# Patient Record
Sex: FEMALE | Race: WHITE | NOT HISPANIC OR LATINO | Employment: OTHER | URBAN - METROPOLITAN AREA
[De-identification: names, ages, dates, MRNs, and addresses within clinical notes are randomized per-mention and may not be internally consistent; named-entity substitution may affect disease eponyms.]

---

## 2017-11-10 ENCOUNTER — TRANSCRIBE ORDERS (OUTPATIENT)
Dept: ADMINISTRATIVE | Facility: HOSPITAL | Age: 72
End: 2017-11-10

## 2017-11-10 DIAGNOSIS — Z12.31 VISIT FOR SCREENING MAMMOGRAM: Primary | ICD-10-CM

## 2017-12-26 ENCOUNTER — HOSPITAL ENCOUNTER (OUTPATIENT)
Dept: MAMMOGRAPHY | Facility: HOSPITAL | Age: 72
Discharge: HOME | End: 2017-12-26
Admitting: FAMILY MEDICINE

## 2017-12-26 DIAGNOSIS — Z12.31 VISIT FOR SCREENING MAMMOGRAM: ICD-10-CM

## 2017-12-26 PROCEDURE — G0202 SCR MAMMO BI INCL CAD: HCPCS

## 2017-12-26 PROCEDURE — 77063 BREAST TOMOSYNTHESIS BI: CPT | Performed by: RADIOLOGY

## 2017-12-26 PROCEDURE — G0202 SCR MAMMO BI INCL CAD: HCPCS | Performed by: RADIOLOGY

## 2017-12-26 PROCEDURE — 77063 BREAST TOMOSYNTHESIS BI: CPT

## 2018-02-02 ENCOUNTER — TRANSCRIBE ORDERS (OUTPATIENT)
Dept: ADMINISTRATIVE | Facility: HOSPITAL | Age: 73
End: 2018-02-02

## 2018-02-02 DIAGNOSIS — Z78.0 POSTMENOPAUSAL: Primary | ICD-10-CM

## 2018-02-13 ENCOUNTER — HOSPITAL ENCOUNTER (OUTPATIENT)
Dept: BONE DENSITY | Facility: HOSPITAL | Age: 73
Discharge: HOME | End: 2018-02-13
Admitting: FAMILY MEDICINE

## 2018-02-13 DIAGNOSIS — Z78.0 POSTMENOPAUSAL: ICD-10-CM

## 2018-02-13 PROCEDURE — 77080 DXA BONE DENSITY AXIAL: CPT

## 2018-06-11 ENCOUNTER — OFFICE VISIT (OUTPATIENT)
Dept: ORTHOPEDIC SURGERY | Facility: CLINIC | Age: 73
End: 2018-06-11

## 2018-06-11 VITALS — WEIGHT: 162.04 LBS | BODY MASS INDEX: 30.59 KG/M2 | OXYGEN SATURATION: 98 % | HEART RATE: 82 BPM | HEIGHT: 61 IN

## 2018-06-11 DIAGNOSIS — M17.11 PRIMARY OSTEOARTHRITIS OF RIGHT KNEE: Primary | ICD-10-CM

## 2018-06-11 DIAGNOSIS — M17.12 PRIMARY OSTEOARTHRITIS OF LEFT KNEE: ICD-10-CM

## 2018-06-11 PROCEDURE — 20610 DRAIN/INJ JOINT/BURSA W/O US: CPT | Performed by: ORTHOPAEDIC SURGERY

## 2018-06-11 PROCEDURE — 99203 OFFICE O/P NEW LOW 30 MIN: CPT | Performed by: ORTHOPAEDIC SURGERY

## 2018-06-11 RX ORDER — TRIAMCINOLONE ACETONIDE 40 MG/ML
40 INJECTION, SUSPENSION INTRA-ARTICULAR; INTRAMUSCULAR
Status: COMPLETED | OUTPATIENT
Start: 2018-06-11 | End: 2018-06-11

## 2018-06-11 RX ORDER — QUINAPRIL 40 MG/1
40 TABLET ORAL DAILY
COMMUNITY
Start: 2018-05-01

## 2018-06-11 RX ORDER — OMEPRAZOLE 20 MG/1
20 CAPSULE, DELAYED RELEASE ORAL DAILY PRN
COMMUNITY
Start: 2018-06-08

## 2018-06-11 RX ORDER — ROPIVACAINE HYDROCHLORIDE 5 MG/ML
4 INJECTION, SOLUTION EPIDURAL; INFILTRATION; PERINEURAL
Status: COMPLETED | OUTPATIENT
Start: 2018-06-11 | End: 2018-06-11

## 2018-06-11 RX ORDER — HYDROCHLOROTHIAZIDE 25 MG/1
25 TABLET ORAL DAILY
COMMUNITY
Start: 2018-06-08

## 2018-06-11 RX ORDER — ASPIRIN 81 MG/1
81 TABLET, CHEWABLE ORAL DAILY
Status: ON HOLD | COMMUNITY
End: 2019-09-11 | Stop reason: SDUPTHER

## 2018-06-11 RX ADMIN — ROPIVACAINE HYDROCHLORIDE 4 ML: 5 INJECTION, SOLUTION EPIDURAL; INFILTRATION; PERINEURAL at 10:03

## 2018-06-11 RX ADMIN — TRIAMCINOLONE ACETONIDE 40 MG: 40 INJECTION, SUSPENSION INTRA-ARTICULAR; INTRAMUSCULAR at 10:03

## 2018-06-11 RX ADMIN — ROPIVACAINE HYDROCHLORIDE 4 ML: 5 INJECTION, SOLUTION EPIDURAL; INFILTRATION; PERINEURAL at 10:04

## 2018-06-11 RX ADMIN — TRIAMCINOLONE ACETONIDE 40 MG: 40 INJECTION, SUSPENSION INTRA-ARTICULAR; INTRAMUSCULAR at 10:04

## 2018-06-11 NOTE — PROGRESS NOTES
[facility] [place] [facility] Note    Subjective     Chief Complaint   Patient presents with   • Left Knee - Pain   • Right Knee - Pain        HPI    Lilia Murcia is a 72 y.o. female. She presents today for evaluation of bilateral knee pain, left greater than right.  The pain is been present for 2-3 years, following no injury.  The pain is worsening over time, moderate to severe, aching and stabbing in quality, associated with swelling, and worsens with walking, standing, sitting and climbing stairs.  She has tried Pennsaid cream, as well as a steroid injection a few years ago.      There is no problem list on file for this patient.    History reviewed. No pertinent past medical history.   Past Surgical History:   Procedure Laterality Date   • BREAST BIOPSY     • CHOLECYSTECTOMY     • HYSTERECTOMY     • OOPHORECTOMY        Family History   Problem Relation Age of Onset   • Breast cancer Neg Hx    • Ovarian cancer Neg Hx      Social History     Social History   • Marital status:      Spouse name: N/A   • Number of children: N/A   • Years of education: N/A     Occupational History   • Not on file.     Social History Main Topics   • Smoking status: Never Smoker   • Smokeless tobacco: Never Used   • Alcohol use No   • Drug use: No   • Sexual activity: Defer     Other Topics Concern   • Not on file     Social History Narrative   • No narrative on file      No current outpatient prescriptions on file prior to visit.     No current facility-administered medications on file prior to visit.       Allergies   Allergen Reactions   • Sulfa Antibiotics Unknown (See Comments)     May or may not be a reaction. Has been years since a reaction.         Review of Systems   Constitutional: Negative for activity change, appetite change, chills, diaphoresis, fatigue, fever and unexpected weight change.   HENT: Negative for congestion, dental problem, drooling, ear discharge, ear pain, facial swelling, hearing loss,  "mouth sores, nosebleeds, postnasal drip, rhinorrhea, sinus pressure, sneezing, sore throat, tinnitus, trouble swallowing and voice change.    Eyes: Negative for photophobia, pain, discharge, redness, itching and visual disturbance.   Respiratory: Negative for apnea, cough, choking, chest tightness, shortness of breath, wheezing and stridor.    Cardiovascular: Negative for chest pain, palpitations and leg swelling.   Gastrointestinal: Negative for abdominal distention, abdominal pain, anal bleeding, blood in stool, constipation, diarrhea, nausea, rectal pain and vomiting.   Endocrine: Negative for cold intolerance, heat intolerance, polydipsia, polyphagia and polyuria.   Genitourinary: Negative for decreased urine volume, difficulty urinating, dysuria, enuresis, flank pain, frequency, genital sores, hematuria and urgency.   Musculoskeletal: Positive for arthralgias. Negative for back pain, gait problem, joint swelling, myalgias, neck pain and neck stiffness.   Skin: Negative for color change, pallor, rash and wound.   Allergic/Immunologic: Negative for environmental allergies, food allergies and immunocompromised state.   Neurological: Negative for dizziness, tremors, seizures, syncope, facial asymmetry, speech difficulty, weakness, light-headedness, numbness and headaches.   Hematological: Negative for adenopathy. Does not bruise/bleed easily.   Psychiatric/Behavioral: Negative for agitation, behavioral problems, confusion, decreased concentration, dysphoric mood, hallucinations, self-injury, sleep disturbance and suicidal ideas. The patient is not nervous/anxious and is not hyperactive.         Objective      Physical Exam  Pulse 82   Ht 155 cm (61.02\")   Wt 73.5 kg (162 lb 0.6 oz)   SpO2 98%   BMI 30.59 kg/m²     Body mass index is 30.59 kg/m².    General:   Mental Status:  Alert   Appearance: Cooperative, in no acute distress   Build and Nutrition: Well-nourished and well developed female   Orientation: Alert " and oriented to person, place and time   Posture: Normal   Gait: Normal    Integument:   Right knee: no skin lesions, no rash, no ecchymosis   Left knee: no skin lesions, no rash, no ecchymosis    Neurologic:   Sensation:    Right foot: intact to light touch on the dorsal and plantar aspect    Left foot: intact to light touch on the dorsal and plantar aspect   Motor:  Right lower extremity: 5/5 quadriceps, hamstrings, ankle dorsiflexors, and ankle plantar flexors  Left lower extremity: 5/5 quadriceps, hamstrings, ankle dorsiflexors, and ankle plantar flexors  Vascular:   Right lower extremity: 2+ dorsalis pedis pulse, prompt capillary refill   Left lower extremity: 2+ dorsalis pedis pulse, prompt capillary refill    Lower Extremities:   Right Knee:    Tenderness:  Medial and lateral joint line tenderness    Effusion:  None    Swelling:  None    Crepitus:  Positive    Atrophy:  None    Range of motion:  Extension: 0°       Flexion: 120°  Instability:  No varus laxity, no valgus laxity, negative anterior drawer  Deformities:  Varus   Left Knee:    Tenderness:  Medial and lateral joint line tenderness    Effusion:  None    Swelling:  None    Crepitus: Positive    Atrophy:  None    Range of motion:  Extension: 0°       Flexion: 120°  Instability:  No varus laxity, no valgus laxity, negative anterior drawer  Deformities:  Varus      Imaging/Studies  Imaging Results (last 24 hours)     Procedure Component Value Units Date/Time    XR Knee 4+ View Bilateral [[id number]] Resulted:  06/11/18 0944     Updated:  06/11/18 0945    Narrative:       Right Knee Radiographs  Indication: right knee pain  Views: Standing AP's and skiers of both knees, with lateral and sunrise   views of the right knee    Comparison: no prior studies available    Findings:   Advanced arthritic changes are seen, with tricompartmental osteophytes,   varus alignment, and bone-on-bone contact in the medial compartment.    Left Knee Radiographs  Indication:  left knee pain  Views: Standing AP's and skiers of both knees, with lateral and sunrise   views of the left knee    Comparison: no prior studies available    Findings:   Advanced arthritic changes are seen, with tricompartmental osteophytes,   varus alignment, and bone-on-bone contact in the medial compartment.            Assessment and Plan     Lilia was seen today for pain and pain.    Diagnoses and all orders for this visit:    Primary osteoarthritis of right knee  -     XR Knee 4+ View Bilateral  -     Large Joint Arthrocentesis    Primary osteoarthritis of left knee  -     XR Knee 4+ View Bilateral  -     Large Joint Arthrocentesis        I reviewed my findings with patient today.  She has bilateral knee arthritis, and ultimately may be a candidate for knee replacement surgery.  It's been few years since her last steroid injection, which did help.  We will try steroid injections today, and I will see her back in 2 months.  I will see her back sooner for any problems.    Of note, she had about 50% relief just a few minutes following the injections.    Return in about 2 months (around 8/11/2018).      Medical Decision Making  Management Options : prescription/IM medicine  Data/Risk: radiology tests and independent visualization of imaging, lab tests, or EMG/NCV      Jeffrey Sutton MD  06/11/18  10:24 AM

## 2018-06-11 NOTE — PROGRESS NOTES
Procedure   Large Joint Arthrocentesis  Date/Time: 6/11/2018 10:03 AM  Consent given by: patient  Site marked: site marked  Timeout: Immediately prior to procedure a time out was called to verify the correct patient, procedure, equipment, support staff and site/side marked as required   Supporting Documentation  Indications: pain   Procedure Details  Location: knee - R knee  Preparation: Patient was prepped and draped in the usual sterile fashion  Needle size: 22 G  Approach: anterolateral  Medications administered: 40 mg triamcinolone acetonide 40 MG/ML; 4 mL ropivacaine 0.5 %  Patient tolerance: patient tolerated the procedure well with no immediate complications    Large Joint Arthrocentesis  Date/Time: 6/11/2018 10:04 AM  Consent given by: patient  Site marked: site marked  Timeout: Immediately prior to procedure a time out was called to verify the correct patient, procedure, equipment, support staff and site/side marked as required   Supporting Documentation  Indications: pain   Procedure Details  Location: knee - L knee  Preparation: Patient was prepped and draped in the usual sterile fashion  Needle size: 22 G  Approach: anterolateral  Medications administered: 40 mg triamcinolone acetonide 40 MG/ML; 4 mL ropivacaine 0.5 %  Patient tolerance: patient tolerated the procedure well with no immediate complications

## 2018-08-08 ENCOUNTER — OFFICE VISIT (OUTPATIENT)
Dept: ORTHOPEDIC SURGERY | Facility: CLINIC | Age: 73
End: 2018-08-08

## 2018-08-08 VITALS — BODY MASS INDEX: 29.97 KG/M2 | OXYGEN SATURATION: 98 % | WEIGHT: 158.73 LBS | HEART RATE: 102 BPM | HEIGHT: 61 IN

## 2018-08-08 DIAGNOSIS — M17.12 PRIMARY OSTEOARTHRITIS OF LEFT KNEE: ICD-10-CM

## 2018-08-08 DIAGNOSIS — M17.11 PRIMARY OSTEOARTHRITIS OF RIGHT KNEE: Primary | ICD-10-CM

## 2018-08-08 PROCEDURE — 20610 DRAIN/INJ JOINT/BURSA W/O US: CPT | Performed by: ORTHOPAEDIC SURGERY

## 2018-08-08 RX ORDER — TRIAMCINOLONE ACETONIDE 40 MG/ML
40 INJECTION, SUSPENSION INTRA-ARTICULAR; INTRAMUSCULAR
Status: COMPLETED | OUTPATIENT
Start: 2018-08-08 | End: 2018-08-08

## 2018-08-08 RX ORDER — ROPIVACAINE HYDROCHLORIDE 5 MG/ML
4 INJECTION, SOLUTION EPIDURAL; INFILTRATION; PERINEURAL
Status: COMPLETED | OUTPATIENT
Start: 2018-08-08 | End: 2018-08-08

## 2018-08-08 RX ADMIN — ROPIVACAINE HYDROCHLORIDE 4 ML: 5 INJECTION, SOLUTION EPIDURAL; INFILTRATION; PERINEURAL at 10:06

## 2018-08-08 RX ADMIN — TRIAMCINOLONE ACETONIDE 40 MG: 40 INJECTION, SUSPENSION INTRA-ARTICULAR; INTRAMUSCULAR at 10:11

## 2018-08-08 RX ADMIN — ROPIVACAINE HYDROCHLORIDE 4 ML: 5 INJECTION, SOLUTION EPIDURAL; INFILTRATION; PERINEURAL at 10:11

## 2018-08-08 RX ADMIN — TRIAMCINOLONE ACETONIDE 40 MG: 40 INJECTION, SUSPENSION INTRA-ARTICULAR; INTRAMUSCULAR at 10:06

## 2018-08-08 NOTE — PROGRESS NOTES
[facility] [place] [facility] Note    Subjective     Chief Complaint   Patient presents with   • Left Knee - Follow-up     Primary Osteoarthritis of Both Knees; Bilateral Knee Cortisone Injections given 6/11/18; 2 month f/u   • Right Knee - Follow-up     Primary Osteoarthritis of Both Knees; Bilateral Knee Cortisone Injections given 6/11/18; 2 month f/u        BHAVESH    Lilia Murcia is a 73 y.o. female.  She follows up today for both her knees.  She did have about a month and a half of relief with the injections, but the pain is coming back.  It's not as bad as it used to be, but is moderate in severity today.  She does have aching pain, and worsens with standing, sitting and climbing stairs.  She would like to have injections today.      There is no problem list on file for this patient.    History reviewed. No pertinent past medical history.   Past Surgical History:   Procedure Laterality Date   • BREAST BIOPSY     • CHOLECYSTECTOMY     • HYSTERECTOMY     • OOPHORECTOMY        Family History   Problem Relation Age of Onset   • Breast cancer Neg Hx    • Ovarian cancer Neg Hx      Social History     Social History   • Marital status:      Spouse name: N/A   • Number of children: N/A   • Years of education: N/A     Occupational History   • Not on file.     Social History Main Topics   • Smoking status: Never Smoker   • Smokeless tobacco: Never Used   • Alcohol use No   • Drug use: No   • Sexual activity: Defer     Other Topics Concern   • Not on file     Social History Narrative   • No narrative on file      Current Outpatient Prescriptions on File Prior to Visit   Medication Sig Dispense Refill   • aspirin 81 MG chewable tablet Chew 81 mg Daily.     • hydrochlorothiazide (HYDRODIURIL) 25 MG tablet      • omeprazole (priLOSEC) 20 MG capsule      • quinapril (ACCUPRIL) 40 MG tablet        No current facility-administered medications on file prior to visit.       Allergies   Allergen Reactions   • Sulfa  Antibiotics Unknown (See Comments)     May or may not be a reaction. Has been years since a reaction.         Review of Systems   Constitutional: Negative for activity change, appetite change, chills, diaphoresis, fatigue, fever and unexpected weight change.   HENT: Negative for congestion, dental problem, drooling, ear discharge, ear pain, facial swelling, hearing loss, mouth sores, nosebleeds, postnasal drip, rhinorrhea, sinus pressure, sneezing, sore throat, tinnitus, trouble swallowing and voice change.    Eyes: Negative for photophobia, pain, discharge, redness, itching and visual disturbance.   Respiratory: Positive for wheezing. Negative for apnea, cough, choking, chest tightness, shortness of breath and stridor.    Cardiovascular: Negative for chest pain, palpitations and leg swelling.   Gastrointestinal: Negative for abdominal distention, abdominal pain, anal bleeding, blood in stool, constipation, diarrhea, nausea, rectal pain and vomiting.   Endocrine: Negative for cold intolerance, heat intolerance, polydipsia, polyphagia and polyuria.   Genitourinary: Negative for decreased urine volume, difficulty urinating, dysuria, enuresis, flank pain, frequency, genital sores, hematuria and urgency.   Musculoskeletal: Positive for joint swelling. Negative for arthralgias, back pain, gait problem, myalgias, neck pain and neck stiffness.        Joint Pain    Skin: Negative for color change, pallor, rash and wound.   Allergic/Immunologic: Negative for environmental allergies, food allergies and immunocompromised state.   Neurological: Negative for dizziness, tremors, seizures, syncope, facial asymmetry, speech difficulty, weakness, light-headedness, numbness and headaches.   Hematological: Negative for adenopathy. Does not bruise/bleed easily.   Psychiatric/Behavioral: Negative for agitation, behavioral problems, confusion, decreased concentration, dysphoric mood, hallucinations, self-injury, sleep disturbance and  "suicidal ideas. The patient is not nervous/anxious and is not hyperactive.         Objective      Physical Exam  Pulse 102   Ht 155 cm (61.02\")   Wt 72 kg (158 lb 11.7 oz)   SpO2 98%   BMI 29.97 kg/m²     Body mass index is 29.97 kg/m².    General:   Mental Status:  Alert   Appearance: Cooperative, in no acute distress   Build and Nutrition: Well-nourished and well developed female   Orientation: Alert and oriented to person, place and time   Posture: Normal   Gait: Normal    Integument:   Right knee: no skin lesions, no rash, no ecchymosis   Left knee: no skin lesions, no rash, no ecchymosis    Lower Extremities:   Right Knee:    Tenderness:  None    Effusion:  None    Swelling:  None    Crepitus:  Positive    Atrophy:  None    Range of motion:  Extension: 0°       Flexion: 120°  Instability:  No varus laxity, no valgus laxity, negative anterior drawer  Deformities:  None   Left Knee:    Tenderness:  None    Effusion:  None    Swelling:  None    Crepitus: Positive    Atrophy:  None    Range of motion:  Extension: 0°       Flexion: 120°  Instability:  No varus laxity, no valgus laxity, negative anterior drawer  Deformities:  None          Assessment and Plan     Lilia was seen today for follow-up and follow-up.    Diagnoses and all orders for this visit:    Primary osteoarthritis of right knee  -     Large Joint Arthrocentesis    Primary osteoarthritis of left knee  -     Large Joint Arthrocentesis        I reviewed my findings with patient today.  She does have advanced arthritis in both knees, and ultimately may be a candidate for knee replacement surgery.  Symptoms are moderate today.  She would like to have additional injections today.  She knows the limitations of the injections.  I will see her back in 4 months, but sooner for any problems.    Of note, she had 75% relief just a few minutes following the injections.    Return in about 4 months (around 12/8/2018).      Medical Decision Making  Management " Options : prescription/IM medicine      Jeffrey Sutton MD  08/08/18  10:34 AM

## 2018-08-08 NOTE — PROGRESS NOTES
Procedure   Large Joint Arthrocentesis  Date/Time: 8/8/2018 10:06 AM  Consent given by: patient  Site marked: site marked  Timeout: Immediately prior to procedure a time out was called to verify the correct patient, procedure, equipment, support staff and site/side marked as required   Supporting Documentation  Indications: pain   Procedure Details  Location: knee - R knee  Preparation: Patient was prepped and draped in the usual sterile fashion  Needle size: 22 G  Approach: anterolateral  Medications administered: 40 mg triamcinolone acetonide 40 MG/ML; 4 mL ropivacaine 0.5 %  Patient tolerance: patient tolerated the procedure well with no immediate complications    Large Joint Arthrocentesis  Date/Time: 8/8/2018 10:11 AM  Consent given by: patient  Site marked: site marked  Timeout: Immediately prior to procedure a time out was called to verify the correct patient, procedure, equipment, support staff and site/side marked as required   Supporting Documentation  Indications: pain   Procedure Details  Location: knee - L knee  Preparation: Patient was prepped and draped in the usual sterile fashion  Needle size: 22 G  Approach: anterolateral  Medications administered: 40 mg triamcinolone acetonide 40 MG/ML; 4 mL ropivacaine 0.5 %  Patient tolerance: patient tolerated the procedure well with no immediate complications

## 2018-12-10 ENCOUNTER — TRANSCRIBE ORDERS (OUTPATIENT)
Dept: ADMINISTRATIVE | Facility: HOSPITAL | Age: 73
End: 2018-12-10

## 2018-12-10 ENCOUNTER — OFFICE VISIT (OUTPATIENT)
Dept: ORTHOPEDIC SURGERY | Facility: CLINIC | Age: 73
End: 2018-12-10

## 2018-12-10 VITALS — OXYGEN SATURATION: 99 % | BODY MASS INDEX: 31.09 KG/M2 | HEIGHT: 61 IN | WEIGHT: 164.68 LBS | HEART RATE: 99 BPM

## 2018-12-10 DIAGNOSIS — Z12.31 VISIT FOR SCREENING MAMMOGRAM: Primary | ICD-10-CM

## 2018-12-10 DIAGNOSIS — M17.11 PRIMARY OSTEOARTHRITIS OF RIGHT KNEE: Primary | ICD-10-CM

## 2018-12-10 DIAGNOSIS — M17.12 PRIMARY OSTEOARTHRITIS OF LEFT KNEE: ICD-10-CM

## 2018-12-10 PROCEDURE — 99212 OFFICE O/P EST SF 10 MIN: CPT | Performed by: ORTHOPAEDIC SURGERY

## 2018-12-10 NOTE — PROGRESS NOTES
[facility] [place] [facility] Note    Subjective     Chief Complaint   Patient presents with   • Left Knee - Follow-up     Primary Osteoarthritis of Both Knees; Bilateral Knee Cortisone Injections given 08/08/18; 4 month f/u   • Right Knee - Follow-up     Primary Osteoarthritis of Both Knees; Bilateral Knee Cortisone Injections given 08/08/18; 4 month f/u        BHAVESH    Lilia Murcia is a 73 y.o. female.  She follows up today for both her knees.  Both knees continue to bother her, with 4 out of 10 pain.  She's had pain for several years now.  The pain is associated with grinding and swelling.  It worsens with walking, standing, climbing stairs, as well as with work and leisure activities.  She would like to forego an injection today, and wait until February.      There is no problem list on file for this patient.    History reviewed. No pertinent past medical history.   Past Surgical History:   Procedure Laterality Date   • BREAST BIOPSY     • CHOLECYSTECTOMY     • HYSTERECTOMY     • OOPHORECTOMY        Family History   Problem Relation Age of Onset   • Breast cancer Neg Hx    • Ovarian cancer Neg Hx      Social History     Socioeconomic History   • Marital status:      Spouse name: Not on file   • Number of children: Not on file   • Years of education: Not on file   • Highest education level: Not on file   Social Needs   • Financial resource strain: Not on file   • Food insecurity - worry: Not on file   • Food insecurity - inability: Not on file   • Transportation needs - medical: Not on file   • Transportation needs - non-medical: Not on file   Occupational History   • Not on file   Tobacco Use   • Smoking status: Never Smoker   • Smokeless tobacco: Never Used   Substance and Sexual Activity   • Alcohol use: No   • Drug use: No   • Sexual activity: Defer   Other Topics Concern   • Not on file   Social History Narrative   • Not on file      Current Outpatient Medications on File Prior to Visit  "  Medication Sig Dispense Refill   • aspirin 81 MG chewable tablet Chew 81 mg Daily.     • hydrochlorothiazide (HYDRODIURIL) 25 MG tablet      • omeprazole (priLOSEC) 20 MG capsule      • quinapril (ACCUPRIL) 40 MG tablet        No current facility-administered medications on file prior to visit.       Allergies   Allergen Reactions   • Sulfa Antibiotics Unknown (See Comments)     May or may not be a reaction. Has been years since a reaction.         Review of Systems   Constitutional: Positive for activity change.   HENT: Negative.    Eyes: Negative.    Respiratory: Negative.    Cardiovascular: Negative.    Gastrointestinal: Negative.    Endocrine: Negative.    Genitourinary: Negative.    Musculoskeletal: Positive for arthralgias (bilateral knee) and joint swelling.   Skin: Negative.    Allergic/Immunologic: Negative.    Neurological: Negative.    Hematological: Negative.    Psychiatric/Behavioral: Negative.         Objective      Physical Exam  Pulse 99   Ht 155 cm (61.02\")   Wt 74.7 kg (164 lb 10.9 oz)   SpO2 99%   BMI 31.09 kg/m²     Body mass index is 31.09 kg/m².    General:   Mental Status:  Alert   Appearance: Cooperative, in no acute distress   Build and Nutrition: Well-nourished and well developed female   Orientation: Alert and oriented to person, place and time   Posture: Normal   Gait: Normal    Integument:   Right knee: no skin lesions, no rash, no ecchymosis   Left knee: no skin lesions, no rash, no ecchymosis    Lower Extremities:   Right Knee:    Tenderness:  Lateral joint line tenderness    Effusion:  None    Swelling:  None    Crepitus:  Positive    Atrophy:  None    Range of motion:  Extension: 0°       Flexion: 120°  Instability:  No varus laxity, no valgus laxity, negative anterior drawer  Deformities:  Varus   Left Knee:    Tenderness:  Medial and lateral joint line tenderness    Effusion:  None    Swelling:  None    Crepitus: Positive    Atrophy:  None    Range of motion: "  Extension: 0°       Flexion: 120°  Instability:  No varus laxity, no valgus laxity, negative anterior drawer  Deformities:  Varus        Assessment and Plan     Lilia was seen today for follow-up and follow-up.    Diagnoses and all orders for this visit:    Primary osteoarthritis of right knee    Primary osteoarthritis of left knee        I reviewed my findings with patient today.  Both knees continue to bother her, but she wishes to wait for injections until February.  We'll see her back then, and consider injections at that time.  Long-term, she is a candidate for knee replacement surgery.  She is not yet interested.    Return in about 2 months (around 2/10/2019).          Jeffrey Sutton MD  12/10/18  10:19 AM

## 2019-01-29 ENCOUNTER — HOSPITAL ENCOUNTER (OUTPATIENT)
Dept: MAMMOGRAPHY | Facility: HOSPITAL | Age: 74
Discharge: HOME | End: 2019-01-29
Admitting: FAMILY MEDICINE

## 2019-01-29 DIAGNOSIS — Z12.31 VISIT FOR SCREENING MAMMOGRAM: ICD-10-CM

## 2019-01-29 PROCEDURE — 77067 SCR MAMMO BI INCL CAD: CPT

## 2019-01-29 PROCEDURE — 77063 BREAST TOMOSYNTHESIS BI: CPT

## 2019-01-29 PROCEDURE — 77063 BREAST TOMOSYNTHESIS BI: CPT | Performed by: RADIOLOGY

## 2019-01-29 PROCEDURE — 77067 SCR MAMMO BI INCL CAD: CPT | Performed by: RADIOLOGY

## 2019-02-06 ENCOUNTER — OFFICE VISIT (OUTPATIENT)
Dept: ORTHOPEDIC SURGERY | Facility: CLINIC | Age: 74
End: 2019-02-06

## 2019-02-06 VITALS — OXYGEN SATURATION: 97 % | HEART RATE: 105 BPM | HEIGHT: 61 IN | WEIGHT: 164.24 LBS | BODY MASS INDEX: 31.01 KG/M2

## 2019-02-06 DIAGNOSIS — M17.11 PRIMARY OSTEOARTHRITIS OF RIGHT KNEE: Primary | ICD-10-CM

## 2019-02-06 DIAGNOSIS — M17.12 PRIMARY OSTEOARTHRITIS OF LEFT KNEE: ICD-10-CM

## 2019-02-06 PROCEDURE — 20610 DRAIN/INJ JOINT/BURSA W/O US: CPT | Performed by: ORTHOPAEDIC SURGERY

## 2019-02-06 RX ORDER — TRIAMCINOLONE ACETONIDE 40 MG/ML
40 INJECTION, SUSPENSION INTRA-ARTICULAR; INTRAMUSCULAR
Status: COMPLETED | OUTPATIENT
Start: 2019-02-06 | End: 2019-02-06

## 2019-02-06 RX ORDER — ROPIVACAINE HYDROCHLORIDE 5 MG/ML
4 INJECTION, SOLUTION EPIDURAL; INFILTRATION; PERINEURAL
Status: COMPLETED | OUTPATIENT
Start: 2019-02-06 | End: 2019-02-06

## 2019-02-06 RX ORDER — ERYTHROMYCIN 5 MG/G
OINTMENT OPHTHALMIC
COMMUNITY
Start: 2018-11-29 | End: 2019-08-27

## 2019-02-06 RX ADMIN — ROPIVACAINE HYDROCHLORIDE 4 ML: 5 INJECTION, SOLUTION EPIDURAL; INFILTRATION; PERINEURAL at 10:16

## 2019-02-06 RX ADMIN — TRIAMCINOLONE ACETONIDE 40 MG: 40 INJECTION, SUSPENSION INTRA-ARTICULAR; INTRAMUSCULAR at 10:17

## 2019-02-06 RX ADMIN — TRIAMCINOLONE ACETONIDE 40 MG: 40 INJECTION, SUSPENSION INTRA-ARTICULAR; INTRAMUSCULAR at 10:16

## 2019-02-06 RX ADMIN — ROPIVACAINE HYDROCHLORIDE 4 ML: 5 INJECTION, SOLUTION EPIDURAL; INFILTRATION; PERINEURAL at 10:17

## 2019-02-06 NOTE — PROGRESS NOTES
[facility] Note    Subjective     Chief Complaint   Patient presents with   • Follow-up     2 months - primary osteoarthritis of both knees         HPI    Lilia Murcia is a 73 y.o. female.  She follows up today for both her knees.  She continues to have pain in both knees, 5 out of 10, which is been present for several years.  Previous injections have provided good brief relief.  She is not interested in surgical intervention this time.  She would like injections today.      There is no problem list on file for this patient.    History reviewed. No pertinent past medical history.   Past Surgical History:   Procedure Laterality Date   • BREAST EXCISIONAL BIOPSY Right 11/12/2013   • CHOLECYSTECTOMY     • HYSTERECTOMY     • OOPHORECTOMY        Family History   Problem Relation Age of Onset   • Breast cancer Neg Hx    • Ovarian cancer Neg Hx      Social History     Socioeconomic History   • Marital status:      Spouse name: Not on file   • Number of children: Not on file   • Years of education: Not on file   • Highest education level: Not on file   Social Needs   • Financial resource strain: Not on file   • Food insecurity - worry: Not on file   • Food insecurity - inability: Not on file   • Transportation needs - medical: Not on file   • Transportation needs - non-medical: Not on file   Occupational History   • Not on file   Tobacco Use   • Smoking status: Never Smoker   • Smokeless tobacco: Never Used   Substance and Sexual Activity   • Alcohol use: No   • Drug use: No   • Sexual activity: Defer   Other Topics Concern   • Not on file   Social History Narrative   • Not on file      Current Outpatient Medications on File Prior to Visit   Medication Sig Dispense Refill   • aspirin 81 MG chewable tablet Chew 81 mg Daily.     • erythromycin (ROMYCIN) 5 MG/GM ophthalmic ointment      • hydrochlorothiazide (HYDRODIURIL) 25 MG tablet      • omeprazole (priLOSEC) 20 MG capsule      •  quinapril (ACCUPRIL) 40 MG tablet        No current facility-administered medications on file prior to visit.       Allergies   Allergen Reactions   • Sulfa Antibiotics Unknown (See Comments)     May or may not be a reaction. Has been years since a reaction.         Review of Systems   Constitutional: Negative for activity change, appetite change, chills, diaphoresis, fatigue, fever and unexpected weight change.   HENT: Negative for congestion, dental problem, drooling, ear discharge, ear pain, facial swelling, hearing loss, mouth sores, nosebleeds, postnasal drip, rhinorrhea, sinus pressure, sneezing, sore throat, tinnitus, trouble swallowing and voice change.    Eyes: Negative for photophobia, pain, discharge, redness, itching and visual disturbance.   Respiratory: Negative for apnea, cough, choking, chest tightness, shortness of breath, wheezing and stridor.    Cardiovascular: Negative for chest pain, palpitations and leg swelling.   Gastrointestinal: Negative for abdominal distention, abdominal pain, anal bleeding, blood in stool, constipation, diarrhea, nausea, rectal pain and vomiting.   Endocrine: Negative for cold intolerance, heat intolerance, polydipsia, polyphagia and polyuria.   Genitourinary: Negative for decreased urine volume, difficulty urinating, dysuria, enuresis, flank pain, frequency, genital sores, hematuria and urgency.   Musculoskeletal: Negative for arthralgias, back pain, gait problem, joint swelling, myalgias, neck pain and neck stiffness.        Primary osteoarthritis of both knees      Skin: Negative for color change, pallor, rash and wound.   Allergic/Immunologic: Negative for environmental allergies, food allergies and immunocompromised state.   Neurological: Negative for dizziness, tremors, seizures, syncope, facial asymmetry, speech difficulty, weakness, light-headedness, numbness and headaches.   Hematological: Negative for adenopathy. Does not bruise/bleed easily.  "  Psychiatric/Behavioral: Negative for agitation, behavioral problems, confusion, decreased concentration, dysphoric mood, hallucinations, self-injury, sleep disturbance and suicidal ideas. The patient is not nervous/anxious and is not hyperactive.         Objective      Physical Exam  Pulse 105   Ht 155 cm (61.02\")   Wt 74.5 kg (164 lb 3.9 oz)   SpO2 97%   BMI 31.01 kg/m²     Body mass index is 31.01 kg/m².    General:   Mental Status:  Alert   Appearance: Cooperative, in no acute distress   Build and Nutrition: Well-nourished and well developed female   Orientation: Alert and oriented to person, place and time   Posture: Normal   Gait: Normal    Integument:   Right knee: no skin lesions, no rash, no ecchymosis   Left knee: no skin lesions, no rash, no ecchymosis    Lower Extremities:   Right Knee:    Tenderness:  Lateral joint line tenderness    Effusion:  None    Swelling:  None    Crepitus:  Positive    Atrophy:  None    Range of motion:  Extension: 0°       Flexion: 120°  Instability:  No varus laxity, no valgus laxity, negative anterior drawer  Deformities:  Varus   Left Knee:    Tenderness:  Medial and lateral joint line tenderness    Effusion:  None    Swelling:  None    Crepitus: Positive    Atrophy:  None    Range of motion:  Extension: 0°       Flexion: 120°  Instability:  No varus laxity, no valgus laxity, negative anterior drawer  Deformities:  Varus          Assessment and Plan     Lilia was seen today for follow-up.    Diagnoses and all orders for this visit:    Primary osteoarthritis of right knee  -     Large Joint Arthrocentesis: R knee    Primary osteoarthritis of left knee  -     Large Joint Arthrocentesis: L knee        I reviewed my findings with patient today.  She has advanced bilateral knee arthritis, and would like injections today.  She is not yet interested in surgical intervention.  I will see her back in 4 months, but sooner for any problems.    Of note, she had 30% improvement " just a few minutes from the injections.    Return in about 4 months (around 6/6/2019).      Medical Decision Making  Management Options : prescription/IM medicine      Jeffrey Sutton MD  02/06/19  12:32 PM

## 2019-02-06 NOTE — PROGRESS NOTES
Procedure   Large Joint Arthrocentesis: R knee  Date/Time: 2/6/2019 10:16 AM  Consent given by: patient  Site marked: site marked  Timeout: Immediately prior to procedure a time out was called to verify the correct patient, procedure, equipment, support staff and site/side marked as required   Supporting Documentation  Indications: pain   Procedure Details  Location: knee - R knee  Preparation: Patient was prepped and draped in the usual sterile fashion  Needle size: 22 G  Approach: anterolateral  Medications administered: 4 mL ropivacaine 0.5 %; 40 mg triamcinolone acetonide 40 MG/ML  Patient tolerance: patient tolerated the procedure well with no immediate complications    Large Joint Arthrocentesis: L knee  Date/Time: 2/6/2019 10:17 AM  Consent given by: patient  Site marked: site marked  Timeout: Immediately prior to procedure a time out was called to verify the correct patient, procedure, equipment, support staff and site/side marked as required   Supporting Documentation  Indications: pain   Procedure Details  Location: knee - L knee  Preparation: Patient was prepped and draped in the usual sterile fashion  Needle size: 22 G  Approach: anterolateral  Medications administered: 4 mL ropivacaine 0.5 %; 40 mg triamcinolone acetonide 40 MG/ML  Patient tolerance: patient tolerated the procedure well with no immediate complications

## 2019-06-05 ENCOUNTER — OFFICE VISIT (OUTPATIENT)
Dept: ORTHOPEDIC SURGERY | Facility: CLINIC | Age: 74
End: 2019-06-05

## 2019-06-05 VITALS — HEART RATE: 91 BPM | HEIGHT: 61 IN | OXYGEN SATURATION: 98 % | WEIGHT: 160.94 LBS | BODY MASS INDEX: 30.39 KG/M2

## 2019-06-05 DIAGNOSIS — M17.11 PRIMARY OSTEOARTHRITIS OF RIGHT KNEE: ICD-10-CM

## 2019-06-05 DIAGNOSIS — M17.12 PRIMARY OSTEOARTHRITIS OF LEFT KNEE: Primary | ICD-10-CM

## 2019-06-05 PROCEDURE — 20610 DRAIN/INJ JOINT/BURSA W/O US: CPT | Performed by: ORTHOPAEDIC SURGERY

## 2019-06-05 PROCEDURE — 99213 OFFICE O/P EST LOW 20 MIN: CPT | Performed by: ORTHOPAEDIC SURGERY

## 2019-06-05 RX ORDER — TRIAMCINOLONE ACETONIDE 40 MG/ML
40 INJECTION, SUSPENSION INTRA-ARTICULAR; INTRAMUSCULAR
Status: COMPLETED | OUTPATIENT
Start: 2019-06-05 | End: 2019-06-05

## 2019-06-05 RX ORDER — ROPIVACAINE HYDROCHLORIDE 5 MG/ML
4 INJECTION, SOLUTION EPIDURAL; INFILTRATION; PERINEURAL
Status: COMPLETED | OUTPATIENT
Start: 2019-06-05 | End: 2019-06-05

## 2019-06-05 RX ORDER — ACETAMINOPHEN 325 MG/1
1000 TABLET ORAL ONCE
Status: CANCELLED | OUTPATIENT
Start: 2019-06-05 | End: 2019-06-05

## 2019-06-05 RX ORDER — MELOXICAM 7.5 MG/1
15 TABLET ORAL ONCE
Status: CANCELLED | OUTPATIENT
Start: 2019-06-05 | End: 2019-06-05

## 2019-06-05 RX ORDER — PREGABALIN 150 MG/1
150 CAPSULE ORAL ONCE
Status: CANCELLED | OUTPATIENT
Start: 2019-06-05 | End: 2019-06-05

## 2019-06-05 RX ADMIN — ROPIVACAINE HYDROCHLORIDE 4 ML: 5 INJECTION, SOLUTION EPIDURAL; INFILTRATION; PERINEURAL at 10:31

## 2019-06-05 RX ADMIN — ROPIVACAINE HYDROCHLORIDE 4 ML: 5 INJECTION, SOLUTION EPIDURAL; INFILTRATION; PERINEURAL at 10:29

## 2019-06-05 RX ADMIN — TRIAMCINOLONE ACETONIDE 40 MG: 40 INJECTION, SUSPENSION INTRA-ARTICULAR; INTRAMUSCULAR at 10:31

## 2019-06-05 RX ADMIN — TRIAMCINOLONE ACETONIDE 40 MG: 40 INJECTION, SUSPENSION INTRA-ARTICULAR; INTRAMUSCULAR at 10:29

## 2019-06-05 NOTE — PROGRESS NOTES
[facility] Note    Subjective     Chief Complaint   Patient presents with   • Follow-up     4 month follow up - primary osteoarthritis of both knees         HPI    Lilia Murcia is a 73 y.o. female.  Follows up today for both her knees.  She continues to have pain in both knees, left greater than right.  She would like to consider left knee replacement surgery at this time.  She would like to wait at least 3 months, and therefore would like injections in the meantime.  However, the pain in the right knee is 5 out of 10, and the pain in the left knee is 10 out of 10.  The pain is aching, associate with swelling, grinding and stiffness.  Pain is worse with walking, standing and climbing stairs.  She does have pain with work and leisure activities, as well as some night pain.  No improvement with anti-inflammatories long-term, but brief relief with injections in the past.  No history of clots or clotting disorders.      There is no problem list on file for this patient.    History reviewed. No pertinent past medical history.   Past Surgical History:   Procedure Laterality Date   • BREAST EXCISIONAL BIOPSY Right 11/12/2013   • CHOLECYSTECTOMY     • HYSTERECTOMY     • OOPHORECTOMY        Family History   Problem Relation Age of Onset   • Breast cancer Neg Hx    • Ovarian cancer Neg Hx      Social History     Socioeconomic History   • Marital status:      Spouse name: Not on file   • Number of children: Not on file   • Years of education: Not on file   • Highest education level: Not on file   Tobacco Use   • Smoking status: Never Smoker   • Smokeless tobacco: Never Used   Substance and Sexual Activity   • Alcohol use: No   • Drug use: No   • Sexual activity: Defer      Current Outpatient Medications on File Prior to Visit   Medication Sig Dispense Refill   • aspirin 81 MG chewable tablet Chew 81 mg Daily.     • erythromycin (ROMYCIN) 5 MG/GM ophthalmic ointment      • hydrochlorothiazide  (HYDRODIURIL) 25 MG tablet      • omeprazole (priLOSEC) 20 MG capsule      • quinapril (ACCUPRIL) 40 MG tablet        No current facility-administered medications on file prior to visit.       Allergies   Allergen Reactions   • Sulfa Antibiotics Unknown (See Comments)     May or may not be a reaction. Has been years since a reaction.         Review of Systems   Constitutional: Negative for activity change, appetite change, chills, diaphoresis, fatigue, fever and unexpected weight change.   HENT: Negative for congestion, dental problem, drooling, ear discharge, ear pain, facial swelling, hearing loss, mouth sores, nosebleeds, postnasal drip, rhinorrhea, sinus pressure, sneezing, sore throat, tinnitus, trouble swallowing and voice change.    Eyes: Negative for photophobia, pain, discharge, redness, itching and visual disturbance.   Respiratory: Negative for apnea, cough, choking, chest tightness, shortness of breath, wheezing and stridor.    Cardiovascular: Negative for chest pain, palpitations and leg swelling.   Gastrointestinal: Negative for abdominal distention, abdominal pain, anal bleeding, blood in stool, constipation, diarrhea, nausea, rectal pain and vomiting.   Endocrine: Negative for cold intolerance, heat intolerance, polydipsia, polyphagia and polyuria.   Genitourinary: Negative for decreased urine volume, difficulty urinating, dysuria, enuresis, flank pain, frequency, genital sores, hematuria and urgency.   Musculoskeletal: Positive for joint swelling. Negative for arthralgias, back pain, gait problem, myalgias, neck pain and neck stiffness.   Skin: Negative for color change, pallor, rash and wound.   Allergic/Immunologic: Negative for environmental allergies, food allergies and immunocompromised state.   Neurological: Negative for dizziness, tremors, seizures, syncope, facial asymmetry, speech difficulty, weakness, light-headedness, numbness and headaches.   Hematological: Negative for adenopathy. Does  "not bruise/bleed easily.   Psychiatric/Behavioral: Negative for agitation, behavioral problems, confusion, decreased concentration, dysphoric mood, hallucinations, self-injury, sleep disturbance and suicidal ideas. The patient is not nervous/anxious and is not hyperactive.         Objective      Physical Exam  Pulse 91   Ht 155 cm (61.02\")   Wt 73 kg (160 lb 15 oz)   SpO2 98%   Breastfeeding? No   BMI 30.38 kg/m²     Body mass index is 30.38 kg/m².    General:   Mental Status:  Alert   Appearance: Cooperative, in no acute distress   Build and Nutrition: Well-nourished well-developed female   Orientation: Alert and oriented to person, place and time   Posture: Normal   Gait: Normal    Integument:   Right knee: no skin lesions, no rash, no ecchymosis   Left knee: no skin lesions, no rash, no ecchymosis    Lower Extremities:   Right Knee:    Tenderness:  Medial and lateral joint line tenderness    Effusion:  None    Swelling:  None    Crepitus:  Positive    Atrophy:  None    Range of motion:  Extension: 0°       Flexion: 120°  Instability:  No varus laxity, no valgus laxity, negative anterior drawer  Deformities:  Varus   Left Knee:    Tenderness:  Medial and lateral joint line tenderness    Effusion:  None    Swelling:  None    Crepitus: Positive    Atrophy:  None    Range of motion:  Extension: 5°       Flexion: 120°  Instability:  No varus laxity, no valgus laxity, negative anterior drawer  Deformities:  Varus      Imaging/Studies  Imaging Results (last 24 hours)     Procedure Component Value Units Date/Time    XR Knee 4+ View Left [[id number]] Resulted:  06/05/19 1059     Updated:  06/05/19 1100    Narrative:       Left Knee Radiographs  Indication: left knee pain  Views: Standing AP's and skiers of both knees, with lateral and sunrise   views of the left knee    Comparison: 3/11/2018    Findings:   Worsening arthritis, with bone-on-bone contact of the medial compartment,   tricompartment osteophytes, varus " alignment, advanced patellofemoral   degeneration, with no unusual bony features.  No acute bony abnormalities.    Impression: Advanced left knee osteoarthritis.            Assessment and Plan     Lilia was seen today for follow-up.    Diagnoses and all orders for this visit:    Primary osteoarthritis of left knee  -     Case Request; Standing  -     Instructions on coughing, deep breathing, and incentive spirometry.; Future  -     CBC and Differential; Future  -     Basic metabolic panel; Future  -     Protime-INR; Future  -     APTT; Future  -     Sedimentation rate; Future  -     C-reactive protein; Future  -     Urinalysis With Culture If Indicated - Urine, Clean Catch; Future  -     Tranexamic Acid 1,000 mg in sodium chloride 0.9 % 100 mL  -     Tranexamic Acid 1,000 mg in sodium chloride 0.9 % 100 mL  -     ceFAZolin (ANCEF) 2 g in sodium chloride 0.9 % 100 mL IVPB  -     acetaminophen (TYLENOL) tablet 975 mg  -     meloxicam (MOBIC) tablet 15 mg  -     pregabalin (LYRICA) capsule 150 mg  -     mupirocin (BACTROBAN) 2 % nasal ointment 1 application  -     Case Request  -     XR Knee 4+ View Left  -     Large Joint Arthrocentesis: L knee    Primary osteoarthritis of right knee  -     Large Joint Arthrocentesis: R knee    Other orders  -     Outpatient In A Bed; Standing  -     Follow Anesthesia Guidelines / Standing Orders; Future  -     Obtain informed consent  -     Provide instructions to patient regarding NPO status  -     Clorhexidine skin prep  -     Care Order / Instruction for all Female Patients  -     Follow Anesthesia Guidelines / Standing Orders; Standing  -     Nerve Block; Standing  -     Verify NPO Status; Standing  -     SCD (sequential compression device)- to be placed on patient in Pre-op; Standing  -     Clip operative site; Standing  -     Obtain informed consent (if not collected inpatient or PAT); Standing  -     Notify Physician - Standard; Standing  -     NPO After Midnight  -      mupirocin (BACTROBAN) 2 % ointment; Apply into the nostril(s) as directed by provider 2 (Two) Times a Day.  -     Chlorhexidine Gluconate 4 % solution; Apply topically to the appropriate area as directed Daily.        1. Primary osteoarthritis of left knee    2. Primary osteoarthritis of right knee        I reviewed my findings with patient today.  Both her knees continue to bother her, and she would like to have injections today.  She is interested in left total knee replacement surgery after 3 months.  We will schedule at a mutually convenient time.  She has exhausted conservative treatment options.  Please see my counseling note for details regarding the left total knee replacement.    Of note, she had 40% improvement just a few minutes following the injections.    Surgical Counseling     I have informed the patient of the diagnosis and the prognosis.  Exhaustive conservative treatment modalities have not resulted in long term pain relief.  The symptoms have progressed to the point of daily pain and inability to perform activities of daily living without significant pain. The patient has reached the point of desiring to proceed with total knee arthroplasty after discussing the risks, benefits and alternatives to the procedure.  The surgical procedure itself was discussed in detail. Risks of the procedure were discussed, which included but are not limited to, bleeding, infection, damage to blood vessels and nerves, incomplete pain relief, loosening of the prosthesis, deep infection, need for further surgery, loss of limb, deep venous thrombosis, pulmonary embolus, death, heart attack, stroke, kidney failure, liver failure, and anesthetic complications.  In addition, the potential for deep infection developing in the future was discussed, which could require further surgery.  The knee would have to be re-opened, debrided, and potentially remove the prosthesis, which may or may not be replaced in the future.  Also,  the possibility for loosening of the prosthesis has been mentioned.  If the prosthesis loosened, a revision arthroplasty could be performed, with results that are not as predictable compared to the original procedure.  The typical rehabilitative course has also been discussed, and full recovery may take up to a year to see the maximum benefit.  The importance of patient cooperation in the rehabilitative efforts has also been discussed.  No guarantees whatsoever were given.  The patient understands the potential risks versus the benefits and desires to proceed with total knee arthroplasty at a mutually convenient time.    Return for For surgery as planned.      Medical Decision Making  Management Options : prescription/IM medicine and major surgery with risk factors  Data/Risk: radiology tests and independent visualization of imaging, lab tests, or EMG/NCV      Jeffrey Sutton MD  06/05/19  11:01 AM

## 2019-06-05 NOTE — PROGRESS NOTES
Procedure   Large Joint Arthrocentesis: R knee  Date/Time: 6/5/2019 10:29 AM  Consent given by: patient  Site marked: site marked  Timeout: Immediately prior to procedure a time out was called to verify the correct patient, procedure, equipment, support staff and site/side marked as required   Supporting Documentation  Indications: pain   Procedure Details  Location: knee - R knee  Preparation: Patient was prepped and draped in the usual sterile fashion  Needle size: 22 G  Approach: anterolateral  Medications administered: 40 mg triamcinolone acetonide 40 MG/ML; 4 mL ropivacaine 0.5 %  Patient tolerance: patient tolerated the procedure well with no immediate complications    Large Joint Arthrocentesis: L knee  Date/Time: 6/5/2019 10:31 AM  Consent given by: patient  Site marked: site marked  Timeout: Immediately prior to procedure a time out was called to verify the correct patient, procedure, equipment, support staff and site/side marked as required   Supporting Documentation  Indications: pain   Procedure Details  Location: knee - L knee  Preparation: Patient was prepped and draped in the usual sterile fashion  Needle size: 22 G  Approach: anterolateral  Medications administered: 40 mg triamcinolone acetonide 40 MG/ML; 4 mL ropivacaine 0.5 %  Patient tolerance: patient tolerated the procedure well with no immediate complications

## 2019-06-06 PROBLEM — M17.12 PRIMARY OSTEOARTHRITIS OF LEFT KNEE: Status: ACTIVE | Noted: 2019-06-06

## 2019-08-27 ENCOUNTER — TELEPHONE (OUTPATIENT)
Dept: ORTHOPEDIC SURGERY | Facility: CLINIC | Age: 74
End: 2019-08-27

## 2019-08-27 ENCOUNTER — APPOINTMENT (OUTPATIENT)
Dept: PREADMISSION TESTING | Facility: HOSPITAL | Age: 74
End: 2019-08-27

## 2019-08-27 VITALS — BODY MASS INDEX: 31.26 KG/M2 | WEIGHT: 165.57 LBS | HEIGHT: 61 IN

## 2019-08-27 DIAGNOSIS — M17.12 PRIMARY OSTEOARTHRITIS OF LEFT KNEE: ICD-10-CM

## 2019-08-27 LAB
ANION GAP SERPL CALCULATED.3IONS-SCNC: 10 MMOL/L (ref 5–15)
APTT PPP: 26.7 SECONDS (ref 24–37)
BACTERIA UR QL AUTO: NORMAL /HPF
BASOPHILS # BLD AUTO: 0.05 10*3/MM3 (ref 0–0.2)
BASOPHILS NFR BLD AUTO: 0.8 % (ref 0–1.5)
BILIRUB UR QL STRIP: NEGATIVE
BUN BLD-MCNC: 17 MG/DL (ref 8–23)
BUN/CREAT SERPL: 27 (ref 7–25)
CALCIUM SPEC-SCNC: 9.4 MG/DL (ref 8.6–10.5)
CHLORIDE SERPL-SCNC: 103 MMOL/L (ref 98–107)
CLARITY UR: CLEAR
CO2 SERPL-SCNC: 30 MMOL/L (ref 22–29)
COLOR UR: YELLOW
CREAT BLD-MCNC: 0.63 MG/DL (ref 0.57–1)
CRP SERPL-MCNC: 0.32 MG/DL (ref 0–0.5)
DEPRECATED RDW RBC AUTO: 50.4 FL (ref 37–54)
EOSINOPHIL # BLD AUTO: 0.26 10*3/MM3 (ref 0–0.4)
EOSINOPHIL NFR BLD AUTO: 4.1 % (ref 0.3–6.2)
ERYTHROCYTE [DISTWIDTH] IN BLOOD BY AUTOMATED COUNT: 15 % (ref 12.3–15.4)
ERYTHROCYTE [SEDIMENTATION RATE] IN BLOOD: 33 MM/HR (ref 0–30)
GFR SERPL CREATININE-BSD FRML MDRD: 93 ML/MIN/1.73
GLUCOSE BLD-MCNC: 103 MG/DL (ref 65–99)
GLUCOSE UR STRIP-MCNC: NEGATIVE MG/DL
HBA1C MFR BLD: 5.9 % (ref 4.8–5.6)
HCT VFR BLD AUTO: 38.2 % (ref 34–46.6)
HGB BLD-MCNC: 11.7 G/DL (ref 12–15.9)
HGB UR QL STRIP.AUTO: NEGATIVE
HYALINE CASTS UR QL AUTO: NORMAL /LPF
IMM GRANULOCYTES # BLD AUTO: 0.03 10*3/MM3 (ref 0–0.05)
IMM GRANULOCYTES NFR BLD AUTO: 0.5 % (ref 0–0.5)
INR PPP: 1.03 (ref 0.85–1.16)
KETONES UR QL STRIP: NEGATIVE
LEUKOCYTE ESTERASE UR QL STRIP.AUTO: ABNORMAL
LYMPHOCYTES # BLD AUTO: 1.81 10*3/MM3 (ref 0.7–3.1)
LYMPHOCYTES NFR BLD AUTO: 28.6 % (ref 19.6–45.3)
MCH RBC QN AUTO: 28.1 PG (ref 26.6–33)
MCHC RBC AUTO-ENTMCNC: 30.6 G/DL (ref 31.5–35.7)
MCV RBC AUTO: 91.6 FL (ref 79–97)
MONOCYTES # BLD AUTO: 0.61 10*3/MM3 (ref 0.1–0.9)
MONOCYTES NFR BLD AUTO: 9.7 % (ref 5–12)
NEUTROPHILS # BLD AUTO: 3.56 10*3/MM3 (ref 1.7–7)
NEUTROPHILS NFR BLD AUTO: 56.3 % (ref 42.7–76)
NITRITE UR QL STRIP: NEGATIVE
NRBC BLD AUTO-RTO: 0 /100 WBC (ref 0–0.2)
PH UR STRIP.AUTO: 8 [PH] (ref 5–8)
PLATELET # BLD AUTO: 256 10*3/MM3 (ref 140–450)
PMV BLD AUTO: 9.9 FL (ref 6–12)
POTASSIUM BLD-SCNC: 4.3 MMOL/L (ref 3.5–5.2)
PROT UR QL STRIP: NEGATIVE
PROTHROMBIN TIME: 13 SECONDS (ref 11.2–14.3)
RBC # BLD AUTO: 4.17 10*6/MM3 (ref 3.77–5.28)
RBC # UR: NORMAL /HPF
REF LAB TEST METHOD: NORMAL
SODIUM BLD-SCNC: 143 MMOL/L (ref 136–145)
SP GR UR STRIP: 1.02 (ref 1–1.03)
SQUAMOUS #/AREA URNS HPF: NORMAL /HPF
UROBILINOGEN UR QL STRIP: ABNORMAL
WBC NRBC COR # BLD: 6.32 10*3/MM3 (ref 3.4–10.8)
WBC UR QL AUTO: NORMAL /HPF

## 2019-08-27 PROCEDURE — 87086 URINE CULTURE/COLONY COUNT: CPT | Performed by: ORTHOPAEDIC SURGERY

## 2019-08-27 PROCEDURE — 85610 PROTHROMBIN TIME: CPT | Performed by: ORTHOPAEDIC SURGERY

## 2019-08-27 PROCEDURE — 80048 BASIC METABOLIC PNL TOTAL CA: CPT | Performed by: ORTHOPAEDIC SURGERY

## 2019-08-27 PROCEDURE — 85025 COMPLETE CBC W/AUTO DIFF WBC: CPT | Performed by: ORTHOPAEDIC SURGERY

## 2019-08-27 PROCEDURE — 81001 URINALYSIS AUTO W/SCOPE: CPT | Performed by: ORTHOPAEDIC SURGERY

## 2019-08-27 PROCEDURE — 36415 COLL VENOUS BLD VENIPUNCTURE: CPT

## 2019-08-27 PROCEDURE — 85652 RBC SED RATE AUTOMATED: CPT | Performed by: ORTHOPAEDIC SURGERY

## 2019-08-27 PROCEDURE — 86140 C-REACTIVE PROTEIN: CPT | Performed by: ORTHOPAEDIC SURGERY

## 2019-08-27 PROCEDURE — 85730 THROMBOPLASTIN TIME PARTIAL: CPT | Performed by: ORTHOPAEDIC SURGERY

## 2019-08-27 PROCEDURE — 83036 HEMOGLOBIN GLYCOSYLATED A1C: CPT | Performed by: ANESTHESIOLOGY

## 2019-08-27 ASSESSMENT — KOOS JR
KOOS JR SCORE: 42.281
KOOS JR SCORE: 18

## 2019-08-27 NOTE — PAT
Patient attended joint replacement class today during PAT visit.      Patient instructed to drink 20 ounces (or until full) of Gatorade and it needs to be completed 1 hour before given arrival time for procedure (NO RED Gatorade)    Patient verbalized understanding.      Per Anesthesia Request, patient instructed not to take their ACE/ARB medications on the AM of surgery.      Verified with patient that they received a prescription for Bactroban and Chlorhexidine shower from the office.  Reinforced with them to fill the prescription if they haven't already.  Verbal and written instructions given regarding proper use of the Bactroban and Chlorhexidine to patient and/or famlily during PAT visit. Patient/family also instructed to complete checklist and return it to Pre-op on the day of surgery.  Patient and/or family verbalized understanding.        Per mainor at office urine culture added to urine since only trace leukos and pt didn't want to be cathed. Order added to epic and results to be called to mainor when culture result if needed.

## 2019-08-27 NOTE — TELEPHONE ENCOUNTER
----- Message from Jeffrey Sutton MD sent at 8/27/2019  4:10 PM EDT -----  Should be ok    ----- Message -----  From: Padma Tafoya  Sent: 8/27/2019   4:05 PM  To: Jeffrey Sutton MD    SED RATE 33

## 2019-08-28 LAB — BACTERIA SPEC AEROBE CULT: NO GROWTH

## 2019-09-04 NOTE — PROGRESS NOTES
Pre Op Ortho Assessment     [place]     Patient Name: Lilia Murcia  MRN: [id number]  Today's Date: 9/4/2019        PRE-OPERATIVE ORTHOPEDIC ASSESSMENT     Row Name 09/04/19 1155       Question    What is your age group?  1    Gender?  1    How far on average can you walk? (a block is 200 meters)  2    Which gait aid do you use? (more often than not)  2    Do you use community supports: (home help, meals on wheels, district nursing)  1    Will you live with someone who can care for you after your operation?  0    Your Score (out of 12)  7       Discharge Disposition/Planning:    Discussed the predicted discharge disposition needed based on RAPT Assessment with the patient  No    Patient Selected Discharge Disposition:  Home Health    Outpatient Rehabilitation Facility of Choice:  -- n/a    Home Health Services Preferred:  -- most likely [facility]    Post-operative Caregiver Name and Phone Number  Child       Subacute Inpatient Rehabilitation: Complete this section only if planning inpatient services at a sub-acute facility.     Subacute Facility Preferred  -- [facility] if needed,but she will likely be overnight outpatient,she plans to go home    Requires pre-certification for inpatient rehabilitation services?  No       Home Equipment    Does patient have a walker for home use?  No needs a rolling walker ordered    Does patient have a 3 in 1 commode for home use?  No    Does patient have a shower chair for home use?  Yes    Does patient have an elevated commode seat for home use?  Yes installed high toilets    Does patient have a cane for home use?  Yes    Is there any other medical equipment in the home?  No       Pre-Operative Class Attendance    Attended or scheduled to attend the pre-operative class within 1 year of total joint replacement?  Yes attended on 8/27       Patient Education    Expected time of discharge discussed  Yes    Encouraged to attend pre-operative class  Yes    Education regarding  predicted discharge disposition based on RAPT score  No    Patient receptive and voiced understanding of information given  Yes      I spoke with Ms Murcia by phone to discuss d/c plan for upcoming surgery. Her plan is to go home, she prefers home. She lives alone, has a son next door ( he has a hx of CVA,but can assist some),also has another son who will visit frequently and a friend. She will need a rolling walker. We discussed Medicare guidelines that classify most TKRs as overnight outpatient, she verbalized understanding. She had considered going to [facility],but advised that she would require an inpt 3 day stay. We briefly discussed Respite program at [facility],but no details given,since she thinks she can manage at home. Discussed Home Health options,she has no preference and is agreeable to [facility]. No other questions verbalized.      Sonja C Kellerman, RN

## 2019-09-10 ENCOUNTER — HOSPITAL ENCOUNTER (OUTPATIENT)
Facility: HOSPITAL | Age: 74
Discharge: HOME | End: 2019-09-11
Attending: ORTHOPAEDIC SURGERY | Admitting: ORTHOPAEDIC SURGERY

## 2019-09-10 ENCOUNTER — APPOINTMENT (OUTPATIENT)
Dept: GENERAL RADIOLOGY | Facility: HOSPITAL | Age: 74
End: 2019-09-10

## 2019-09-10 ENCOUNTER — ANESTHESIA (OUTPATIENT)
Dept: PERIOP | Facility: HOSPITAL | Age: 74
End: 2019-09-10

## 2019-09-10 ENCOUNTER — ANESTHESIA EVENT (OUTPATIENT)
Dept: PERIOP | Facility: HOSPITAL | Age: 74
End: 2019-09-10

## 2019-09-10 DIAGNOSIS — Z78.9 IMPAIRED MOBILITY AND ADLS: ICD-10-CM

## 2019-09-10 DIAGNOSIS — Z96.652 STATUS POST TOTAL LEFT KNEE REPLACEMENT: ICD-10-CM

## 2019-09-10 DIAGNOSIS — M17.12 PRIMARY OSTEOARTHRITIS OF LEFT KNEE: ICD-10-CM

## 2019-09-10 DIAGNOSIS — Z74.09 IMPAIRED FUNCTIONAL MOBILITY, BALANCE, GAIT, AND ENDURANCE: Primary | ICD-10-CM

## 2019-09-10 DIAGNOSIS — Z74.09 IMPAIRED MOBILITY AND ADLS: ICD-10-CM

## 2019-09-10 PROBLEM — I10 HTN (HYPERTENSION): Status: ACTIVE | Noted: 2019-09-10

## 2019-09-10 PROBLEM — R73.03 PREDIABETES: Status: ACTIVE | Noted: 2019-09-10

## 2019-09-10 PROBLEM — M17.11 PRIMARY OSTEOARTHRITIS OF RIGHT KNEE: Status: ACTIVE | Noted: 2019-09-10

## 2019-09-10 LAB
GLUCOSE BLDC GLUCOMTR-MCNC: 137 MG/DL (ref 70–130)
GLUCOSE BLDC GLUCOMTR-MCNC: 237 MG/DL (ref 70–130)
POTASSIUM BLD-SCNC: 4.2 MMOL/L (ref 3.5–5.2)

## 2019-09-10 PROCEDURE — 63710000001 MELOXICAM 15 MG TABLET: Performed by: ORTHOPAEDIC SURGERY

## 2019-09-10 PROCEDURE — 84132 ASSAY OF SERUM POTASSIUM: CPT | Performed by: ANESTHESIOLOGY

## 2019-09-10 PROCEDURE — 63710000001 HYDROCODONE-ACETAMINOPHEN 5-325 MG TABLET: Performed by: ORTHOPAEDIC SURGERY

## 2019-09-10 PROCEDURE — 25010000002 ROPIVACAINE PER 1 MG: Performed by: NURSE ANESTHETIST, CERTIFIED REGISTERED

## 2019-09-10 PROCEDURE — A9270 NON-COVERED ITEM OR SERVICE: HCPCS | Performed by: ANESTHESIOLOGY

## 2019-09-10 PROCEDURE — 63710000001 PREGABALIN 150 MG CAPSULE: Performed by: ORTHOPAEDIC SURGERY

## 2019-09-10 PROCEDURE — A9270 NON-COVERED ITEM OR SERVICE: HCPCS | Performed by: ORTHOPAEDIC SURGERY

## 2019-09-10 PROCEDURE — 63710000001 INSULIN LISPRO (HUMAN) PER 5 UNITS: Performed by: NURSE PRACTITIONER

## 2019-09-10 PROCEDURE — 25010000002 DEXAMETHASONE PER 1 MG: Performed by: NURSE ANESTHETIST, CERTIFIED REGISTERED

## 2019-09-10 PROCEDURE — 97116 GAIT TRAINING THERAPY: CPT

## 2019-09-10 PROCEDURE — 25010000002 ROPIVACAINE PER 1 MG: Performed by: ORTHOPAEDIC SURGERY

## 2019-09-10 PROCEDURE — 63710000001 FAMOTIDINE 20 MG TABLET: Performed by: ANESTHESIOLOGY

## 2019-09-10 PROCEDURE — 73560 X-RAY EXAM OF KNEE 1 OR 2: CPT

## 2019-09-10 PROCEDURE — 27447 TOTAL KNEE ARTHROPLASTY: CPT | Performed by: ORTHOPAEDIC SURGERY

## 2019-09-10 PROCEDURE — A9270 NON-COVERED ITEM OR SERVICE: HCPCS | Performed by: NURSE PRACTITIONER

## 2019-09-10 PROCEDURE — 25010000003 CEFAZOLIN IN DEXTROSE 2-4 GM/100ML-% SOLUTION: Performed by: ORTHOPAEDIC SURGERY

## 2019-09-10 PROCEDURE — C1776 JOINT DEVICE (IMPLANTABLE): HCPCS | Performed by: ORTHOPAEDIC SURGERY

## 2019-09-10 PROCEDURE — 63710000001 ACETAMINOPHEN 500 MG TABLET: Performed by: ORTHOPAEDIC SURGERY

## 2019-09-10 PROCEDURE — C1713 ANCHOR/SCREW BN/BN,TIS/BN: HCPCS | Performed by: ORTHOPAEDIC SURGERY

## 2019-09-10 PROCEDURE — 25010000002 FENTANYL CITRATE (PF) 100 MCG/2ML SOLUTION: Performed by: NURSE ANESTHETIST, CERTIFIED REGISTERED

## 2019-09-10 PROCEDURE — 63710000001 LISINOPRIL 40 MG TABLET: Performed by: NURSE PRACTITIONER

## 2019-09-10 PROCEDURE — 97161 PT EVAL LOW COMPLEX 20 MIN: CPT

## 2019-09-10 PROCEDURE — 63710000001 MUPIROCIN 2 % OINTMENT: Performed by: ORTHOPAEDIC SURGERY

## 2019-09-10 PROCEDURE — 25010000002 ONDANSETRON PER 1 MG: Performed by: NURSE ANESTHETIST, CERTIFIED REGISTERED

## 2019-09-10 PROCEDURE — 25010000002 PROPOFOL 10 MG/ML EMULSION: Performed by: NURSE ANESTHETIST, CERTIFIED REGISTERED

## 2019-09-10 PROCEDURE — 82962 GLUCOSE BLOOD TEST: CPT

## 2019-09-10 DEVICE — CMT BONE SIMPLEX/P FULL DOSE 10/PK: Type: IMPLANTABLE DEVICE | Status: FUNCTIONAL

## 2019-09-10 DEVICE — GENESIS II NON-POROUS TIBIAL                                    BASEPLATE SIZE 2 LEFT
Type: IMPLANTABLE DEVICE | Site: KNEE | Status: FUNCTIONAL
Brand: GENESIS II

## 2019-09-10 DEVICE — IMPLANTABLE DEVICE: Type: IMPLANTABLE DEVICE | Site: KNEE | Status: FUNCTIONAL

## 2019-09-10 DEVICE — LEGION PS HIGH FLEX XLPE SZ 1-2 10MM
Type: IMPLANTABLE DEVICE | Site: PATELLA | Status: FUNCTIONAL
Brand: LEGION

## 2019-09-10 DEVICE — LEGION NARROW POSTERIOR STABILIZED                                    OXINIUM SIZE 4N LEFT
Type: IMPLANTABLE DEVICE | Site: KNEE | Status: FUNCTIONAL
Brand: LEGION

## 2019-09-10 DEVICE — GEN II 7.5MM RESUR PAT 32MM
Type: IMPLANTABLE DEVICE | Site: PATELLA | Status: FUNCTIONAL
Brand: GENESIS II

## 2019-09-10 RX ORDER — SODIUM CHLORIDE 0.9 % (FLUSH) 0.9 %
3-10 SYRINGE (ML) INJECTION AS NEEDED
Status: DISCONTINUED | OUTPATIENT
Start: 2019-09-10 | End: 2019-09-10 | Stop reason: HOSPADM

## 2019-09-10 RX ORDER — FAMOTIDINE 20 MG/1
20 TABLET, FILM COATED ORAL
Status: COMPLETED | OUTPATIENT
Start: 2019-09-10 | End: 2019-09-10

## 2019-09-10 RX ORDER — MEPERIDINE HYDROCHLORIDE 25 MG/ML
12.5 INJECTION INTRAMUSCULAR; INTRAVENOUS; SUBCUTANEOUS
Status: DISCONTINUED | OUTPATIENT
Start: 2019-09-10 | End: 2019-09-10 | Stop reason: HOSPADM

## 2019-09-10 RX ORDER — NICOTINE POLACRILEX 4 MG
15 LOZENGE BUCCAL
Status: DISCONTINUED | OUTPATIENT
Start: 2019-09-10 | End: 2019-09-11 | Stop reason: HOSPADM

## 2019-09-10 RX ORDER — CEFAZOLIN SODIUM 2 G/100ML
2 INJECTION, SOLUTION INTRAVENOUS EVERY 8 HOURS
Status: COMPLETED | OUTPATIENT
Start: 2019-09-10 | End: 2019-09-11

## 2019-09-10 RX ORDER — MAGNESIUM HYDROXIDE 1200 MG/15ML
LIQUID ORAL AS NEEDED
Status: DISCONTINUED | OUTPATIENT
Start: 2019-09-10 | End: 2019-09-10 | Stop reason: HOSPADM

## 2019-09-10 RX ORDER — ACETAMINOPHEN 325 MG/1
650 TABLET ORAL EVERY 4 HOURS PRN
Status: DISCONTINUED | OUTPATIENT
Start: 2019-09-10 | End: 2019-09-11 | Stop reason: HOSPADM

## 2019-09-10 RX ORDER — NALOXONE HCL 0.4 MG/ML
0.1 VIAL (ML) INJECTION
Status: DISCONTINUED | OUTPATIENT
Start: 2019-09-10 | End: 2019-09-11 | Stop reason: HOSPADM

## 2019-09-10 RX ORDER — HYDROMORPHONE HYDROCHLORIDE 1 MG/ML
0.5 INJECTION, SOLUTION INTRAMUSCULAR; INTRAVENOUS; SUBCUTANEOUS
Status: DISCONTINUED | OUTPATIENT
Start: 2019-09-10 | End: 2019-09-10 | Stop reason: HOSPADM

## 2019-09-10 RX ORDER — LIDOCAINE HYDROCHLORIDE 10 MG/ML
INJECTION, SOLUTION EPIDURAL; INFILTRATION; INTRACAUDAL; PERINEURAL AS NEEDED
Status: DISCONTINUED | OUTPATIENT
Start: 2019-09-10 | End: 2019-09-10 | Stop reason: SURG

## 2019-09-10 RX ORDER — BISACODYL 10 MG
10 SUPPOSITORY, RECTAL RECTAL DAILY PRN
Status: DISCONTINUED | OUTPATIENT
Start: 2019-09-10 | End: 2019-09-11 | Stop reason: HOSPADM

## 2019-09-10 RX ORDER — BISACODYL 5 MG/1
10 TABLET, DELAYED RELEASE ORAL DAILY PRN
Status: DISCONTINUED | OUTPATIENT
Start: 2019-09-10 | End: 2019-09-11 | Stop reason: HOSPADM

## 2019-09-10 RX ORDER — SODIUM CHLORIDE 0.9 % (FLUSH) 0.9 %
3 SYRINGE (ML) INJECTION EVERY 12 HOURS SCHEDULED
Status: DISCONTINUED | OUTPATIENT
Start: 2019-09-10 | End: 2019-09-11 | Stop reason: HOSPADM

## 2019-09-10 RX ORDER — SODIUM CHLORIDE, SODIUM LACTATE, POTASSIUM CHLORIDE, CALCIUM CHLORIDE 600; 310; 30; 20 MG/100ML; MG/100ML; MG/100ML; MG/100ML
100 INJECTION, SOLUTION INTRAVENOUS CONTINUOUS
Status: DISCONTINUED | OUTPATIENT
Start: 2019-09-10 | End: 2019-09-11 | Stop reason: HOSPADM

## 2019-09-10 RX ORDER — MELOXICAM 7.5 MG/1
15 TABLET ORAL DAILY
Status: DISCONTINUED | OUTPATIENT
Start: 2019-09-11 | End: 2019-09-11 | Stop reason: HOSPADM

## 2019-09-10 RX ORDER — ACETAMINOPHEN 500 MG
1000 TABLET ORAL ONCE
Status: COMPLETED | OUTPATIENT
Start: 2019-09-10 | End: 2019-09-10

## 2019-09-10 RX ORDER — DOCUSATE SODIUM 100 MG/1
100 CAPSULE, LIQUID FILLED ORAL 2 TIMES DAILY PRN
Status: DISCONTINUED | OUTPATIENT
Start: 2019-09-10 | End: 2019-09-11 | Stop reason: HOSPADM

## 2019-09-10 RX ORDER — ONDANSETRON 4 MG/1
4 TABLET, FILM COATED ORAL EVERY 6 HOURS PRN
Status: DISCONTINUED | OUTPATIENT
Start: 2019-09-10 | End: 2019-09-11 | Stop reason: HOSPADM

## 2019-09-10 RX ORDER — ONDANSETRON 2 MG/ML
4 INJECTION INTRAMUSCULAR; INTRAVENOUS EVERY 6 HOURS PRN
Status: DISCONTINUED | OUTPATIENT
Start: 2019-09-10 | End: 2019-09-11 | Stop reason: HOSPADM

## 2019-09-10 RX ORDER — DEXTROSE MONOHYDRATE 25 G/50ML
25 INJECTION, SOLUTION INTRAVENOUS
Status: DISCONTINUED | OUTPATIENT
Start: 2019-09-10 | End: 2019-09-11 | Stop reason: HOSPADM

## 2019-09-10 RX ORDER — BUPIVACAINE HYDROCHLORIDE 5 MG/ML
INJECTION, SOLUTION PERINEURAL
Status: COMPLETED | OUTPATIENT
Start: 2019-09-10 | End: 2019-09-10

## 2019-09-10 RX ORDER — PANTOPRAZOLE SODIUM 40 MG/1
40 TABLET, DELAYED RELEASE ORAL EVERY MORNING
Status: DISCONTINUED | OUTPATIENT
Start: 2019-09-11 | End: 2019-09-11 | Stop reason: HOSPADM

## 2019-09-10 RX ORDER — CEFAZOLIN SODIUM 2 G/100ML
2 INJECTION, SOLUTION INTRAVENOUS ONCE
Status: COMPLETED | OUTPATIENT
Start: 2019-09-10 | End: 2019-09-10

## 2019-09-10 RX ORDER — ASPIRIN 325 MG
325 TABLET, DELAYED RELEASE (ENTERIC COATED) ORAL DAILY
Status: DISCONTINUED | OUTPATIENT
Start: 2019-09-11 | End: 2019-09-11 | Stop reason: HOSPADM

## 2019-09-10 RX ORDER — LABETALOL HYDROCHLORIDE 5 MG/ML
5 INJECTION, SOLUTION INTRAVENOUS
Status: DISCONTINUED | OUTPATIENT
Start: 2019-09-10 | End: 2019-09-10 | Stop reason: HOSPADM

## 2019-09-10 RX ORDER — PROPOFOL 10 MG/ML
VIAL (ML) INTRAVENOUS AS NEEDED
Status: DISCONTINUED | OUTPATIENT
Start: 2019-09-10 | End: 2019-09-10 | Stop reason: SURG

## 2019-09-10 RX ORDER — HYDROMORPHONE HYDROCHLORIDE 1 MG/ML
0.5 INJECTION, SOLUTION INTRAMUSCULAR; INTRAVENOUS; SUBCUTANEOUS
Status: DISCONTINUED | OUTPATIENT
Start: 2019-09-10 | End: 2019-09-11 | Stop reason: HOSPADM

## 2019-09-10 RX ORDER — DEXAMETHASONE SODIUM PHOSPHATE 4 MG/ML
INJECTION, SOLUTION INTRA-ARTICULAR; INTRALESIONAL; INTRAMUSCULAR; INTRAVENOUS; SOFT TISSUE AS NEEDED
Status: DISCONTINUED | OUTPATIENT
Start: 2019-09-10 | End: 2019-09-10 | Stop reason: SURG

## 2019-09-10 RX ORDER — ALBUTEROL SULFATE 90 UG/1
2 AEROSOL, METERED RESPIRATORY (INHALATION) EVERY 6 HOURS PRN
COMMUNITY

## 2019-09-10 RX ORDER — ROPIVACAINE HYDROCHLORIDE 5 MG/ML
INJECTION, SOLUTION EPIDURAL; INFILTRATION; PERINEURAL AS NEEDED
Status: DISCONTINUED | OUTPATIENT
Start: 2019-09-10 | End: 2019-09-10 | Stop reason: HOSPADM

## 2019-09-10 RX ORDER — LABETALOL HYDROCHLORIDE 5 MG/ML
10 INJECTION, SOLUTION INTRAVENOUS EVERY 4 HOURS PRN
Status: DISCONTINUED | OUTPATIENT
Start: 2019-09-10 | End: 2019-09-11 | Stop reason: HOSPADM

## 2019-09-10 RX ORDER — SODIUM CHLORIDE 0.9 % (FLUSH) 0.9 %
3 SYRINGE (ML) INJECTION EVERY 12 HOURS SCHEDULED
Status: DISCONTINUED | OUTPATIENT
Start: 2019-09-10 | End: 2019-09-10 | Stop reason: HOSPADM

## 2019-09-10 RX ORDER — MELOXICAM 15 MG/1
15 TABLET ORAL ONCE
Status: COMPLETED | OUTPATIENT
Start: 2019-09-10 | End: 2019-09-10

## 2019-09-10 RX ORDER — ACETAMINOPHEN 650 MG/1
650 SUPPOSITORY RECTAL EVERY 4 HOURS PRN
Status: DISCONTINUED | OUTPATIENT
Start: 2019-09-10 | End: 2019-09-11 | Stop reason: HOSPADM

## 2019-09-10 RX ORDER — FENTANYL CITRATE 50 UG/ML
INJECTION, SOLUTION INTRAMUSCULAR; INTRAVENOUS AS NEEDED
Status: DISCONTINUED | OUTPATIENT
Start: 2019-09-10 | End: 2019-09-10 | Stop reason: SURG

## 2019-09-10 RX ORDER — SODIUM CHLORIDE, SODIUM LACTATE, POTASSIUM CHLORIDE, CALCIUM CHLORIDE 600; 310; 30; 20 MG/100ML; MG/100ML; MG/100ML; MG/100ML
9 INJECTION, SOLUTION INTRAVENOUS CONTINUOUS PRN
Status: DISCONTINUED | OUTPATIENT
Start: 2019-09-10 | End: 2019-09-10 | Stop reason: HOSPADM

## 2019-09-10 RX ORDER — MORPHINE SULFATE 4 MG/ML
4 INJECTION, SOLUTION INTRAMUSCULAR; INTRAVENOUS
Status: DISCONTINUED | OUTPATIENT
Start: 2019-09-10 | End: 2019-09-11 | Stop reason: HOSPADM

## 2019-09-10 RX ORDER — ALBUTEROL SULFATE 2.5 MG/3ML
2.5 SOLUTION RESPIRATORY (INHALATION) EVERY 6 HOURS PRN
Status: DISCONTINUED | OUTPATIENT
Start: 2019-09-10 | End: 2019-09-11 | Stop reason: HOSPADM

## 2019-09-10 RX ORDER — NALOXONE HCL 0.4 MG/ML
0.4 VIAL (ML) INJECTION AS NEEDED
Status: DISCONTINUED | OUTPATIENT
Start: 2019-09-10 | End: 2019-09-10 | Stop reason: HOSPADM

## 2019-09-10 RX ORDER — ACETAMINOPHEN 160 MG/5ML
650 SOLUTION ORAL EVERY 4 HOURS PRN
Status: DISCONTINUED | OUTPATIENT
Start: 2019-09-10 | End: 2019-09-11 | Stop reason: HOSPADM

## 2019-09-10 RX ORDER — ONDANSETRON 2 MG/ML
4 INJECTION INTRAMUSCULAR; INTRAVENOUS ONCE AS NEEDED
Status: DISCONTINUED | OUTPATIENT
Start: 2019-09-10 | End: 2019-09-10 | Stop reason: HOSPADM

## 2019-09-10 RX ORDER — HYDROCODONE BITARTRATE AND ACETAMINOPHEN 5; 325 MG/1; MG/1
1 TABLET ORAL EVERY 4 HOURS PRN
Status: DISCONTINUED | OUTPATIENT
Start: 2019-09-10 | End: 2019-09-11 | Stop reason: HOSPADM

## 2019-09-10 RX ORDER — EPHEDRINE SULFATE 50 MG/ML
5 INJECTION, SOLUTION INTRAVENOUS ONCE AS NEEDED
Status: DISCONTINUED | OUTPATIENT
Start: 2019-09-10 | End: 2019-09-10 | Stop reason: HOSPADM

## 2019-09-10 RX ORDER — FENTANYL CITRATE 50 UG/ML
50 INJECTION, SOLUTION INTRAMUSCULAR; INTRAVENOUS
Status: DISCONTINUED | OUTPATIENT
Start: 2019-09-10 | End: 2019-09-10 | Stop reason: HOSPADM

## 2019-09-10 RX ORDER — LISINOPRIL 40 MG/1
40 TABLET ORAL
Status: DISCONTINUED | OUTPATIENT
Start: 2019-09-10 | End: 2019-09-11 | Stop reason: HOSPADM

## 2019-09-10 RX ORDER — LIDOCAINE HYDROCHLORIDE 10 MG/ML
0.5 INJECTION, SOLUTION EPIDURAL; INFILTRATION; INTRACAUDAL; PERINEURAL ONCE AS NEEDED
Status: COMPLETED | OUTPATIENT
Start: 2019-09-10 | End: 2019-09-10

## 2019-09-10 RX ORDER — ONDANSETRON 2 MG/ML
INJECTION INTRAMUSCULAR; INTRAVENOUS AS NEEDED
Status: DISCONTINUED | OUTPATIENT
Start: 2019-09-10 | End: 2019-09-10 | Stop reason: SURG

## 2019-09-10 RX ORDER — BUPIVACAINE HYDROCHLORIDE 2.5 MG/ML
INJECTION, SOLUTION EPIDURAL; INFILTRATION; INTRACAUDAL
Status: COMPLETED | OUTPATIENT
Start: 2019-09-10 | End: 2019-09-10

## 2019-09-10 RX ORDER — SODIUM CHLORIDE 9 MG/ML
120 INJECTION, SOLUTION INTRAVENOUS CONTINUOUS
Status: DISCONTINUED | OUTPATIENT
Start: 2019-09-10 | End: 2019-09-11 | Stop reason: HOSPADM

## 2019-09-10 RX ORDER — PREGABALIN 150 MG/1
150 CAPSULE ORAL ONCE
Status: COMPLETED | OUTPATIENT
Start: 2019-09-10 | End: 2019-09-10

## 2019-09-10 RX ORDER — ONDANSETRON 2 MG/ML
4 INJECTION INTRAMUSCULAR; INTRAVENOUS EVERY 6 HOURS PRN
Status: DISCONTINUED | OUTPATIENT
Start: 2019-09-10 | End: 2019-09-10 | Stop reason: SDUPTHER

## 2019-09-10 RX ORDER — SODIUM CHLORIDE 0.9 % (FLUSH) 0.9 %
1-10 SYRINGE (ML) INJECTION AS NEEDED
Status: DISCONTINUED | OUTPATIENT
Start: 2019-09-10 | End: 2019-09-11 | Stop reason: HOSPADM

## 2019-09-10 RX ORDER — NALOXONE HCL 0.4 MG/ML
0.4 VIAL (ML) INJECTION
Status: DISCONTINUED | OUTPATIENT
Start: 2019-09-10 | End: 2019-09-11 | Stop reason: HOSPADM

## 2019-09-10 RX ADMIN — DEXAMETHASONE SODIUM PHOSPHATE 8 MG: 4 INJECTION, SOLUTION INTRAMUSCULAR; INTRAVENOUS at 12:31

## 2019-09-10 RX ADMIN — HYDROCODONE BITARTRATE AND ACETAMINOPHEN 1 TABLET: 5; 325 TABLET ORAL at 15:30

## 2019-09-10 RX ADMIN — ROPIVACAINE HYDROCHLORIDE 10 ML/HR: 5 INJECTION, SOLUTION EPIDURAL; INFILTRATION; PERINEURAL at 13:16

## 2019-09-10 RX ADMIN — SODIUM CHLORIDE, POTASSIUM CHLORIDE, SODIUM LACTATE AND CALCIUM CHLORIDE 9 ML/HR: 600; 310; 30; 20 INJECTION, SOLUTION INTRAVENOUS at 08:52

## 2019-09-10 RX ADMIN — FAMOTIDINE 20 MG: 20 TABLET ORAL at 08:52

## 2019-09-10 RX ADMIN — CEFAZOLIN SODIUM 2 G: 2 INJECTION, SOLUTION INTRAVENOUS at 17:29

## 2019-09-10 RX ADMIN — ACETAMINOPHEN 1000 MG: 500 TABLET ORAL at 08:52

## 2019-09-10 RX ADMIN — LIDOCAINE HYDROCHLORIDE 3 ML: 10 INJECTION, SOLUTION EPIDURAL; INFILTRATION; INTRACAUDAL; PERINEURAL at 10:51

## 2019-09-10 RX ADMIN — PROPOFOL 30 MG: 10 INJECTION, EMULSION INTRAVENOUS at 10:51

## 2019-09-10 RX ADMIN — LIDOCAINE HYDROCHLORIDE 0.5 ML: 10 INJECTION, SOLUTION EPIDURAL; INFILTRATION; INTRACAUDAL; PERINEURAL at 08:53

## 2019-09-10 RX ADMIN — BUPIVACAINE HYDROCHLORIDE 20 ML: 2.5 INJECTION, SOLUTION EPIDURAL; INFILTRATION; INTRACAUDAL; PERINEURAL at 13:00

## 2019-09-10 RX ADMIN — BUPIVACAINE HYDROCHLORIDE 2 ML: 5 INJECTION, SOLUTION PERINEURAL at 11:55

## 2019-09-10 RX ADMIN — PROPOFOL 75 MCG/KG/MIN: 10 INJECTION, EMULSION INTRAVENOUS at 10:59

## 2019-09-10 RX ADMIN — MUPIROCIN 1 APPLICATION: 20 OINTMENT TOPICAL at 08:52

## 2019-09-10 RX ADMIN — CEFAZOLIN SODIUM 2 G: 2 INJECTION, SOLUTION INTRAVENOUS at 10:48

## 2019-09-10 RX ADMIN — TRANEXAMIC ACID 1000 MG: 100 INJECTION, SOLUTION INTRAVENOUS at 11:50

## 2019-09-10 RX ADMIN — MELOXICAM 15 MG: 15 TABLET ORAL at 08:52

## 2019-09-10 RX ADMIN — PREGABALIN 150 MG: 150 CAPSULE ORAL at 08:52

## 2019-09-10 RX ADMIN — LISINOPRIL 40 MG: 40 TABLET ORAL at 15:25

## 2019-09-10 RX ADMIN — SODIUM CHLORIDE 120 ML/HR: 9 INJECTION, SOLUTION INTRAVENOUS at 15:26

## 2019-09-10 RX ADMIN — TRANEXAMIC ACID 1000 MG: 100 INJECTION, SOLUTION INTRAVENOUS at 10:58

## 2019-09-10 RX ADMIN — FENTANYL CITRATE 100 MCG: 50 INJECTION, SOLUTION INTRAMUSCULAR; INTRAVENOUS at 10:51

## 2019-09-10 RX ADMIN — ONDANSETRON 4 MG: 2 INJECTION INTRAMUSCULAR; INTRAVENOUS at 12:31

## 2019-09-10 NOTE — ANESTHESIA PROCEDURE NOTES
Spinal Block      Patient reassessed immediately prior to procedure    Patient location during procedure: OR  Start Time: 9/10/2019 10:53 AM  Stop Time: 9/10/2019 10:58 AM  Indication:at surgeon's request  Performed By  Anesthesiologist: Sami Macedo MD  CRNA: Monalisa Wong  Preanesthetic Checklist  Completed: patient identified, site marked, surgical consent, pre-op evaluation, timeout performed, IV checked, risks and benefits discussed and monitors and equipment checked  Spinal Block Prep:  Patient Position:sitting  Sterile Tech:cap, gloves, sterile barriers and mask  Prep:Chloraprep  Patient Monitoring:blood pressure monitoring, continuous pulse oximetry and EKG  Spinal Block Procedure  Approach:midline  Guidance:landmark technique and palpation technique  Location:L4-L5  Needle Type:Sprotte  Needle Gauge:25 G  Placement of Spinal needle event:cerebrospinal fluid aspirated  Paresthesia: no  Fluid Appearance:clear  Medications: bupivacaine (MARCAINE) 0.5 % injection, 2 mL  Med Administered at 9/10/2019 11:55 AM   Post Assessment  Patient Tolerance:patient tolerated the procedure well with no apparent complications  Complications no  Additional Notes  Procedure:  Pt assisted to sitting position, with legs in position of comfort over side of bed.  Pt. instructed in optimal spine presentation, the spine was prepped/ Draped and the skin at insertion site was anesthetized with 1% Lidocaine 2 ml.  The spinal needle was then advanced until CSF flow was obtained and LA was injected:

## 2019-09-10 NOTE — THERAPY EVALUATION
Patient Name: Lilia Murcia  : 1945    MRN: [id number]                              Today's Date: 9/10/2019       Admit Date: 9/10/2019    Visit Dx:     ICD-10-CM ICD-9-CM   1. Impaired functional mobility, balance, gait, and endurance Z74.09 V49.89   2. Primary osteoarthritis of left knee M17.12 715.16     Patient Active Problem List   Diagnosis   • Primary osteoarthritis of left knee   • Primary osteoarthritis of right knee   • Status post total left knee replacement   • HTN (hypertension)   • Prediabetes     Past Medical History:   Diagnosis Date   • Arthritis    • Asthma    • GERD (gastroesophageal reflux disease)    • Hiatal hernia     still present    • Hypertension    • Skin cancer     basal cell still present on top of head   • Wears glasses      Past Surgical History:   Procedure Laterality Date   • BREAST EXCISIONAL BIOPSY Right 2013   • CATARACT EXTRACTION      bilat    • CHOLECYSTECTOMY     • COLONOSCOPY     • ENDOSCOPY     • HYSTERECTOMY      total    • INJECTION OF MEDICATION      x5 bilat knee cortisone    • OOPHORECTOMY       General Information     Row Name 09/10/19 1603          PT Evaluation Time/Intention    Document Type  evaluation  -LR     Mode of Treatment  physical therapy;individual therapy  -LR     Row Name 09/10/19 1603          General Information    Patient Profile Reviewed?  yes  -LR     Prior Level of Function  min assist:;all household mobility;community mobility;ADL's;home management;cooking;cleaning;using stairs;gait;transfer;bed mobility;shopping;independent:;driving all mobility limited by pain  -LR     Existing Precautions/Restrictions  fall;other (see comments) R adductor canal nerve catheter  -LR     Barriers to Rehab  previous functional deficit  -LR     Row Name 09/10/19 1603          Relationship/Environment    Lives With  alone family and friends to assist at all times initially upon d/c home  -LR     Row Name 09/10/19 1603          Resource/Environmental  Concerns    Current Living Arrangements  home/apartment/condo  -LR     Row Name 09/10/19 1603          Home Main Entrance    Number of Stairs, Main Entrance  three  -LR     Stair Railings, Main Entrance  none  -LR     Row Name 09/10/19 1603          Stairs Within Home, Primary    Number of Stairs, Within Home, Primary  none  -LR     Row Name 09/10/19 1603          Cognitive Assessment/Intervention- PT/OT    Orientation Status (Cognition)  oriented x 4  -LR     Row Name 09/10/19 1603          Safety Issues, Functional Mobility    Safety Issues Affecting Function (Mobility)  insight into deficits/self awareness;safety precaution awareness;sequencing abilities;safety precautions follow-through/compliance  -LR       User Key  (r) = Recorded By, (t) = Taken By, (c) = Cosigned By    Initials Name Provider Type    LR Hortensia Marin, PT Physical Therapist        Mobility     Row Name 09/10/19 1603          Bed Mobility Assessment/Treatment    Bed Mobility Assessment/Treatment  supine-sit  -LR     Supine-Sit [place] (Bed Mobility)  verbal cues;supervision  -LR     Assistive Device (Bed Mobility)  head of bed elevated;bed rails  -LR     Comment (Bed Mobility)  Verbal cues to move LEs towards EOB and to push up from bed to raise trunk into sitting and to scoot hips out to get feet on floor. Denied dizziness upon sitting up.   -LR     Row Name 09/10/19 1603          Transfer Assessment/Treatment    Comment (Transfers)  Verbal cues to push up from bed to stand and to reach back for chair to lower into sitting. Verbal cues to step L LE out before t/f for comfort.  -LR     Row Name 09/10/19 1603          Sit-Stand Transfer    Sit-Stand [place] (Transfers)  verbal cues;contact guard;2 person assist  -LR     Assistive Device (Sit-Stand Transfers)  walker, front-wheeled  -LR     Row Name 09/10/19 1603          Gait/Stairs Assessment/Training    36158 - Gait Training Minutes   8  -LR     [place] Level (Gait)   verbal cues;contact guard;2 person assist  -LR     Assistive Device (Gait)  walker, front-wheeled  -LR     Distance in Feet (Gait)  300  -LR     Pattern (Gait)  step-through  -LR     Deviations/Abnormal Patterns (Gait)  bilateral deviations;mally decreased;gait speed decreased;stride length decreased  -LR     Bilateral Gait Deviations  heel strike decreased  -LR     Left Sided Gait Deviations  weight shift ability decreased  -LR     Comment (Gait/Stairs)  Patient ambulated with step through gait pattern at slow pace. Verbal cues for increased L LE weight bearing/stance phase, decreased UE weight bearing, and increased L knee flexion during swing phase. Improved with cues for correction. Gait limited by pain.   -LR     Row Name 09/10/19 1603          Mobility Assessment/Intervention    Extremity Weight-bearing Status  left lower extremity  -LR     Left Lower Extremity (Weight-bearing Status)  weight-bearing as tolerated (WBAT)  -LR       User Key  (r) = Recorded By, (t) = Taken By, (c) = Cosigned By    Initials Name Provider Type    Hortensia Higgins, PT Physical Therapist        Obj/Interventions     Row Name 09/10/19 1603          General ROM    GENERAL ROM COMMENTS  R LE AROM WFL; L knee AROM impaired 25%, will formally measure POD#1  -LR     Row Name 09/10/19 1603          MMT (Manual Muscle Testing)    General MMT Comments  R LE WFL; L knee functionally 4-/5, independent with SLR, no knee buckling with gait  -LR     Row Name 09/10/19 1603          Therapeutic Exercise    Lower Extremity (Therapeutic Exercise)  gluteal sets;quad sets, left  -LR     Lower Extremity Range of Motion (Therapeutic Exercise)  ankle dorsiflexion/plantar flexion, left  -LR     Exercise Type (Therapeutic Exercise)  AROM (active range of motion);isotonic contraction, concentric;isometric contraction, static  -LR     Position (Therapeutic Exercise)  supine  -LR     Sets/Reps (Therapeutic Exercise)  x10 reps each  -LR     Comment  (Therapeutic Exercise)  cues for technique; able to actively DF bilaterally  -LR     Row Name 09/10/19 1603          Sensory Assessment/Intervention    Sensory General Assessment  -- denies numbness/tingling;light touch equal and intact  -LR       User Key  (r) = Recorded By, (t) = Taken By, (c) = Cosigned By    Initials Name Provider Type    LR Hortensia Marin, PT Physical Therapist        Goals/Plan     Row Name 09/10/19 1603          Bed Mobility Goal 1 (PT)    Activity/Assistive Device (Bed Mobility Goal 1, PT)  sit to supine/supine to sit  -LR     [place] Level/Cues Needed (Bed Mobility Goal 1, PT)  conditional independence  -LR     Time Frame (Bed Mobility Goal 1, PT)  long term goal (LTG);3 days  -LR     Progress/Outcomes (Bed Mobility Goal 1, PT)  goal ongoing  -     Row Name 09/10/19 1603          Transfer Goal 1 (PT)    Activity/Assistive Device (Transfer Goal 1, PT)  sit-to-stand/stand-to-sit;walker, rolling  -LR     [place] Level/Cues Needed (Transfer Goal 1, PT)  conditional independence  -LR     Time Frame (Transfer Goal 1, PT)  long term goal (LTG);3 days  -LR     Progress/Outcome (Transfer Goal 1, PT)  goal ongoing  -     Row Name 09/10/19 1603          Gait Training Goal 1 (PT)    Activity/Assistive Device (Gait Training Goal 1, PT)  gait (walking locomotion);walker, rolling  -LR     [place] Level (Gait Training Goal 1, PT)  conditional independence  -LR     Distance (Gait Goal 1, PT)  500 feet  -LR     Time Frame (Gait Training Goal 1, PT)  long term goal (LTG);3 days  -LR     Progress/Outcome (Gait Training Goal 1, PT)  goal ongoing  -     Row Name 09/10/19 1603          ROM Goal 1 (PT)    ROM Goal 1 (PT)  0-90 degrees AAROM   -LR     Time Frame (ROM Goal 1, PT)  long term goal (LTG);3 days  -LR     Progress/Outcome (ROM Goal 1, PT)  goal ongoing  -     Row Name 09/10/19 1603          Stairs Goal 1 (PT)    Activity/Assistive Device (Stairs Goal 1, PT)  ascending  stairs;descending stairs;step-to-step;cane, straight;other (see comments) HHA  -LR     [place] Level/Cues Needed (Stairs Goal 1, PT)  contact guard assist  -LR     Number of Stairs (Stairs Goal 1, PT)  3  -LR     Time Frame (Stairs Goal 1, PT)  long term goal (LTG);3 days  -LR     Progress/Outcome (Stairs Goal 1, PT)  goal ongoing  -LR       User Key  (r) = Recorded By, (t) = Taken By, (c) = Cosigned By    Initials Name Provider Type    LR Hortensia Marin, PT Physical Therapist        Clinical Impression     Row Name 09/10/19 1603          Pain Assessment    Additional Documentation  Pain Scale: Numbers Pre/Post-Treatment (Group)  -LR     Row Name 09/10/19 1603          Pain Scale: Numbers Pre/Post-Treatment    Pain Scale: Numbers, Pretreatment  2/10  -LR     Pain Scale: Numbers, Post-Treatment  7/10  -LR     Pain Location - Side  Left  -LR     Pain Location - Orientation  anterior  -LR     Pain Location  knee  -LR     Pain Intervention(s)  Ambulation/increased activity;Repositioned;Cold applied  -LR     Row Name 09/10/19 1603          Plan of Care Review    Plan of Care Reviewed With  patient  -LR     Row Name 09/10/19 1603          Physical Therapy Clinical Impression    Patient/Family Goals Statement (PT Clinical Impression)  go home, decrease pain  -LR     Criteria for Skilled Interventions Met (PT Clinical Impression)  yes;treatment indicated  -LR     Rehab Potential (PT Clinical Summary)  good, to achieve stated therapy goals  -LR     Row Name 09/10/19 1603          Positioning and Restraints    Pre-Treatment Position  in bed  -LR     Post Treatment Position  chair  -LR     In Chair  notified nsg;reclined;sitting;call light within reach;encouraged to call for assist;exit alarm on;with family/caregiver;legs elevated;compression device  -LR       User Key  (r) = Recorded By, (t) = Taken By, (c) = Cosigned By    Initials Name Provider Type    Hortensia Higgins, PT Physical Therapist         Outcome Measures     Row Name 09/10/19 1603          How much help from another person do you currently need...    Turning from your back to your side while in flat bed without using bedrails?  3  -LR     Moving from lying on back to sitting on the side of a flat bed without bedrails?  3  -LR     Moving to and from a bed to a chair (including a wheelchair)?  3  -LR     Standing up from a chair using your arms (e.g., wheelchair, bedside chair)?  3  -LR     Climbing 3-5 steps with a railing?  2  -LR     To walk in hospital room?  3  -LR     AM-PAC 6 Clicks Score (PT)  17  -LR     Row Name 09/10/19 1603          Functional Assessment    Outcome Measure Options  AM-PAC 6 Clicks Basic Mobility (PT)  -LR       User Key  (r) = Recorded By, (t) = Taken By, (c) = Cosigned By    Initials Name Provider Type    Hortensia Higgins, PT Physical Therapist        Physical Therapy Education     Title: PT OT SLP Therapies (Done)     Topic: Physical Therapy (Done)     Point: Mobility training (Done)     Learning Progress Summary           Patient Acceptance, E,D, VU,NR by LR at 9/10/2019  4:03 PM    Comment:  Educated on precautions, weight bearing status, correct supine to sit t/f technique, correct sit<->stand t/f technique, correct gait mechanics, and progression of POC.                   Point: Home exercise program (Done)     Learning Progress Summary           Patient Acceptance, E,D, VU,NR by LR at 9/10/2019  4:03 PM    Comment:  Educated on precautions, weight bearing status, correct supine to sit t/f technique, correct sit<->stand t/f technique, correct gait mechanics, and progression of POC.                   Point: Body mechanics (Done)     Learning Progress Summary           Patient Acceptance, E,D, VU,NR by LR at 9/10/2019  4:03 PM    Comment:  Educated on precautions, weight bearing status, correct supine to sit t/f technique, correct sit<->stand t/f technique, correct gait mechanics, and progression of POC.                    Point: Precautions (Done)     Learning Progress Summary           Patient Acceptance, E,D, VU,NR by LR at 9/10/2019  4:03 PM    Comment:  Educated on precautions, weight bearing status, correct supine to sit t/f technique, correct sit<->stand t/f technique, correct gait mechanics, and progression of POC.                               User Key     Initials Effective Dates Name Provider Type Discipline    LR 06/19/15 -  Hortensia Marin, PT Physical Therapist PT              PT Recommendation and Plan  Planned Therapy Interventions (PT Eval): balance training, bed mobility training, home exercise program, patient/family education, ROM (range of motion), transfer training, strengthening  Outcome Summary/Treatment Plan (PT)  Anticipated Equipment Needs at Discharge (PT): front wheeled walker(needs yamileth RW)  Anticipated Discharge Disposition (PT): home with assist, home with home health  Plan of Care Reviewed With: patient  Progress: improving  Outcome Summary: Patient ambulated 300 feet with RW and step through gait pattern, limited by pain. ROM to be initiated POD#1. Plan is d/c home with family and HHPT. Progress to stair training in AM to prepare for d/c home. Encouraged patient to ambulate with nursing again tonight. PADD score of 8.      Time Calculation:   PT Charges     Row Name 09/10/19 1603             Time Calculation    Start Time  1603  -LR      PT Received On  09/10/19  -      PT Goal Re-Cert Due Date  09/20/19  -         Time Calculation- PT    Total Timed Code Minutes- PT  10 minute(s)  -LR         Timed Charges    77533 - PT Therapeutic Exercise Minutes  2  -LR      91324 - Gait Training Minutes   8  -LR        User Key  (r) = Recorded By, (t) = Taken By, (c) = Cosigned By    Initials Name Provider Type    LR Hortensia Marin, PT Physical Therapist        Therapy Charges for Today     Code Description Service Date Service Provider Modifiers Qty    72446902340 HC GAIT  TRAINING EA 15 MIN 9/10/2019 Hortensia Marin, PT GP 1    26739018561 HC PT THER SUPP EA 15 MIN 9/10/2019 Hortensia Marin, PT GP 2    31385153081 HC PT EVAL LOW COMPLEXITY 3 9/10/2019 Hortensia Marin, PT GP 1          PT G-Codes  Outcome Measure Options: AM-PAC 6 Clicks Basic Mobility (PT)  AM-PAC 6 Clicks Score (PT): 17    Hortensia Marin, PT  9/10/2019

## 2019-09-10 NOTE — ANESTHESIA PROCEDURE NOTES
Peripheral Block      Patient reassessed immediately prior to procedure    Patient location during procedure: post-op  Reason for block: at surgeon's request and post-op pain management  Performed by  CRNA: Sami Agosto, CRNA  Assisted by: Mckenzie Sears RN  Preanesthetic Checklist  Completed: patient identified, site marked, surgical consent, pre-op evaluation, timeout performed, IV checked, risks and benefits discussed and monitors and equipment checked  Prep:  Pt Position: supine  Sterile barriers:cap, gloves, mask and sterile barriers  Prep: ChloraPrep  Patient monitoring: blood pressure monitoring, continuous pulse oximetry and EKG  Procedure  Performed under: spinal  Guidance:ultrasound guided  Images:still images obtained, printed/placed on chart    Block Type:adductor canal block  Injection Technique:catheter  Needle Type:Tuohy and echogenic  Needle Gauge:18 G  Resistance on Injection: none  Catheter Size:20 G (20g)  Cath Depth at skin: 14 cm    Medications Used: bupivacaine PF (MARCAINE) 0.25 % injection, 20 mL  Med admintered at 9/10/2019 1:00 PM      Post Assessment  Injection Assessment: negative aspiration for heme, incremental injection and no paresthesia on injection  Patient Tolerance:comfortable throughout block  Complications:no  Additional Notes  Procedure:             The pt was placed in the Supine position.  The Insertion site was  prepped and Draped in sterile fashion.  The pt was anesthetized with  IV Sedation( see meds).  Skin and cutaneous tissue was infiltrated and anesthetized with 1% Lidocaine 3 mls via a 25g needle.  A BBraun 4 inch 18g echogenic needle was then  inserted approximately midline, mid-thigh and advanced In-plane with Ultrasound guidance.  Normal Saline PSF was utilized for hydrodissection of tissue.  The Vastus medialis and Sartorius muscle where visualized and the needle tip was placed in the adductor canal,  lateral to the femoral artery.  LA injection spread was  visualized, injection was incremental 1-5ml, injection pressure was normal or little, no intraneural injection, no vascular injection.  LA dose was injected thru the needle(see dose above).  A BBraun 20g wire stylet catheter was placed via the needle with ultrasound visualization and confirmation with NS fluid bolus. The labeled Catheter was then secured to skin at insertion site with skin afix and steristrips to curled catheter and CHG transparent dressing.  Thank you.

## 2019-09-10 NOTE — H&P
Patient Name: Lilia Murcia  MRN: [id number]  : 1945  DOS: 9/10/2019    Attending: Jeffrey Sutton MD    Primary Care Provider: Alton Jordan DO      Chief complaint:  Left knee pain    Subjective   Patient is a 74 y.o. female presented for left total knee arthroplasty by Dr. Sutton.    She underwent surgery under spinal anesthesia. She tolerated surgery well and is admitted for further medical management. Her knee has been painful for many years. Conservative treatments failed to provide long term pain relief. She denies use of assistive device for ambulation. She had a trip and fall in .    When seen postop she is doing well. Her pain is well controlled. She denies nausea, shortness of breath or chest pain. No hx of DVT or PE.    (Above is noted, agree.  Doing well when seen in her room afterwards.  Good pain control.  Ambulated with  feet with a rolling walker and step through gait pattern limited by pain.  No nausea or vomiting, no shortness of breath.  Currently sitting in her recliner.)wy       Allergies:  Allergies   Allergen Reactions   • Sulfa Antibiotics Rash and Unknown (See Comments)     May or may not be a reaction. Has been years since a reaction.        Meds:  Medications Prior to Admission   Medication Sig Dispense Refill Last Dose   • Cholecalciferol (VITAMIN D PO) Take 2,000 mg by mouth Daily.   2019 at 0800   • hydrochlorothiazide (HYDRODIURIL) 25 MG tablet Take 25 mg by mouth Daily.   2019 at 0800   • omeprazole (priLOSEC) 20 MG capsule Take 20 mg by mouth Daily As Needed.   2019 at 0800   • quinapril (ACCUPRIL) 40 MG tablet Take 40 mg by mouth Daily.   2019 at 0800   • albuterol sulfate  (90 Base) MCG/ACT inhaler Inhale 2 puffs Every 6 (Six) Hours As Needed for Wheezing.   More than a month at Unknown time   • aspirin 81 MG chewable tablet Chew 81 mg Daily.   9/3/2019       History:   Past Medical History:   Diagnosis Date   • Arthritis    •  "Asthma    • GERD (gastroesophageal reflux disease)    • Hiatal hernia     still present    • Hypertension    • Skin cancer     basal cell still present on top of head   • Wears glasses      Past Surgical History:   Procedure Laterality Date   • BREAST EXCISIONAL BIOPSY Right 11/12/2013   • CATARACT EXTRACTION      bilat    • CHOLECYSTECTOMY     • COLONOSCOPY     • ENDOSCOPY     • HYSTERECTOMY      total    • INJECTION OF MEDICATION      x5 bilat knee cortisone    • OOPHORECTOMY       Family History   Problem Relation Age of Onset   • Breast cancer Neg Hx    • Ovarian cancer Neg Hx      Social History     Tobacco Use   • Smoking status: Never Smoker   • Smokeless tobacco: Never Used   Substance Use Topics   • Alcohol use: No   • Drug use: No   She is a  and has 3 children. She is retired from [employer].     Review of Systems  All systems were reviewed and negative except for:  Gastrointestinal: positive for  heartburn    Vital Signs  Visit Vitals  /68 (BP Location: Right arm, Patient Position: Lying)   Pulse 73   Temp 97.3 °F (36.3 °C) (Temporal)   Resp 14   Ht 154.9 cm (61\")   Wt 75.1 kg (165 lb 9.1 oz)   SpO2 97%   BMI 31.28 kg/m²       Physical Exam:    General Appearance:    Alert, cooperative, in no acute distress   Head:    Normocephalic, without obvious abnormality, atraumatic   Eyes:            Lids and lashes normal, conjunctivae and sclerae normal, no   icterus, no pallor, corneas clear   Ears:    Ears appear intact with no abnormalities noted   Neck:   No adenopathy, supple, trachea midline, no thyromegaly   Lungs:     Clear to auscultation,respirations regular, even and                   unlabored    Heart:    Regular rhythm and normal rate, normal S1 and S2, no            murmur, no gallop   Abdomen:     Normal bowel sounds, no masses, no organomegaly, soft        non-tender, non-distended, no guarding, no rebound                 tenderness   Genitalia:    Deferred   Extremities:   Left " knee ACE wrap CDI. Nerve block present   Pulses:   Pulses palpable and equal bilaterally   Skin:   No bleeding, bruising or rash   Neurologic:   Cranial nerves 2 - 12 grossly intact, limited movement and sensation BLE due to lingering effects of spinal block. Flexion and dorsiflexion intact bilateral feet.  (Follow-up exam.  Intact flexion and dorsiflexion bilateral feet.)moira ROSADO reviewed the patient's new clinical results.       Results from last 7 days   Lab Units 09/10/19  0857   POTASSIUM mmol/L 4.2     Lab Results   Component Value Date    HGBA1C 5.90 (H) 08/27/2019     Results for SOY COLE (MRN [id number]) as of 9/10/2019 15:01   Ref. Range 8/27/2019 11:44   Glucose Latest Ref Range: 65 - 99 mg/dL 103 (H)   Sodium Latest Ref Range: 136 - 145 mmol/L 143   Potassium Latest Ref Range: 3.5 - 5.2 mmol/L 4.3   CO2 Latest Ref Range: 22.0 - 29.0 mmol/L 30.0 (H)   Chloride Latest Ref Range: 98 - 107 mmol/L 103   Anion Gap Latest Ref Range: 5.0 - 15.0 mmol/L 10.0   Creatinine Latest Ref Range: 0.57 - 1.00 mg/dL 0.63   BUN Latest Ref Range: 8 - 23 mg/dL 17   BUN/Creatinine Ratio Latest Ref Range: 7.0 - 25.0  27.0 (H)   Calcium Latest Ref Range: 8.6 - 10.5 mg/dL 9.4   eGFR Non  Am Latest Ref Range: >60 mL/min/1.73 93   C-Reactive Protein Latest Ref Range: 0.00 - 0.50 mg/dL 0.32   Protime Latest Ref Range: 11.2 - 14.3 Seconds 13.0   INR Latest Ref Range: 0.85 - 1.16  1.03   PTT Latest Ref Range: 24.0 - 37.0 seconds 26.7   WBC Latest Ref Range: 3.40 - 10.80 10*3/mm3 6.32   RBC Latest Ref Range: 3.77 - 5.28 10*6/mm3 4.17   Hemoglobin Latest Ref Range: 12.0 - 15.9 g/dL 11.7 (L)   Hematocrit Latest Ref Range: 34.0 - 46.6 % 38.2   RDW Latest Ref Range: 12.3 - 15.4 % 15.0   MCV Latest Ref Range: 79.0 - 97.0 fL 91.6   MCH Latest Ref Range: 26.6 - 33.0 pg 28.1   MCHC Latest Ref Range: 31.5 - 35.7 g/dL 30.6 (L)   MPV Latest Ref Range: 6.0 - 12.0 fL 9.9   Platelets Latest Ref Range: 140 - 450 10*3/mm3 256      Assessment and Plan:     Status post total left knee replacement    Primary osteoarthritis of left knee    HTN (hypertension)    Prediabetes      Plan  1. PT/OT- early ambulation postop  2. Pain control-prns, AC nerve block   3. IS-encourage  4. DVT proph- mechs/ASA  5. Bowel regimen  6. Resume home medications as appropriate  7. Monitor post-op labs  8. Discharge planning for home ( with HHPT and help from family initially)wy    HTN  - Continue home lisinopril  - hold HCTZ  - Monitor BP   - Holding parameters for BP meds  - Labetalol PRN for SBP>170    PreDM  - hgb A1c on 8/27/19 5.9  - Accu-Chek AC and HS with low dose SSI      PHANI Wadsworth  09/10/19  3:02 PM     I have personally performed the evaluation on this patient. My history is consistent  with HPI obtained. My exam findings are listed above. I have personally reviewed and discussed the above formulated treatment plan with pt, son and .PHANI.wy

## 2019-09-10 NOTE — PLAN OF CARE
Problem: Patient Care Overview  Goal: Plan of Care Review  Outcome: Ongoing (interventions implemented as appropriate)   09/10/19 1618   Coping/Psychosocial   Plan of Care Reviewed With patient   Plan of Care Review   Progress improving   OTHER   Outcome Summary Pt. has had moderate pain, since coming up from PACU, [place] was given. She voided 600mL. VSS, on room air. Pt. worked with PT and did really well and is up in the chair. Will continue to monitor.        Problem: Pain, Chronic (Adult)  Goal: Identify Related Risk Factors and Signs and Symptoms  Outcome: Ongoing (interventions implemented as appropriate)   09/10/19 1618   Pain, Chronic (Adult)   Related Risk Factors (Chronic Pain) surgery;procedures/treatments   Signs and Symptoms (Chronic Pain) fatigue/weakness;verbalization of pain descriptors     Goal: Acceptable Pain/Comfort Level and Functional Ability  Outcome: Ongoing (interventions implemented as appropriate)   09/10/19 1618   Pain, Chronic (Adult)   Acceptable Pain/Comfort Level and Functional Ability making progress toward outcome       Problem: Fall Risk (Adult)  Goal: Identify Related Risk Factors and Signs and Symptoms  Outcome: Ongoing (interventions implemented as appropriate)   09/10/19 1618   Fall Risk (Adult)   Related Risk Factors (Fall Risk) age-related changes;fatigue/slow reaction;history of falls;environment unfamiliar;sensory deficits   Signs and Symptoms (Fall Risk) presence of risk factors     Goal: Absence of Fall  Outcome: Ongoing (interventions implemented as appropriate)   09/10/19 1618   Fall Risk (Adult)   Absence of Fall making progress toward outcome       Problem: Knee Arthroplasty (Total, Partial) (Adult)  Goal: Signs and Symptoms of Listed Potential Problems Will be Absent, Minimized or Managed (Knee Arthroplasty)  Outcome: Ongoing (interventions implemented as appropriate)   09/10/19 1618   Goal/Outcome Evaluation   Problems Assessed (Knee Arthroplasty) all   Problems  Present (Knee Arthroplasty) pain;functional deficit

## 2019-09-10 NOTE — PLAN OF CARE
Problem: Patient Care Overview  Goal: Plan of Care Review  Outcome: Ongoing (interventions implemented as appropriate)   09/10/19 1603   Coping/Psychosocial   Plan of Care Reviewed With patient   Plan of Care Review   Progress improving   OTHER   Outcome Summary Patient ambulated 300 feet with RW and step through gait pattern, limited by pain. ROM to be initiated POD#1. Plan is d/c home with family and HHPT. Progress to stair training in AM to prepare for d/c home. Encouraged patient to ambulate with nursing again tonight. PADD score of 8.        Problem: Knee Arthroplasty (Total, Partial) (Adult)  Goal: Signs and Symptoms of Listed Potential Problems Will be Absent, Minimized or Managed (Knee Arthroplasty)  Outcome: Ongoing (interventions implemented as appropriate)   09/10/19 1603   Goal/Outcome Evaluation   Problems Assessed (Knee Arthroplasty) functional deficit;pain;range of motion decreased   Problems Present (Knee Arthroplasty) functional deficit;pain;range of motion decreased

## 2019-09-10 NOTE — OP NOTE
DATE OF PROCEDURE: 09/10/19    PREOPERATIVE DIAGNOSIS: left knee arthritis      POSTOPERATIVE DIAGNOSIS:  left knee arthritis    PROCEDURES PERFORMED:   left total knee arthroplasty with [employer] components (#4 narrow posterior stabilized femur, #2 tibia, 10 mm polyethylene, with 32 three peg patella).     SURGEON: Jeffrey Sutton MD    ASSISTANT: Monalisa Robertson PA-C     SPECIMENS: None    IMPLANTS:   Implants     Implant    Cmt Bone Simplex/P Full Dose 10/Pk - [id number] - Implanted   (Left)    Inventory item: Cmt Bone Simplex/P Full Dose 10/Pk Model/Cat number: [id number]    : [employer] Lot number: [id number]    As of 9/10/2019     Status: Implanted                  Base Tib/Kn Gen2 Nonpor Ti Sz2 Lt - [id number] - Implanted   (Left) Knee    Inventory item: Base Tib/Kn Gen2 Nonpor Ti Sz2 Lt Model/Cat number: [id number]    : [employer] Lot number: [id number]    As of 9/10/2019     Status: Implanted                  Comp Fem Legion Oxinium Ps Nrw Sz4n Lt - [id number] - Implanted   (Left) Knee    Inventory item: Comp Fem Legion Oxinium Ps Nrw Sz4n Lt Model/Cat number: [id number]    : [employer] Lot number: [id number]    As of 9/10/2019     Status: Implanted                  Patella Resrf Gen2 7.5x32mm - [id number] - Implanted   (Left) Patella    Inventory item: Patella Resrf Gen2 7.5x32mm Model/Cat number: [id number]    : [employer] Lot number: [id number]    As of 9/10/2019     Status: Implanted                  Insrt Art Legion Ps Hf Xlpe Sz1to2 10mm - [id number] - Implanted   (Left) Patella    Inventory item: Insrt Art Legion Ps Hf Xlpe Sz1to2 10mm Model/Cat number: [id number]    : [employer] Lot number: [id number]    As of 9/10/2019     Status: Implanted                         ANESTHESIA:  Spinal    STAFF:  Circulator: Mike Jane RN; Tawnya Saldaña RN  Scrub Person: Marquez Morris; Raul Scruggs  Nursing Assistant:  Collette Brown  Assistant: Monalisa Robertson PA-C  Orientee: Apryl Ignacio    TOURNIQUET TIME: 17 minutes    ESTIMATED BLOOD LOSS: 100ml     COMPLICATIONS: None    PREOPERATIVE ANTIBIOTICS: Ancef 2 g    INDICATIONS: The patient is a 74 y.o. female with debilitating left knee pain secondary to advanced osteoarthritis that failed to improve in spite of conservative treatment .  Options have been discussed at length with the patient and the patient has had an extended course of conservative treatment without long-term benefit. The patient has reached the point where the patient desires total knee arthroplasty surgery and understands the risks, benefits, and alternatives. Consent was obtained. Please see my office notes for details with regard to preoperative counseling and operative rationale.     DESCRIPTION OF PROCEDURE: The patient was positively identified in the preoperative holding area and brought to the operating suite and placed in a supine position. After adequate spinal anesthetic had been achieved, the left lower extremity was prepped and draped in the usual sterile fashion.  After application of a tourniquet to the left upper thigh, which was used during the procedure for a total 17 minutes during the cementation process only. Landmarks of the knee were identified and timeout procedure was performed to confirm the operative site, as well as other parameters. Following the sterile prep and drape, a skin incision was made just off the medial aspect of midline for a medial parapatellar approach. Following a sharp skin incision, dissection was carried down to the level of the extensor mechanism and a medial parapatellar arthrotomy was made and the patella was tucked into the lateral gutter. Description of arthritis: Bone-on-bone contact medial compartment, tricompartmental osteophytes, and advanced patellofemoral degeneration.  The knee was adequately exposed and a distal femoral cut was made with an  intramedullary guide. This was subsequently sized for a #4 implant and anterior, posterior chamfer cuts made. The menisci removed both medially and laterally.  The ACL was transected and the tibia was subluxed anteriorly. Proximal tibia cut was made with the external alignment guide. The cut was noted to be perpendicular to the tibial axis, with symmetric flexion and extension gaps. Therefore, final tibial preparations to accommodate a #2 tibia were made, followed by final femoral preparations for the box region. With a trial 10 insert in place, full flexion and extension was noted with no instability. The patella was then prepared for a 32 three peg patella which had excellent tracking. All trial components were removed and final components were cemented in place with namely a #2 tibia, #4 narrow posterior stabilized femur and a 32 three peg patella with a trial 11 insert for cement compression. All the excess cement was removed from the bone implant interface and allowed to harden. Tourniquet was deflated. Hemostasis was obtained with electrocautery. There was no brisk bleeding noted in the popliteal fossa in particular. Therefore, the knee was copiously irrigated as it was between major steps, and the final 10 insert was placed as this was deemed appropriate for the patient's anatomy with full flexion and extension and no instability and attention was then directed towards closure. The medial parapatellar arthrotomy was closed with #1 Vicryl in an interrupted figure-of-eight fashion in 4 strategic locations followed by oversewing this from proximal to distal with a #1 StrataFix symmetric, which nicely sealed the joint, followed by closure of the deep fascial layer with #1 Vicryl in a buried interrupted fashion, followed by closure of the subcutaneous layer with 2-0 Vicryl and the skin with 3-0 Stratafix in a running subcuticular fashion. Prineo wound closure dressing was applied followed by a sterile dressing  with 4 x 4's, abdominal pad, soft roll and Ace wrap. The patient tolerated the procedure well and was brought to the recovery room in good condition.     PLAN:  1.  The patient will begin early range of motion and weight-bearing per the post total knee arthroplasty protocol.   2.  I anticipate brief hospitalization for initial rehabilitation and pain control followed by continued rehabilitation in either home health or supervised setting.   3.  Postoperative medical management with Dr. Tenorio.  4.  Aspirin will be utilized for DVT prophylaxis unless there is a contraindication.       Jeffrey Sutton MD  09/10/19  12:40 PM

## 2019-09-10 NOTE — ANESTHESIA POSTPROCEDURE EVALUATION
Patient: Lilia Murcia    Procedure Summary     Date:  09/10/19 Room / Location:   [facility] /  [facility]    Anesthesia Start:  1049 Anesthesia Stop:      Procedure:  TOTAL KNEE LEFT ARTHROPLASTY (Left Knee) Diagnosis:       Primary osteoarthritis of left knee      (Primary osteoarthritis of left knee [M17.12])    Surgeon:  Jeffrey Sutton MD Provider:  Sami Macedo MD    Anesthesia Type:  regional, spinal ASA Status:  3          Anesthesia Type: regional, spinal  Last vitals  BP   134/69 (09/10/19 1300)   Temp   97.5 °F (36.4 °C) (09/10/19 1300)   Pulse   79 (09/10/19 1300)   Resp   14 (09/10/19 1300)     SpO2   97 % (09/10/19 1300)     Post Anesthesia Care and Evaluation    Patient location during evaluation: PACU  Patient participation: complete - patient participated  Level of consciousness: awake and alert  Pain score: 0  Pain management: adequate  Airway patency: patent  Anesthetic complications: No anesthetic complications  PONV Status: none  Cardiovascular status: hemodynamically stable and acceptable  Respiratory status: nonlabored ventilation, acceptable and nasal cannula  Hydration status: acceptable

## 2019-09-10 NOTE — ANESTHESIA PREPROCEDURE EVALUATION
Anesthesia Evaluation     Patient summary reviewed and Nursing notes reviewed   no history of anesthetic complications:  NPO Solid Status: > 8 hours  NPO Liquid Status: > 2 hours           Airway   Mallampati: II  TM distance: >3 FB  Neck ROM: full  No difficulty expected  Dental - normal exam   (+) implants    Pulmonary     breath sounds clear to auscultation  (+) asthma,   Cardiovascular   Exercise tolerance: good (4-7 METS)    ECG reviewed  Rhythm: regular  Rate: normal    (+) hypertension well controlled 2 medications or greater,       Neuro/Psych  GI/Hepatic/Renal/Endo    (+) morbid obesity, hiatal hernia, GERD well controlled,    (-)  obesity    Musculoskeletal     Abdominal   (+) obese,     Abdomen: soft.   Substance History      OB/GYN          Other   (+) arthritis   history of cancer active    ROS/Med Hx Other: BASAL CELL                  Anesthesia Plan    ASA 3     regional and spinal     intravenous induction   Anesthetic plan, all risks, benefits, and alternatives have been provided, discussed and informed consent has been obtained with: patient.    Plan discussed with CRNA.

## 2019-09-10 NOTE — H&P
Pre-Op H&P  Lilia Murcia  [id number]  1945    Chief complaint: Bilateral knee pain, L>R    HPI:  6/5/19 office visit:Lilia Murcia is a 73 y.o. female.  Follows up today for both her knees.  She continues to have pain in both knees, left greater than right.  She would like to consider left knee replacement surgery at this time.  She would like to wait at least 3 months, and therefore would like injections in the meantime.  However, the pain in the right knee is 5 out of 10, and the pain in the left knee is 10 out of 10.  The pain is aching, associate with swelling, grinding and stiffness.  Pain is worse with walking, standing and climbing stairs.  She does have pain with work and leisure activities, as well as some night pain.  No improvement with anti-inflammatories long-term, but brief relief with injections in the past.  No history of clots or clotting disorders.    9/10/19:  Here today to undergo left total knee arthroplasty    Review of Systems:  General ROS: negative for chills, fever or skin lesions;  No changes since last office visit  Cardiovascular ROS: no chest pain or dyspnea on exertion  Respiratory ROS: no cough, shortness of breath, or wheezing.  +asthma, well controlled    Allergies:   Allergies   Allergen Reactions   • Sulfa Antibiotics Rash and Unknown (See Comments)     May or may not be a reaction. Has been years since a reaction.        Home Meds:    No current facility-administered medications on file prior to encounter.      Current Outpatient Medications on File Prior to Encounter   Medication Sig Dispense Refill   • Chlorhexidine Gluconate 4 % solution Apply topically to the appropriate area as directed Daily. 237 mL 0   • hydrochlorothiazide (HYDRODIURIL) 25 MG tablet Take 25 mg by mouth Daily.     • mupirocin (BACTROBAN) 2 % ointment Apply into the nostril(s) as directed by provider 2 (Two) Times a Day. 22 g 0   • omeprazole (priLOSEC) 20 MG capsule Take 20 mg by mouth Daily As  "Needed.     • quinapril (ACCUPRIL) 40 MG tablet Take 40 mg by mouth Daily.     • albuterol sulfate  (90 Base) MCG/ACT inhaler Inhale 2 puffs Every 6 (Six) Hours As Needed for Wheezing.     • aspirin 81 MG chewable tablet Chew 81 mg Daily.         PMH:   Past Medical History:   Diagnosis Date   • Arthritis    • Asthma    • GERD (gastroesophageal reflux disease)    • Hiatal hernia     still present    • Hypertension    • Skin cancer     basal cell still present on top of head   • Wears glasses      PSH:    Past Surgical History:   Procedure Laterality Date   • BREAST EXCISIONAL BIOPSY Right 11/12/2013   • CATARACT EXTRACTION      bilat    • CHOLECYSTECTOMY     • COLONOSCOPY     • ENDOSCOPY     • HYSTERECTOMY      total    • INJECTION OF MEDICATION      x5 bilat knee cortisone    • OOPHORECTOMY         Social History:   Tobacco:   Social History     Tobacco Use   Smoking Status Never Smoker   Smokeless Tobacco Never Used      Alcohol:     Social History     Substance and Sexual Activity   Alcohol Use No       Vitals:           /80 (BP Location: Right arm, Patient Position: Lying)   Pulse 80   Temp 98.4 °F (36.9 °C) (Temporal)   Resp 18   Ht 154.9 cm (61\")   Wt 75.1 kg (165 lb 9.1 oz)   SpO2 98%   BMI 31.28 kg/m²     Physical Exam:  General Appearance:    Alert, cooperative, no distress, appears stated age   Head:    Normocephalic, without obvious abnormality, atraumatic   Lungs:     Clear to auscultation bilaterally, respirations unlabored    Heart:   Regular rate and rhythm, S1 and S2 normal, no murmur, rub    or gallop    Abdomen:    Soft, non-tender.  +bowel sounds   Breast Exam:    deferred   Genitalia:    deferred   Extremities:   Extremities normal, atraumatic, no cyanosis or edema   Skin:   Skin color, texture, turgor normal, no rashes or lesions   Neurologic:   Grossly intact   Results Review  LABS:  Lab Results   Component Value Date    WBC 6.32 08/27/2019    HGB 11.7 (L) 08/27/2019    HCT " 38.2 08/27/2019    MCV 91.6 08/27/2019     08/27/2019    NEUTROABS 3.56 08/27/2019    GLUCOSE 103 (H) 08/27/2019    BUN 17 08/27/2019    CREATININE 0.63 08/27/2019    EGFRIFNONA 93 08/27/2019     08/27/2019    K 4.2 09/10/2019     08/27/2019    CO2 30.0 (H) 08/27/2019    CALCIUM 9.4 08/27/2019       RADIOLOGY:  Imaging Results (last 72 hours)     ** No results found for the last 72 hours. **          I reviewed the patient's new clinical results.    Cancer Staging (if applicable)  Cancer Patient: __ yes _x_no __unknown; If yes, clinical stage T:__ N:__M:__, stage group or __N/A    Impression: Bilateral knee osteoarthritis s/p bilateral knee injections 6/5/19    Plan: She has exhausted conservative treatment options.  Please see my counseling note for details regarding the left total knee replacement.       Surgical Counseling      I have informed the patient of the diagnosis and the prognosis.  Exhaustive conservative treatment modalities have not resulted in long term pain relief.  The symptoms have progressed to the point of daily pain and inability to perform activities of daily living without significant pain. The patient has reached the point of desiring to proceed with total knee arthroplasty after discussing the risks, benefits and alternatives to the procedure.  The surgical procedure itself was discussed in detail. Risks of the procedure were discussed, which included but are not limited to, bleeding, infection, damage to blood vessels and nerves, incomplete pain relief, loosening of the prosthesis, deep infection, need for further surgery, loss of limb, deep venous thrombosis, pulmonary embolus, death, heart attack, stroke, kidney failure, liver failure, and anesthetic complications.  In addition, the potential for deep infection developing in the future was discussed, which could require further surgery.  The knee would have to be re-opened, debrided, and potentially remove the  prosthesis, which may or may not be replaced in the future.  Also, the possibility for loosening of the prosthesis has been mentioned.  If the prosthesis loosened, a revision arthroplasty could be performed, with results that are not as predictable compared to the original procedure.  The typical rehabilitative course has also been discussed, and full recovery may take up to a year to see the maximum benefit.  The importance of patient cooperation in the rehabilitative efforts has also been discussed.  No guarantees whatsoever were given.  The patient understands the potential risks versus the benefits and desires to proceed with total knee arthroplasty      Jeffrey Sutton MD   9/10/2019   10:14 AM     Agree with above.  Plan for left total knee arthroplasty.    Jeffrey Sutton MD  09/10/19  10:14 AM

## 2019-09-11 VITALS
DIASTOLIC BLOOD PRESSURE: 57 MMHG | TEMPERATURE: 98.1 F | RESPIRATION RATE: 18 BRPM | HEART RATE: 72 BPM | WEIGHT: 165.57 LBS | HEIGHT: 61 IN | BODY MASS INDEX: 31.26 KG/M2 | SYSTOLIC BLOOD PRESSURE: 122 MMHG | OXYGEN SATURATION: 96 %

## 2019-09-11 PROBLEM — D62 ACUTE BLOOD LOSS ANEMIA: Status: ACTIVE | Noted: 2019-09-11

## 2019-09-11 PROBLEM — G89.18 ACUTE POSTOPERATIVE PAIN: Status: ACTIVE | Noted: 2019-09-11

## 2019-09-11 PROBLEM — D72.829 LEUKOCYTOSIS: Status: ACTIVE | Noted: 2019-09-11

## 2019-09-11 LAB
ANION GAP SERPL CALCULATED.3IONS-SCNC: 11 MMOL/L (ref 5–15)
BUN BLD-MCNC: 13 MG/DL (ref 8–23)
BUN/CREAT SERPL: 21 (ref 7–25)
CALCIUM SPEC-SCNC: 8.8 MG/DL (ref 8.6–10.5)
CHLORIDE SERPL-SCNC: 103 MMOL/L (ref 98–107)
CO2 SERPL-SCNC: 26 MMOL/L (ref 22–29)
CREAT BLD-MCNC: 0.62 MG/DL (ref 0.57–1)
DEPRECATED RDW RBC AUTO: 49.3 FL (ref 37–54)
ERYTHROCYTE [DISTWIDTH] IN BLOOD BY AUTOMATED COUNT: 14.7 % (ref 12.3–15.4)
GFR SERPL CREATININE-BSD FRML MDRD: 94 ML/MIN/1.73
GLUCOSE BLD-MCNC: 144 MG/DL (ref 65–99)
GLUCOSE BLDC GLUCOMTR-MCNC: 133 MG/DL (ref 70–130)
GLUCOSE BLDC GLUCOMTR-MCNC: 159 MG/DL (ref 70–130)
HCT VFR BLD AUTO: 32.2 % (ref 34–46.6)
HGB BLD-MCNC: 10.1 G/DL (ref 12–15.9)
MCH RBC QN AUTO: 28.8 PG (ref 26.6–33)
MCHC RBC AUTO-ENTMCNC: 31.4 G/DL (ref 31.5–35.7)
MCV RBC AUTO: 91.7 FL (ref 79–97)
PLATELET # BLD AUTO: 242 10*3/MM3 (ref 140–450)
PMV BLD AUTO: 10 FL (ref 6–12)
POTASSIUM BLD-SCNC: 4.6 MMOL/L (ref 3.5–5.2)
RBC # BLD AUTO: 3.51 10*6/MM3 (ref 3.77–5.28)
SODIUM BLD-SCNC: 140 MMOL/L (ref 136–145)
WBC NRBC COR # BLD: 15.12 10*3/MM3 (ref 3.4–10.8)

## 2019-09-11 PROCEDURE — A9270 NON-COVERED ITEM OR SERVICE: HCPCS | Performed by: NURSE PRACTITIONER

## 2019-09-11 PROCEDURE — 63710000001 PANTOPRAZOLE 40 MG TABLET DELAYED-RELEASE: Performed by: NURSE PRACTITIONER

## 2019-09-11 PROCEDURE — 25010000003 CEFAZOLIN IN DEXTROSE 2-4 GM/100ML-% SOLUTION: Performed by: ORTHOPAEDIC SURGERY

## 2019-09-11 PROCEDURE — A9270 NON-COVERED ITEM OR SERVICE: HCPCS | Performed by: ORTHOPAEDIC SURGERY

## 2019-09-11 PROCEDURE — 97110 THERAPEUTIC EXERCISES: CPT

## 2019-09-11 PROCEDURE — 63710000001 DOCUSATE SODIUM 100 MG CAPSULE: Performed by: ORTHOPAEDIC SURGERY

## 2019-09-11 PROCEDURE — 97535 SELF CARE MNGMENT TRAINING: CPT

## 2019-09-11 PROCEDURE — 97165 OT EVAL LOW COMPLEX 30 MIN: CPT

## 2019-09-11 PROCEDURE — 63710000001 MELOXICAM 7.5 MG TABLET: Performed by: ORTHOPAEDIC SURGERY

## 2019-09-11 PROCEDURE — 63710000001 HYDROCODONE-ACETAMINOPHEN 5-325 MG TABLET: Performed by: ORTHOPAEDIC SURGERY

## 2019-09-11 PROCEDURE — 63710000001 ASPIRIN EC 325 MG TABLET DELAYED-RELEASE: Performed by: ORTHOPAEDIC SURGERY

## 2019-09-11 PROCEDURE — 80048 BASIC METABOLIC PNL TOTAL CA: CPT | Performed by: NURSE PRACTITIONER

## 2019-09-11 PROCEDURE — 85027 COMPLETE CBC AUTOMATED: CPT | Performed by: ORTHOPAEDIC SURGERY

## 2019-09-11 PROCEDURE — 97116 GAIT TRAINING THERAPY: CPT

## 2019-09-11 PROCEDURE — 99024 POSTOP FOLLOW-UP VISIT: CPT | Performed by: ORTHOPAEDIC SURGERY

## 2019-09-11 PROCEDURE — 63710000001 LISINOPRIL 40 MG TABLET: Performed by: NURSE PRACTITIONER

## 2019-09-11 PROCEDURE — 82962 GLUCOSE BLOOD TEST: CPT

## 2019-09-11 RX ORDER — PSEUDOEPHEDRINE HCL 30 MG
100 TABLET ORAL 2 TIMES DAILY PRN
Qty: 60 EACH | Refills: 0
Start: 2019-09-11 | End: 2020-09-09

## 2019-09-11 RX ORDER — ASPIRIN 325 MG
325 TABLET, DELAYED RELEASE (ENTERIC COATED) ORAL DAILY
Qty: 30 TABLET | Refills: 0 | Status: SHIPPED | OUTPATIENT
Start: 2019-09-12 | End: 2020-09-09

## 2019-09-11 RX ORDER — ASPIRIN 81 MG/1
81 TABLET, CHEWABLE ORAL DAILY
Status: ON HOLD
Start: 2019-09-11 | End: 2021-01-05 | Stop reason: SDUPTHER

## 2019-09-11 RX ORDER — HYDROCODONE BITARTRATE AND ACETAMINOPHEN 5; 325 MG/1; MG/1
1 TABLET ORAL EVERY 4 HOURS PRN
Qty: 40 TABLET | Refills: 0 | Status: SHIPPED | OUTPATIENT
Start: 2019-09-11 | End: 2019-09-20

## 2019-09-11 RX ADMIN — ASPIRIN 325 MG: 325 TABLET, DELAYED RELEASE ORAL at 08:52

## 2019-09-11 RX ADMIN — MELOXICAM 15 MG: 7.5 TABLET ORAL at 08:52

## 2019-09-11 RX ADMIN — HYDROCODONE BITARTRATE AND ACETAMINOPHEN 1 TABLET: 5; 325 TABLET ORAL at 08:59

## 2019-09-11 RX ADMIN — LISINOPRIL 40 MG: 40 TABLET ORAL at 08:52

## 2019-09-11 RX ADMIN — SODIUM CHLORIDE, PRESERVATIVE FREE 3 ML: 5 INJECTION INTRAVENOUS at 08:51

## 2019-09-11 RX ADMIN — CEFAZOLIN SODIUM 2 G: 2 INJECTION, SOLUTION INTRAVENOUS at 02:38

## 2019-09-11 RX ADMIN — HYDROCODONE BITARTRATE AND ACETAMINOPHEN 1 TABLET: 5; 325 TABLET ORAL at 00:30

## 2019-09-11 RX ADMIN — PANTOPRAZOLE SODIUM 40 MG: 40 TABLET, DELAYED RELEASE ORAL at 08:53

## 2019-09-11 RX ADMIN — DOCUSATE SODIUM 100 MG: 100 CAPSULE, LIQUID FILLED ORAL at 00:30

## 2019-09-11 NOTE — NURSING NOTE
Acute Pain Service:  Peripheral nerve catheter and disposable infusion device teaching completed with patient.  Discussion, handout and bracelet provided with [facility] on call central phone number.  Instructed to call with any questions or concerns.  Patient verbalized understanding.  Service will continue to follow until catheter DC'd.  Please contact patient at [phone] if needed.

## 2019-09-11 NOTE — PLAN OF CARE
Problem: Patient Care Overview  Goal: Plan of Care Review  Outcome: Outcome(s) achieved Date Met: 09/11/19 09/11/19 0574   Coping/Psychosocial   Plan of Care Reviewed With patient   OTHER   Outcome Summary OT IE completed. No POC established as pt set to d/c home today with support and HH to follow. Pt demonstrates good safety awareness and reports good pain control. SBA all mobility and transfers. Min A LB ADLs. Pt declines any AE or DME needs. Friend to stay 1-2 days and son lives next door. OT to d/c at this time.

## 2019-09-11 NOTE — PLAN OF CARE
Problem: Patient Care Overview  Goal: Plan of Care Review   09/11/19 0038   Coping/Psychosocial   Plan of Care Reviewed With patient   Plan of Care Review   Progress improving   OTHER   Outcome Summary A&Ox4, mood/affect appropriate. Speech clear/logical. Elastic bandage to the RLE CDI. Pain well controlled with PO analgesic. Right heel elevated on pilow. Ambulates in hallway with one person assist and walker. Voids without difficulty. VS WNL. Will cont to mx. Call light in reach.

## 2019-09-11 NOTE — DISCHARGE SUMMARY
Patient Name: Lilia Murcia  MRN: [id number]  : 1945  DOS: 2019    Attending: Jeffrey Sutton MD    Primary Care Provider: Alton Jordan DO    Date of Admission:.9/10/2019  8:18 AM    Date of Discharge:  2019    Discharge Diagnosis:     Status post total left knee replacement    Primary osteoarthritis of left knee    HTN (hypertension)    Prediabetes    Leukocytosis, likely reactive    Acute blood loss anemia, mild, likely reactive    Acute postoperative pain      Hospital Course  Patient is a 74 y.o. female presented for left total knee arthroplasty by Dr. Sutton.     She underwent surgery under spinal anesthesia. She tolerated surgery well and was admitted for further medical management. Her knee has been painful for many years. Conservative treatments failed to provide long term pain relief. She denies use of assistive device for ambulation. She had a trip and fall in .    Patient was provided pain medications as needed for pain control, along with adductor canal nerve block infusion of Ropivacaine.      Adjustments were made to pain medications to optimize postop pain management. Risks and benefits of opiate medications discussed with patient.    She was seen by PT and OT and has progressed well over her stay.  She used an IS for atelectasis prophylaxis and aspirin along with mechanicals for DVT prophylaxis.  Home medications were resumed as appropriate, and labs were monitored and remained fairly stable.     With the progress she has made, she is ready for DC home today.    She will have an Arrow pump ( instructed on it during this admit).  Discussed with patient regarding plan and she shows understanding and agreement.    Patient will have HHPT following discharge.        Procedures Performed  DATE OF PROCEDURE: 09/10/19     PREOPERATIVE DIAGNOSIS: left knee arthritis       POSTOPERATIVE DIAGNOSIS:  left knee arthritis     PROCEDURES PERFORMED:   left total knee arthroplasty with  "[employer] components (#4 narrow posterior stabilized femur, #2 tibia, 10 mm polyethylene, with 32 three peg patella).      SURGEON: Jeffrey Sutton MD    Pertinent Test Results:    I reviewed the patient's new clinical results.   Results from last 7 days   Lab Units 19  0733   WBC 10*3/mm3 15.12*   HEMOGLOBIN g/dL 10.1*   HEMATOCRIT % 32.2*   PLATELETS 10*3/mm3 242     Results for SOY COLE (MRN [id number]) as of 2019 11:26   Ref. Range 2019 11:44   Hemoglobin Latest Ref Range: 12.0 - 15.9 g/dL 11.7 (L)   Hematocrit Latest Ref Range: 34.0 - 46.6 % 38.2     Results from last 7 days   Lab Units 19  0733 09/10/19  0857   SODIUM mmol/L 140  --    POTASSIUM mmol/L 4.6 4.2   CHLORIDE mmol/L 103  --    CO2 mmol/L 26.0  --    BUN mg/dL 13  --    CREATININE mg/dL 0.62  --    CALCIUM mg/dL 8.8  --    GLUCOSE mg/dL 144*  --      Results for SOY COLE (MRN [id number]) as of 2019 11:26   Ref. Range 9/10/2019 16:24 9/10/2019 20:45 2019 07:33 2019 07:51   Glucose Latest Ref Range: 70 - 130 mg/dL 137 (H) 237 (H) 144 (H) 133 (H)     I reviewed the patient's new imaging including images and reports.      Physical therapy: Patient ambulated 300 feet with RW and step through gait pattern, limited by pain. ROM to be initiated POD#1. Plan is d/c home with family and HHPT. Progress to stair training in AM to prepare for d/c home. Encouraged patient to ambulate with nursing again tonight. PADD score of 8.     Discharge Assessment:    Vital Signs  Visit Vitals  /63 (BP Location: Left arm, Patient Position: Lying)   Pulse 85   Temp 98.4 °F (36.9 °C) (Oral)   Resp 20   Ht 154.9 cm (61\")   Wt 75.1 kg (165 lb 9.1 oz)   SpO2 96%   BMI 31.28 kg/m²     Temp (24hrs), Av.7 °F (36.5 °C), Min:97.1 °F (36.2 °C), Max:98.4 °F (36.9 °C)      General Appearance:    Alert, cooperative, in no acute distress   Lungs:     Clear to auscultation,respirations regular, even and             "       unlabored    Heart:    Regular rhythm and normal rate, normal S1 and S2   Abdomen:     Normal bowel sounds, no masses, no organomegaly, soft        non-tender, non-distended, no guarding, no rebound                 tenderness   Extremities:   Moves all extremities well, no edema, no cyanosis, no              Redness. Left knee Prineo CDI. Nerve block present   Pulses:   Pulses palpable and equal bilaterally   Skin:   No bleeding, bruising or rash   Neurologic:   Cranial nerves 2 - 12 grossly intact, sensation intact. Flexion and dorsiflexion intact bilateral feet.       Discharge Disposition: Home    Discharge Medications     Discharge Medications      New Medications      Instructions Start Date   docusate sodium 100 MG capsule   100 mg, Oral, 2 Times Daily PRN      HYDROcodone-acetaminophen 5-325 MG per tablet  Commonly known as:  NORCO   1 tablet, Oral, Every 4 Hours PRN         Changes to Medications      Instructions Start Date   aspirin 81 MG chewable tablet  What changed:  additional instructions  Notes to patient:  RESUME IN 1 MONTH   81 mg, Oral, Daily, Resume in 1 month      aspirin  MG tablet  What changed:  You were already taking a medication with the same name, and this prescription was added. Make sure you understand how and when to take each.   325 mg, Oral, Daily   Start Date:  9/12/2019        Continue These Medications      Instructions Start Date   albuterol sulfate  (90 Base) MCG/ACT inhaler  Commonly known as:  PROVENTIL HFA;VENTOLIN HFA;PROAIR HFA   2 puffs, Inhalation, Every 6 Hours PRN      hydrochlorothiazide 25 MG tablet  Commonly known as:  HYDRODIURIL   25 mg, Oral, Daily      omeprazole 20 MG capsule  Commonly known as:  priLOSEC   20 mg, Oral, Daily PRN      quinapril 40 MG tablet  Commonly known as:  ACCUPRIL   40 mg, Oral, Daily      VITAMIN D PO   2,000 mg, Oral, Daily             Discharge Diet: Consistent carb diet    Activity at Discharge: WBAT LLE    Follow-up  Appointments  Dr. Sutton per his orders      PHANI Wadsworth  09/11/19  11:26 AM     I have personally performed the evaluation on this patient. My history is consistant  with above documentation . My exam finding are listed above. I have personally reviewed and discussed the above formulated discharge plan with patient and Milagro

## 2019-09-11 NOTE — THERAPY DISCHARGE NOTE
Patient Name: Lilia Murcia  : 1945    MRN: [id number]                              Today's Date: 2019       Admit Date: 9/10/2019    Visit Dx:     ICD-10-CM ICD-9-CM   1. Impaired functional mobility, balance, gait, and endurance Z74.09 V49.89   2. Primary osteoarthritis of left knee M17.12 715.16   3. Impaired mobility and ADLs Z74.09 799.89   4. Status post total left knee replacement Z96.652 V43.65     Patient Active Problem List   Diagnosis   • Primary osteoarthritis of left knee   • Primary osteoarthritis of right knee   • Status post total left knee replacement   • HTN (hypertension)   • Prediabetes   • Leukocytosis, likely reactive   • Acute blood loss anemia, mild, likely reactive   • Acute postoperative pain     Past Medical History:   Diagnosis Date   • Arthritis    • Asthma    • GERD (gastroesophageal reflux disease)    • Hiatal hernia     still present    • Hypertension    • Skin cancer     basal cell still present on top of head   • Wears glasses      Past Surgical History:   Procedure Laterality Date   • BREAST EXCISIONAL BIOPSY Right 2013   • CATARACT EXTRACTION      bilat    • CHOLECYSTECTOMY     • COLONOSCOPY     • ENDOSCOPY     • HYSTERECTOMY      total    • INJECTION OF MEDICATION      x5 bilat knee cortisone    • OOPHORECTOMY     • TOTAL KNEE ARTHROPLASTY Left 9/10/2019    Procedure: TOTAL KNEE LEFT ARTHROPLASTY;  Surgeon: Jeffrey Sutton MD;  [facility];  Service: Orthopedics     General Information     Row Name 19 1006          PT Evaluation Time/Intention    Document Type  therapy note (daily note);discharge evaluation/summary  -LR     Mode of Treatment  physical therapy;individual therapy  -LR     Row Name 19 1006          General Information    Patient Profile Reviewed?  yes  -LR     Existing Precautions/Restrictions  fall;other (see comments) L adductor canal nerve catheter  -LR     Row Name 19 1006          Cognitive  Assessment/Intervention- PT/OT    Orientation Status (Cognition)  oriented x 4  -LR     Row Name 09/11/19 1006          Safety Issues, Functional Mobility    Safety Issues Affecting Function (Mobility)  awareness of need for assistance;insight into deficits/self awareness;safety precautions follow-through/compliance;safety precaution awareness;positioning of assistive device  -LR       User Key  (r) = Recorded By, (t) = Taken By, (c) = Cosigned By    Initials Name Provider Type    LR Hortensia Marin, PT Physical Therapist        Mobility     Row Name 09/11/19 1006          Bed Mobility Assessment/Treatment    Supine-Sit [place] (Bed Mobility)  not tested  -LR     Comment (Bed Mobility)  UIC on arrival, UIC at end of treatment.   -LR     Row Name 09/11/19 1006          Transfer Assessment/Treatment    Comment (Transfers)  Verbal cues for correct hand placement with t/f and to step L LE out before t/f. Assisted to and from bathroom. Cues for not pivoting/twisting on L LE when turnning and cues for management of RW during turns.   -LR     Row Name 09/11/19 1006          Sit-Stand Transfer    Sit-Stand [place] (Transfers)  verbal cues;supervision  -LR     Assistive Device (Sit-Stand Transfers)  walker, front-wheeled  -LR     Row Name 09/11/19 1006          Gait/Stairs Assessment/Training    91967 - Gait Training Minutes   15  -LR     [place] Level (Gait)  verbal cues;contact guard  -LR     Assistive Device (Gait)  walker, front-wheeled  -LR     Distance in Feet (Gait)  400  -LR     Pattern (Gait)  step-through  -LR     Deviations/Abnormal Patterns (Gait)  left sided deviations;antalgic;bilateral deviations;mally decreased;gait speed decreased;stride length decreased  -LR     Bilateral Gait Deviations  heel strike decreased  -LR     Left Sided Gait Deviations  weight shift ability decreased  -LR     [place] Level (Stairs)  verbal cues;contact guard  -LR     Assistive Device (Stairs)  cane,  straight;other (see comments) HHA  -LR     Handrail Location (Stairs)  none  -LR     Ascending Technique (Stairs)  step-to-step  -LR     Descending Technique (Stairs)  step-to-step  -LR     Comment (Gait/Stairs)  Patient ambulated with step through gait pattern at slow pace. Good upright posture and step length. Verbal cues for increased L knee flexion during swing phase and increased L knee extension during stance phase. Improved with cues for correction. Gait limited by pain. Verbal cues to take steps one at a time and to step up with strong LE first and down with weak LE first. Verbal cues for correct placement and sequencing of SPC. SPC on R, HHA on L. No LOB or unsteadiness with stairs.   -LR     Row Name 09/11/19 1006          Mobility Assessment/Intervention    Extremity Weight-bearing Status  left lower extremity  -LR     Left Lower Extremity (Weight-bearing Status)  weight-bearing as tolerated (WBAT)  -LR       User Key  (r) = Recorded By, (t) = Taken By, (c) = Cosigned By    Initials Name Provider Type    LR Hortensia Marin, PT Physical Therapist        Obj/Interventions     Row Name 09/11/19 1006          General ROM    GENERAL ROM COMMENTS  L knee 9-90 degrees; lacking 9 degrees extension AROM, 90 degrees flexion AAROM in sitting.   -LR     Row Name 09/11/19 1006          Therapeutic Exercise    Lower Extremity (Therapeutic Exercise)  heel slides, left;LAQ (long arc quad), left;quad sets, left;SAQ (short arc quad), left;SLR (straight leg raise), left heel slides in sitting and reclined; standing calf raises  -LR     Lower Extremity Range of Motion (Therapeutic Exercise)  ankle dorsiflexion/plantar flexion, left  -LR     Exercise Type (Therapeutic Exercise)  AROM (active range of motion);isotonic contraction, concentric;isometric contraction, static  -LR     Position (Therapeutic Exercise)  seated;standing  -LR     Sets/Reps (Therapeutic Exercise)  x15 reps each  -LR     Comment (Therapeutic  Exercise)  cues for technique; no assist required  -LR       User Key  (r) = Recorded By, (t) = Taken By, (c) = Cosigned By    Initials Name Provider Type    LR Hortensia Marin, PT Physical Therapist        Goals/Plan     Row Name 09/11/19 1006          Bed Mobility Goal 1 (PT)    Activity/Assistive Device (Bed Mobility Goal 1, PT)  sit to supine/supine to sit  -LR     [place] Level/Cues Needed (Bed Mobility Goal 1, PT)  conditional independence  -LR     Time Frame (Bed Mobility Goal 1, PT)  long term goal (LTG);3 days  -LR     Progress/Outcomes (Bed Mobility Goal 1, PT)  goal not met;discharged from [facility]  -     Row Name 09/11/19 1006          Transfer Goal 1 (PT)    Activity/Assistive Device (Transfer Goal 1, PT)  sit-to-stand/stand-to-sit;walker, rolling  -LR     [place] Level/Cues Needed (Transfer Goal 1, PT)  conditional independence  -LR     Time Frame (Transfer Goal 1, PT)  long term goal (LTG);3 days  -LR     Progress/Outcome (Transfer Goal 1, PT)  goal not met;discharged from [facility]  -     Row Name 09/11/19 1006          Gait Training Goal 1 (PT)    Activity/Assistive Device (Gait Training Goal 1, PT)  gait (walking locomotion);walker, rolling  -LR     [place] Level (Gait Training Goal 1, PT)  conditional independence  -LR     Distance (Gait Goal 1, PT)  500 feet  -LR     Time Frame (Gait Training Goal 1, PT)  long term goal (LTG);3 days  -LR     Progress/Outcome (Gait Training Goal 1, PT)  goal not met;discharged from [facility]  -     Row Name 09/11/19 1006          ROM Goal 1 (PT)    ROM Goal 1 (PT)  0-90 degrees AAROM  -LR     Time Frame (ROM Goal 1, PT)  long term goal (LTG);3 days  -LR     Progress/Outcome (ROM Goal 1, PT)  goal partially met;discharged from [facility] achieved flexion goal  -     Row Name 09/11/19 1006          Stairs Goal 1 (PT)    Activity/Assistive Device (Stairs Goal 1, PT)  ascending stairs;descending stairs;step-to-step;cane, straight;other (see  comments) HHA  -LR     [place] Level/Cues Needed (Stairs Goal 1, PT)  contact guard assist  -LR     Number of Stairs (Stairs Goal 1, PT)  3  -LR     Time Frame (Stairs Goal 1, PT)  long term goal (LTG);3 days  -LR     Progress/Outcome (Stairs Goal 1, PT)  goal met  -LR       User Key  (r) = Recorded By, (t) = Taken By, (c) = Cosigned By    Initials Name Provider Type    LR Hortensia Marin, PT Physical Therapist        Clinical Impression     Row Name 09/11/19 1006          Pain Assessment    Additional Documentation  Pain Scale: Numbers Pre/Post-Treatment (Group)  -LR     Row Name 09/11/19 1006          Pain Scale: Numbers Pre/Post-Treatment    Pain Scale: Numbers, Pretreatment  4/10  -LR     Pain Scale: Numbers, Post-Treatment  5/10  -LR     Pain Location - Side  Left  -LR     Pain Location - Orientation  posterior  -LR     Pain Location  knee  -LR     Pain Intervention(s)  Ambulation/increased activity;Repositioned;Cold applied  -LR     Row Name 09/11/19 1006          Plan of Care Review    Plan of Care Reviewed With  patient  -LR     [facility] Name 09/11/19 1006          Positioning and Restraints    Pre-Treatment Position  sitting in chair/recliner  -LR     Post Treatment Position  chair  -LR     In Chair  notified nsg;reclined;sitting;call light within reach;encouraged to call for assist;exit alarm on;legs elevated  -LR       User Key  (r) = Recorded By, (t) = Taken By, (c) = Cosigned By    Initials Name Provider Type    Hortensia Higgins, PT Physical Therapist        Outcome Measures     Row Name 09/11/19 1006          How much help from another person do you currently need...    Turning from your back to your side while in flat bed without using bedrails?  4  -LR     Moving from lying on back to sitting on the side of a flat bed without bedrails?  4  -LR     Moving to and from a bed to a chair (including a wheelchair)?  3  -LR     Standing up from a chair using your arms (e.g., wheelchair,  bedside chair)?  3  -LR     Climbing 3-5 steps with a railing?  3  -LR     To walk in hospital room?  3  -LR     AM-PAC 6 Clicks Score (PT)  20  -LR     Row Name 09/11/19 1006          Functional Assessment    Outcome Measure Options  AM-PAC 6 Clicks Basic Mobility (PT)  -LR       User Key  (r) = Recorded By, (t) = Taken By, (c) = Cosigned By    Initials Name Provider Type    LR Hortensia Marin, PT Physical Therapist        Physical Therapy Education     Title: PT OT SLP Therapies (Done)     Topic: Physical Therapy (Done)     Point: Mobility training (Done)     Learning Progress Summary           Patient Acceptance, E,D,H, VU,DU by LR at 9/11/2019 10:06 AM    Comment:  Issued and reviewed written/illustrated HEP. Educated on precautions, weight bearing status, correct sit<->stand t/f technique, correct gait mechanics, correct stair training technique, and correct car t/f technique.    Acceptance, E,D, VU,NR by LR at 9/10/2019  4:03 PM    Comment:  Educated on precautions, weight bearing status, correct supine to sit t/f technique, correct sit<->stand t/f technique, correct gait mechanics, and progression of POC.                   Point: Home exercise program (Done)     Learning Progress Summary           Patient Acceptance, E,D,H, VU,DU by LR at 9/11/2019 10:06 AM    Comment:  Issued and reviewed written/illustrated HEP. Educated on precautions, weight bearing status, correct sit<->stand t/f technique, correct gait mechanics, correct stair training technique, and correct car t/f technique.    Acceptance, E,D, VU,NR by LR at 9/10/2019  4:03 PM    Comment:  Educated on precautions, weight bearing status, correct supine to sit t/f technique, correct sit<->stand t/f technique, correct gait mechanics, and progression of POC.                   Point: Body mechanics (Done)     Learning Progress Summary           Patient Acceptance, E,D,H, VU,DU by LR at 9/11/2019 10:06 AM    Comment:  Issued and reviewed  written/illustrated HEP. Educated on precautions, weight bearing status, correct sit<->stand t/f technique, correct gait mechanics, correct stair training technique, and correct car t/f technique.    Acceptance, E,D, VU,NR by LR at 9/10/2019  4:03 PM    Comment:  Educated on precautions, weight bearing status, correct supine to sit t/f technique, correct sit<->stand t/f technique, correct gait mechanics, and progression of POC.                   Point: Precautions (Done)     Learning Progress Summary           Patient Acceptance, E,D,H, VU,DU by LR at 9/11/2019 10:06 AM    Comment:  Issued and reviewed written/illustrated HEP. Educated on precautions, weight bearing status, correct sit<->stand t/f technique, correct gait mechanics, correct stair training technique, and correct car t/f technique.    Acceptance, E,D, VU,NR by LR at 9/10/2019  4:03 PM    Comment:  Educated on precautions, weight bearing status, correct supine to sit t/f technique, correct sit<->stand t/f technique, correct gait mechanics, and progression of POC.                               User Key     Initials Effective Dates Name Provider Type Discipline     06/19/15 -  Hortensia Marin, PT Physical Therapist PT              PT Recommendation and Plan  Planned Therapy Interventions (PT Eval): balance training, bed mobility training, home exercise program, patient/family education, ROM (range of motion), transfer training, strengthening  Outcome Summary/Treatment Plan (PT)  Anticipated Equipment Needs at Discharge (PT): front wheeled walker(needs yamileth RW)  Anticipated Discharge Disposition (PT): home with assist, home with home health  Plan of Care Reviewed With: patient  Progress: improving  Outcome Summary: Patient ambulated 400 feet with RW and step through gait pattern, limited by pain. Patient climbed 3 steps with SPC and HHA with no difficulty. ROM progressing well, 9-90 degrees. Patient has been d/c home with family and HHPT.       Time Calculation:   PT Charges     Row Name 09/11/19 1006             Time Calculation    Start Time  1006  -LR      PT Received On  09/11/19  -LR      PT Goal Re-Cert Due Date  09/20/19  -LR         Time Calculation- PT    Total Timed Code Minutes- PT  24 minute(s)  -LR         Timed Charges    52049 - PT Therapeutic Exercise Minutes  9  -LR      80214 - Gait Training Minutes   15  -LR        User Key  (r) = Recorded By, (t) = Taken By, (c) = Cosigned By    Initials Name Provider Type    LR Hortensia Marin, PT Physical Therapist        Therapy Charges for Today     Code Description Service Date Service Provider Modifiers Qty    62231442714 HC GAIT TRAINING EA 15 MIN 9/10/2019 Hortensia Marin, PT GP 1    56237367275 HC PT THER SUPP EA 15 MIN 9/10/2019 Hortensia Marin, PT GP 2    08206743316 HC PT EVAL LOW COMPLEXITY 3 9/10/2019 Hortensia Marin, PT GP 1    86757046142 HC PT THER PROC EA 15 MIN 9/11/2019 Hortensia Marin, PT GP 1    10491935579 HC GAIT TRAINING EA 15 MIN 9/11/2019 Hortensia Marin, PT GP 1          PT G-Codes  Outcome Measure Options: AM-PAC 6 Clicks Basic Mobility (PT)  AM-PAC 6 Clicks Score (PT): 20  AM-PAC 6 Clicks Score (OT): 22    PT Discharge Summary  Anticipated Discharge Disposition (PT): home with assist, home with home health  Reason for Discharge: Discharge from facility  Outcomes Achieved: Patient able to partially acheive established goals  Discharge Destination: Home with assist, Home with home health    Hortensia Marin, PT  9/11/2019

## 2019-09-11 NOTE — THERAPY DISCHARGE NOTE
Acute Care - Occupational Therapy Initial Eval/Discharge  [facility]     Patient Name: Lilia Murcia  : 1945  MRN: [id number]  Today's Date: 2019  Onset of Illness/Injury or Date of Surgery: 09/10/19  Date of Referral to OT: 09/10/19  Referring Physician: Herman      Admit Date: 9/10/2019       ICD-10-CM ICD-9-CM   1. Impaired functional mobility, balance, gait, and endurance Z74.09 V49.89   2. Primary osteoarthritis of left knee M17.12 715.16   3. Impaired mobility and ADLs Z74.09 799.89     Patient Active Problem List   Diagnosis   • Primary osteoarthritis of left knee   • Primary osteoarthritis of right knee   • Status post total left knee replacement   • HTN (hypertension)   • Prediabetes     Past Medical History:   Diagnosis Date   • Arthritis    • Asthma    • GERD (gastroesophageal reflux disease)    • Hiatal hernia     still present    • Hypertension    • Skin cancer     basal cell still present on top of head   • Wears glasses      Past Surgical History:   Procedure Laterality Date   • BREAST EXCISIONAL BIOPSY Right 2013   • CATARACT EXTRACTION      bilat    • CHOLECYSTECTOMY     • COLONOSCOPY     • ENDOSCOPY     • HYSTERECTOMY      total    • INJECTION OF MEDICATION      x5 bilat knee cortisone    • OOPHORECTOMY            OT ASSESSMENT FLOWSHEET (last 12 hours)      Occupational Therapy Evaluation     Row Name 19 0745                   OT Evaluation Time/Intention    Subjective Information  no complaints  -TB        Document Type  evaluation;discharge evaluation/summary  -TB        Mode of Treatment  occupational therapy;individual therapy  -TB        Patient Effort  excellent  -TB        Symptoms Noted During/After Treatment  none  -TB        Comment  Increased time for tasks - pt with good questions and safety concerns   -TB           General Information    Patient Profile Reviewed?  yes  -TB        Onset of Illness/Injury or Date of Surgery  09/10/19  -TB        Referring  Physician  Herman  -TB        Patient Observations  alert;cooperative;agree to therapy  -TB        Patient/Family Observations  No family present  -TB        General Observations of Patient  Pt in bed, RA, IV, L adductor canal block, SCDs; exit alarm  -TB        Prior Level of Function  independent:;ADL's;all household mobility;transfer;home management with increased pain and effort  -TB        Equipment Currently Used at Home  grab bar;shower chair;raised toilet  -TB        Pertinent History of Current Functional Problem  Pt presents for surgical mgmt of L knee pain/dysfunction that failed to improve with conservative treatment; s/p L TKA  -TB        Existing Precautions/Restrictions  fall;other (see comments) L adductor canal block  -TB        Barriers to Rehab  none identified  -TB           Relationship/Environment    Primary Source of Support/Comfort  child(deana);friend  -TB        Lives With  alone  -TB        Family Caregiver if Needed  child(deana), adult;friend(s)  -TB        Concerns About Impact on Relationships  Pt's son lives next door, had recent CVA - u/a to provide physical assist; frient to stay 1-2 days following d/c  -TB           Resource/Environmental Concerns    Current Living Arrangements  home/apartment/condo  -TB           Home Main Entrance    Number of Stairs, Main Entrance  three  -TB           Stairs Within Home, Primary    Number of Stairs, Within Home, Primary  none  -TB           Cognitive Assessment/Intervention- PT/OT    Orientation Status (Cognition)  oriented x 4  -TB        Follows Commands (Cognition)  WFL  -TB        Safety Deficit (Cognitive)  safety precautions awareness;safety precautions follow-through/compliance  -TB           Safety Issues, Functional Mobility    Safety Issues Affecting Function (Mobility)  safety precaution awareness;safety precautions follow-through/compliance  -TB        Impairments Affecting Function (Mobility)  pain;range of motion (ROM);strength  -TB            Mobility Assessment/Treatment    Extremity Weight-bearing Status  left lower extremity  -TB        Left Lower Extremity (Weight-bearing Status)  weight-bearing as tolerated (WBAT)  -TB           Bed Mobility Assessment/Treatment    Bed Mobility Assessment/Treatment  supine-sit  -TB        Supine-Sit [place] (Bed Mobility)  supervision  -TB        Comment (Bed Mobility)  Leg  issued, pt moves efficiently  -TB           Functional Mobility    Functional Mobility- Ind. Level  standby assist  -TB        Functional Mobility- Device  rolling walker  -TB        Functional Mobility-Distance (Feet)  30  -TB        Functional Mobility- Safety Issues  balance decreased during turns  -TB        Functional Mobility- Comment  Education reinforced for surgical precautions with change of direction; pt demonstrates understanding  -TB           Transfer Assessment/Treatment    Transfer Assessment/Treatment  sit-stand transfer;stand-sit transfer;toilet transfer;bed-chair transfer  -TB        Comment (Transfers)  cues for hand placement  -TB           Bed-Chair Transfer    Bed-Chair [place] (Transfers)  stand by assist  -TB        Assistive Device (Bed-Chair Transfers)  walker, front-wheeled  -TB           Sit-Stand Transfer    Sit-Stand [place] (Transfers)  stand by assist  -TB        Assistive Device (Sit-Stand Transfers)  walker, front-wheeled  -TB           Stand-Sit Transfer    Stand-Sit [place] (Transfers)  stand by assist  -TB        Assistive Device (Stand-Sit Transfers)  walker, front-wheeled  -TB           Toilet Transfer    Type (Toilet Transfer)  sit-stand;stand-sit  -TB        [place] Level (Toilet Transfer)  stand by assist  -TB        Assistive Device (Toilet Transfer)  walker, front-wheeled;raised toilet seat;grab bars/safety frame  -TB           ADL Assessment/Intervention    78728 - OT Self Care/Mgmt Minutes  20  -TB        BADL Assessment/Intervention  bathing;lower body  dressing;grooming;feeding;toileting;upper body dressing  -TB           Bathing Assessment/Intervention    Bathing [place] Level  lower body  -TB        Assistive Devices (Bathing)  long-handled sponge  -TB        Comment (Bathing)  Education/demonstration completed for use of AE to maximize ADL independence   -TB           Upper Body Dressing Assessment/Training    Upper Body Dressing [place] Level  don;front opening garment;set up  -TB        Comment (Upper Body Dressing)  assist for lines  -TB           Lower Body Dressing Assessment/Training    Lower Body Dressing [place] Level  lower body dressing skills  -TB        Assistive Devices (Lower Body Dressing)  long-handled shoe horn;reacher;sock-aid  -TB        Comment (Lower Body Dressing)  Education/demonstration completed for use of AE to maximize ADL independence   -TB           Grooming Assessment/Training    [place] Level (Grooming)  supervision;wash face, hands;hair care, combing/brushing  -TB        Grooming Position  sink side  -TB        Comment (Grooming)  Education reinforced for RW positioning at counter to avoid LLE pivoting  -TB           Self-Feeding Assessment/Training    [place] Level (Feeding)  feeding skills;independent  -TB           Toileting Assessment/Training    [place] Level (Toileting)  toileting skills;independent  -TB           BADL Safety/Performance    Impairments, BADL Safety/Performance  balance;pain;range of motion;strength  -TB        Skilled BADL Treatment/Intervention  BADL process/adaptation training;adaptive equipment training  -TB           General ROM    GENERAL ROM COMMENTS  BUE intact  -TB           MMT (Manual Muscle Testing)    General MMT Comments  BUE intact  -TB           Motor Assessment/Interventions    Additional Documentation  Balance (Group);Therapeutic Exercise (Group)  -TB           Therapeutic Exercise    Therapeutic Exercise  seated, upper extremities  -TB        Additional  Documentation  Therapeutic Exercise (Row)  -TB           Upper Extremity Seated Therapeutic Exercise    Performed, Seated Upper Extremity (Therapeutic Exercise)  shoulder flexion/extension;shoulder abduction/adduction;shoulder external/internal rotation;shoulder horizontal abduction/adduction;elbow flexion/extension;forearm supination/pronation;wrist flexion/extension;digit flexion/extension  -TB        Exercise Type, Seated Upper Extremity (Therapeutic Exercise)  AROM (active range of motion)  -TB        Restrictions, Seated Upper Extremity (Therapeutic Exercise)  None  -TB        Comment, Seated Upper Extremity (Therapeutic Exercise)  Education completed for benefits of OOB activity/therapy   -TB           Balance    Balance  dynamic sitting balance;dynamic standing balance  -TB           Dynamic Sitting Balance    Level of [place], Reaches Outside Midline (Sitting, Dynamic Balance)  independent  -TB        Sitting Position, Reaches Outside Midline (Sitting, Dynamic Balance)  sitting in chair;sitting on edge of bed commode  -TB        Comment, Reaches Outside Midline (Sitting, Dynamic Balance)  ADL/AE tasks  -TB           Dynamic Standing Balance    Level of [place], Reaches Outside Midline (Standing, Dynamic Balance)  standby assist  -TB        Time Able to Maintain Position, Reaches Outside Midline (Standing, Dynamic Balance)  4 to 5 minutes  -TB        Assistive Device Utilized (Supported Standing, Dynamic Balance)  walker, rolling  -TB        Comment, Reaches Outside Midline (Standing, Dynamic Balance)  ADL tasks  -TB           Sensory Assessment/Intervention    Sensory General Assessment  no sensation deficits identified BUE intact  -TB           Positioning and Restraints    Pre-Treatment Position  in bed  -TB        Post Treatment Position  chair  -TB        In Chair  reclined;call light within reach;encouraged to call for assist;exit alarm on;legs elevated  -TB           Pain Assessment     Additional Documentation  Pain Scale: Word Pre/Post-Treatment (Group)  -TB           Pain Scale: Numbers Pre/Post-Treatment    Pain Location - Side  Left  -TB        Pain Location - Orientation  posterior  -TB        Pain Location  knee  -TB        Pain Intervention(s)  Ambulation/increased activity;Repositioned;Medication (See MAR) L adductor canal block  -TB           Pain Scale: Word Pre/Post-Treatment    Pain: Word Scale, Pretreatment  2 - mild pain  -TB        Pain: Word Scale, Post-Treatment  2 - mild pain  -TB        Pre/Post Treatment Pain Comment  Pt reports good pain control   -TB           Wound 09/10/19 0905 Left knee Incision    Wound - Properties Group Date first assessed: 09/10/19  -JA Time first assessed: 0905  -JA Side: Left  -JA Location: knee  -JA Primary Wound Type: Incision  -JA       Coping    Observed Emotional State  cooperative;hopeful  -TB        Verbalized Emotional State  hopefulness  -TB           Plan of Care Review    Plan of Care Reviewed With  patient  -TB           Clinical Impression (OT)    Date of Referral to OT  09/10/19  -TB        OT Diagnosis  Impaired mobility and ADL  -TB        Patient/Family Goals Statement (OT Eval)  to return home today with support as needed  -TB        Therapy Frequency (OT Eval)  evaluation only  -TB        Care Plan Review (OT)  evaluation/treatment results reviewed;care plan/treatment goals reviewed;risks/benefits reviewed;patient/other agree to care plan  -TB        Anticipated Equipment Needs at Discharge (OT)  front wheeled walker  -TB        Patient/Family Concerns, Equipment Needs at Discharge (OT)  Pt has reacher at home, denies need for additional AE  -TB        Anticipated Discharge Disposition (OT)  home with assist;home with home health  -TB           Vital Signs    Pre Systolic BP Rehab  -- RN cleared OT  -TB        Pre Patient Position  Supine  -TB        Intra Patient Position  Standing  -TB        Post Patient Position  Sitting  -TB            Discharge Summary (Occupational Therapy)    Additional Documentation  Discharge Summary, OT Eval (Group)  -TB           Discharge Summary, OT Eval    Reason for Discharge (OT Discharge Summary)  patient discharged from this facility Anticipate d/c following PT today  -TB           Living Environment    Home Accessibility  stairs to enter home Walk-in shower with seat and rails, ETS  -          User Key  (r) = Recorded By, (t) = Taken By, (c) = Cosigned By    Initials Name Effective Dates     Evi Conway OT 06/08/18 -     Mike Chance RN 06/16/16 -           Occupational Therapy Education     Title: PT OT SLP Therapies (Done)     Topic: Occupational Therapy (Done)     Point: ADL training (Done)     Description: Instruct learner(s) on proper safety adaptation and remediation techniques during self care or transfers.   Instruct in proper use of assistive devices.    Learning Progress Summary           Patient Acceptance, E,D,FERNANDO, BRENDA,DU by  at 9/11/2019  8:52 AM    Comment:  Education completed for benefits of OOB activity/therapy, role of OT, surgical precautions with mobility/ADLs, use of AE and home safety/RW safety/fall prevention.                   Point: Precautions (Done)     Description: Instruct learner(s) on prescribed precautions during self-care and functional transfers.    Learning Progress Summary           Patient Acceptance, E,D,FERNANDO, BRENDA,DU by  at 9/11/2019  8:52 AM    Comment:  Education completed for benefits of OOB activity/therapy, role of OT, surgical precautions with mobility/ADLs, use of AE and home safety/RW safety/fall prevention.                               User Key     Initials Effective Dates Name Provider Type Discipline     06/08/18 -  Evi Conway OT Occupational Therapist OT                OT Recommendation and Plan  Outcome Summary/Treatment Plan (OT)  Anticipated Equipment Needs at Discharge (OT): front wheeled walker  Patient/Family  Concerns, Equipment Needs at Discharge (OT): Pt has reacher at home, denies need for additional AE  Anticipated Discharge Disposition (OT): home with assist, home with home health  Reason for Discharge (OT Discharge Summary): patient discharged from this facility(Anticipate d/c following PT today)  Therapy Frequency (OT Eval): evaluation only  Plan of Care Review  Plan of Care Reviewed With: patient  Plan of Care Reviewed With: patient  Outcome Summary: OT IE completed. No POC established as pt set to d/c home today with support and HH to follow. Pt demonstrates good safety awareness and reports good pain control. SBA all mobility and transfers. Min A LB ADLs. Pt declines any AE or DME needs. Friend to stay 1-2 days and son lives next door. OT to d/c at this time.          Outcome Measures     Row Name 09/11/19 0745             How much help from another is currently needed...    Putting on and taking off regular lower body clothing?  3  -TB      Bathing (including washing, rinsing, and drying)  3  -TB      Toileting (which includes using toilet bed pan or urinal)  4  -TB      Putting on and taking off regular upper body clothing  4  -TB      Taking care of personal grooming (such as brushing teeth)  4  -TB      Eating meals  4  -TB      AM-PAC 6 Clicks Score (OT)  22  -TB         Functional Assessment    Outcome Measure Options  AM-PAC 6 Clicks Daily Activity (OT)  -TB        User Key  (r) = Recorded By, (t) = Taken By, (c) = Cosigned By    Initials Name Provider Type    Evi Leigh OT Occupational Therapist          Time Calculation:   Time Calculation- OT     Row Name 09/11/19 0858 09/11/19 0745          Time Calculation- OT    OT Start Time  0745 -TB  --     OT Received On  09/11/19 -TB  --        Timed Charges    16100 - OT Self Care/Mgmt Minutes  --  20  -TB       User Key  (r) = Recorded By, (t) = Taken By, (c) = Cosigned By    Initials Name Provider Type    Evi Leigh OT  Occupational Therapist        Therapy Suggested Charges     Code   Minutes Charges    93991 (CPT®) Hc Ot Neuromusc Re Education Ea 15 Min      58204 (CPT®) Hc Ot Ther Proc Ea 15 Min      60088 (CPT®) Hc Ot Therapeutic Act Ea 15 Min      00664 (CPT®) Hc Ot Manual Therapy Ea 15 Min      84428 (CPT®) Hc Ot Iontophoresis Ea 15 Min      10545 (CPT®) Hc Ot Elec Stim Ea-Per 15 Min      75092 (CPT®) Hc Ot Ultrasound Ea 15 Min      57327 (CPT®) Hc Ot Self Care/Mgmt/Train Ea 15 Min 20 1    Total  20 1        Therapy Charges for Today     Code Description Service Date Service Provider Modifiers Qty    95752607252 HC OT SELF CARE/MGMT/TRAIN EA 15 MIN 9/11/2019 Evi Conway OT GO 1    05430909096 HC OT EVAL LOW COMPLEXITY 4 9/11/2019 Evi Conway OT GO 1               OT Discharge Summary  Anticipated Discharge Disposition (OT): home with assist, home with home health    Evi Conway OT  9/11/2019

## 2019-09-11 NOTE — PROGRESS NOTES
[facility]    Acute pain service Inpatient Progress Note    Patient Name: Lilia Murcia  :  1945  MRN:  [id number]        Acute Pain  Service Inpatient Progress Note:    Analgesia:Good  Pain Score:2/10  LOC: alert and awake  Resp Status: room air  Cardiac: VS stable  Side Effects:None  Catheter Site:clean, dressing intact and dry  Cath type: peripheral nerve cath with ON Q  Infusion rate: 10ml/hr  Catheter Plan:Catheter to remain Insitu and Continue catheter infusion rate unchanged  Comments: Anterior = 2  Posterior = 5

## 2019-09-11 NOTE — PLAN OF CARE
Problem: Patient Care Overview  Goal: Plan of Care Review  Outcome: Outcome(s) achieved Date Met: 09/11/19 09/11/19 1006   Coping/Psychosocial   Plan of Care Reviewed With patient   Plan of Care Review   Progress improving   OTHER   Outcome Summary Patient ambulated 400 feet with RW and step through gait pattern, limited by pain. Patient climbed 3 steps with SPC and HHA with no difficulty. ROM progressing well, 9-90 degrees. Patient has been d/c home with family and HHPT.        Problem: Knee Arthroplasty (Total, Partial) (Adult)  Goal: Signs and Symptoms of Listed Potential Problems Will be Absent, Minimized or Managed (Knee Arthroplasty)  Outcome: Ongoing (interventions implemented as appropriate)   09/11/19 1006   Goal/Outcome Evaluation   Problems Assessed (Knee Arthroplasty) functional deficit;pain;range of motion decreased   Problems Present (Knee Arthroplasty) functional deficit;pain;range of motion decreased

## 2019-09-11 NOTE — DISCHARGE INSTRUCTIONS
[facility] will provide you with home health. They will contact you to set up your first appointment.     You have an Arrow Pump to assist with pain control. Please contact the phone number provided by anesthesiology for any questions, problems, or concerns regarding your Arrow Pump.     [employer] has provided you with a rolling walker.

## 2019-09-11 NOTE — PROGRESS NOTES
Discharge Planning Assessment  [facility]     Patient Name: Lilia Murcia  MRN: [id number]  Today's Date: 9/11/2019    Admit Date: 9/10/2019    Discharge Needs Assessment     Row Name 09/11/19 1034       Living Environment    Lives With  alone    Current Living Arrangements  home/apartment/condo    Primary Care Provided by  self    Provides Primary Care For  no one    Family Caregiver if Needed  child(deana), adult    Able to Return to Prior Arrangements  yes       Transition Planning    Patient/Family Anticipates Transition to  home    Transportation Anticipated  family or friend will provide       Discharge Needs Assessment    Equipment Currently Used at Home  cane, straight;shower chair        Discharge Plan     Row Name 09/11/19 1042       Plan    Plan  Home w/ [facility]    Patient/Family in Agreement with Plan  yes    Plan Comments  Spoke with patient at bedside. She lives alone in a one story in [facility] she was independent with ADL's and mobility. Per therpy recs she would benefit from PT at [facility] and a RW. Patient and agreeable to both and per her request referrals have been made to [facility] for PT and to [facility] for a Jr. RW. The walker will be delivered to patient today at bedside. No other needs noted at this time. CM following.     Final Discharge Disposition Code  06 - home with home health care        Destination      No service coordination in this encounter.      Durable Medical Equipment      No service coordination in this encounter.      Dialysis/Infusion      No service coordination in this encounter.      Home Medical Care - Selection Complete      Service Provider Request Status Selected Services Address Phone Number Fax Number    [facility] Selected [facility] Health Services [address] [place] [id number] [phone]      Therapy      No service coordination in this encounter.      Community Resources      No service  coordination in this encounter.        Expected Discharge Date and Time     Expected Discharge Date Expected Discharge Time    Sep 11, 2019         Demographic Summary     Row Name 09/11/19 1034       General Information    Arrived From  home    Reason for Consult  discharge planning        Functional Status     Row Name 09/11/19 1034       Functional Status    Usual Activity Tolerance  good    Current Activity Tolerance  moderate        Psychosocial    No documentation.       Abuse/Neglect    No documentation.       Legal    No documentation.       Substance Abuse    No documentation.       Patient Forms    No documentation.           Daija Vazquez RN

## 2019-09-11 NOTE — PROGRESS NOTES
"      [facility] Orthopaedic Surgery Progress Note    Subjective      LOS: 0 days   Patient Care Team:  Alton Jordan DO as PCP - General  Alton Jordan DO as PCP - Family Medicine    Chief complaint: Left knee pain       Interval History:   Patient was resting comfortably in a chair when I saw her this morning.  Pain was controlled, and she would like to go home if possible.    Objective      Vital Signs  Temp (24hrs), Av.8 °F (36.6 °C), Min:97.1 °F (36.2 °C), Max:98.4 °F (36.9 °C)      /57 (BP Location: Left arm, Patient Position: Sitting)   Pulse 72   Temp 98.1 °F (36.7 °C) (Oral)   Resp 18   Ht 154.9 cm (61\")   Wt 75.1 kg (165 lb 9.1 oz)   SpO2 96%   BMI 31.28 kg/m²     Examination:   Examination of the left knee: The wound is clean, dry, and intact.  Ankle dorsiflexion, ankle plantar flexion, and EHL are intact.  Sensation intact in the foot to light touch.  2+ dorsalis pedis pulse.  Straight leg raise is intact.      Labs:  Results from last 7 days   Lab Units 19  0733   WBC 10*3/mm3 15.12*   HEMOGLOBIN g/dL 10.1*   HEMATOCRIT % 32.2*   MCV fL 91.7   PLATELETS 10*3/mm3 242       Radiology:  Imaging Results (last 24 hours)     Procedure Component Value Units Date/Time    XR Knee 1 or 2 View Left [[id number]] Collected:  09/10/19 1328     Updated:  09/10/19 1635    Narrative:       EXAMINATION: XR KNEE 1 OR 2 VW LEFT- 09/10/2019     INDICATION: KNEE ARTHROPLASTY      COMPARISON: 2019     FINDINGS: AP and lateral views of the left knee status post left total  knee arthroplasty without evidence of complication in excellent anatomic  alignment. Expected postsurgical changes in the adjacent soft tissues  including soft tissue emphysema.           Impression:       Status post left total knee arthroplasty without evidence of  complication in excellent anatomic alignment.        D:  09/10/2019  E:  09/10/2019             PT:  Note from yesterday: Patient ambulated 300 feet with RW and " step through gait pattern, limited by pain. ROM to be initiated POD#1. Plan is d/c home with family and HHPT. Progress to stair training in AM to prepare for d/c home. Encouraged patient to ambulate with nursing again tonight. PADD score of 8.      Results Review:     I reviewed the patient's new clinical results.    Assessment and Plan     Assessment:   Status post left total knee arthroplasty-doing well      Status post total left knee replacement    Primary osteoarthritis of left knee    HTN (hypertension)    Prediabetes    Leukocytosis, likely reactive    Acute blood loss anemia, mild, likely reactive    Acute postoperative pain      Plan for disposition: Plan for discharge to home today.  Follow-up with me in 3 weeks as planned.     Provider Department Center   10/2/2019 10:10 AM Jeffrey Sutton MD [facility] ORTHOPEDIC SPORTS [facility]ICINE    Appt Notes: 3 WEEK  POSTOP           Jeffrey Sutton MD  09/11/19  12:55 PM

## 2019-09-12 ENCOUNTER — TELEPHONE (OUTPATIENT)
Dept: ORTHOPEDIC SURGERY | Facility: CLINIC | Age: 74
End: 2019-09-12

## 2019-09-12 NOTE — PROGRESS NOTES
[facility]    Nerve Cath Post Op Call    Patient Name: Lilia Murcia  :  1945  MRN:  [id number]  Date of Discharge: 2019    Nerve Cath Post Op Call:    Analgesia:Good  Catheter Plan:Patient/Family member report nerve catheter previously discontinued, tip intact

## 2019-09-12 NOTE — TELEPHONE ENCOUNTER
[facility]- [facility] Nurse - asking for verbal orders for Pt on behalf of patient.  Please call her cell:  [phone]

## 2019-09-12 NOTE — TELEPHONE ENCOUNTER
Called [facility] back-   Admitted patient today for HHPT after (L) TKA. Gave verbal orders for them to continue HHPT.     Charley

## 2019-09-24 ENCOUNTER — TELEPHONE (OUTPATIENT)
Dept: ORTHOPEDIC SURGERY | Facility: CLINIC | Age: 74
End: 2019-09-24

## 2019-09-24 NOTE — TELEPHONE ENCOUNTER
COMING IN ON Tuesday 10/1/19, HAD SURGERY TWO WEEKS AGO, HAS A SPOT ON HEAD THAT THE DERMATOLOGIST WANTS TO REMOVE NEXT Friday, IS IT OK FOR THEM TO GO AHEAD AND DO? [phone]

## 2019-09-25 NOTE — TELEPHONE ENCOUNTER
I spoke with Ms. Murcia and she said that she has a spot that has cancerous cells on her forehead.  She said that it is not anything that needs immediate attention.  She can postpone the surgery if she needs to.  Her surgery is set for 10/4/19.  Should she postpone and if so how long should she wait before she has it?  Please advise.  Thanks. Tanya

## 2019-09-25 NOTE — TELEPHONE ENCOUNTER
PATIENT CALLED AGAIN ASKING ABOUT THIS, SAID IT DOESN'T HAVE TO BE DONE RIGHT AWAY, CAN POSTPONE IT IF NEEDED

## 2019-09-26 NOTE — TELEPHONE ENCOUNTER
I called and let her know that [facility] would like for her to wait at least 2 months from surgery.  She understood.   Tanya

## 2019-10-02 ENCOUNTER — OFFICE VISIT (OUTPATIENT)
Dept: ORTHOPEDIC SURGERY | Facility: CLINIC | Age: 74
End: 2019-10-02

## 2019-10-02 DIAGNOSIS — Z96.652 S/P TOTAL KNEE ARTHROPLASTY, LEFT: Primary | ICD-10-CM

## 2019-10-02 DIAGNOSIS — Z47.89 ORTHOPEDIC AFTERCARE: ICD-10-CM

## 2019-10-02 PROCEDURE — 99024 POSTOP FOLLOW-UP VISIT: CPT | Performed by: ORTHOPAEDIC SURGERY

## 2019-10-02 NOTE — PROGRESS NOTES
[facility] [place] Orthopaedic Surgery Clinic Note    Subjective     Chief Complaint   Patient presents with   • Post-op     3 week S/P TOTAL KNEE LEFT ARTHROPLASTY 09/10/19        HPI    Lilia Murcia is a 74 y.o. female.  She follows up today for her left total knee arthroplasty.  She is doing well today, with 100% relief compared to preoperative symptoms.  She is ambulatory without external aids.      Patient Active Problem List   Diagnosis   • Primary osteoarthritis of left knee   • Primary osteoarthritis of right knee   • Status post total left knee replacement   • HTN (hypertension)   • Prediabetes   • Leukocytosis, likely reactive   • Acute blood loss anemia, mild, likely reactive   • Acute postoperative pain     Past Medical History:   Diagnosis Date   • Arthritis    • Asthma    • GERD (gastroesophageal reflux disease)    • Hiatal hernia     still present    • Hypertension    • Skin cancer     basal cell still present on top of head   • Wears glasses       Past Surgical History:   Procedure Laterality Date   • BREAST EXCISIONAL BIOPSY Right 11/12/2013   • CATARACT EXTRACTION      bilat    • CHOLECYSTECTOMY     • COLONOSCOPY     • ENDOSCOPY     • HYSTERECTOMY      total    • INJECTION OF MEDICATION      x5 bilat knee cortisone    • OOPHORECTOMY     • TOTAL KNEE ARTHROPLASTY Left 9/10/2019    Procedure: TOTAL KNEE LEFT ARTHROPLASTY;  Surgeon: Jeffrey Sutton MD;  Location: [facility];  Service: Orthopedics      Family History   Problem Relation Age of Onset   • Breast cancer Neg Hx    • Ovarian cancer Neg Hx      Social History     Socioeconomic History   • Marital status:      Spouse name: Not on file   • Number of children: Not on file   • Years of education: Not on file   • Highest education level: Not on file   Tobacco Use   • Smoking status: Never Smoker   • Smokeless tobacco: Never Used   Substance and Sexual Activity   • Alcohol use: No   • Drug use: No   • Sexual activity: Defer      Current Outpatient  Medications on File Prior to Visit   Medication Sig Dispense Refill   • albuterol sulfate  (90 Base) MCG/ACT inhaler Inhale 2 puffs Every 6 (Six) Hours As Needed for Wheezing.     • aspirin 81 MG chewable tablet Chew 1 tablet Daily. Resume in 1 month     • aspirin  MG tablet Take 1 tablet by mouth Daily. 30 tablet 0   • Cholecalciferol (VITAMIN D PO) Take 2,000 mg by mouth Daily.     • docusate sodium 100 MG capsule Take 100 mg by mouth 2 (Two) Times a Day As Needed for Constipation. 60 each 0   • hydrochlorothiazide (HYDRODIURIL) 25 MG tablet Take 25 mg by mouth Daily.     • omeprazole (priLOSEC) 20 MG capsule Take 20 mg by mouth Daily As Needed.     • quinapril (ACCUPRIL) 40 MG tablet Take 40 mg by mouth Daily.       No current facility-administered medications on file prior to visit.       Allergies   Allergen Reactions   • Sulfa Antibiotics Rash and Unknown (See Comments)     May or may not be a reaction. Has been years since a reaction.         Review of Systems   Constitutional: Negative.  Negative for activity change, appetite change, chills, diaphoresis, fatigue, fever and unexpected weight change.   HENT: Negative.  Negative for congestion, dental problem, drooling, ear discharge, ear pain, facial swelling, hearing loss, mouth sores, nosebleeds, postnasal drip, rhinorrhea, sinus pressure, sneezing, sore throat, tinnitus, trouble swallowing and voice change.    Eyes: Negative.  Negative for photophobia, pain, discharge, redness, itching and visual disturbance.   Respiratory: Negative.  Negative for apnea, cough, choking, chest tightness, shortness of breath, wheezing and stridor.    Cardiovascular: Negative.  Negative for chest pain, palpitations and leg swelling.   Gastrointestinal: Negative.  Negative for abdominal distention, abdominal pain, anal bleeding, blood in stool, constipation, diarrhea, nausea, rectal pain and vomiting.   Endocrine: Negative.  Negative for cold intolerance, heat  intolerance, polydipsia, polyphagia and polyuria.   Genitourinary: Negative.  Negative for decreased urine volume, difficulty urinating, dysuria, enuresis, flank pain, frequency, genital sores, hematuria and urgency.   Musculoskeletal: Positive for arthralgias. Negative for back pain, gait problem, joint swelling, myalgias, neck pain and neck stiffness.   Skin: Negative.  Negative for color change, pallor, rash and wound.   Allergic/Immunologic: Negative.  Negative for environmental allergies, food allergies and immunocompromised state.   Neurological: Negative.  Negative for dizziness, tremors, seizures, syncope, facial asymmetry, speech difficulty, weakness, light-headedness, numbness and headaches.   Hematological: Negative.  Negative for adenopathy. Does not bruise/bleed easily.   Psychiatric/Behavioral: Negative.  Negative for agitation, behavioral problems, confusion, decreased concentration, dysphoric mood, hallucinations, self-injury, sleep disturbance and suicidal ideas. The patient is not nervous/anxious and is not hyperactive.         Objective      Physical Exam  There were no vitals taken for this visit.    There is no height or weight on file to calculate BMI.    General:   Mental Status:  Alert   Appearance: Cooperative, in no acute distress   Build and Nutrition: Well-nourished well-developed female   Orientation: Alert and oriented to person, place and time   Posture: Normal   Gait: Normal    Integument:   Left knee: Wound is well-healed with no signs of infection    Lower Extremities:   Left Knee:    Tenderness:  None    Effusion:  None    Swelling: None    Crepitus:  None    Range of motion:  Extension: 0°       Flexion: 120°  Instability:  No varus laxity, no valgus laxity, negative anterior drawer  Deformities:  None      Imaging/Studies  Imaging Results (last 24 hours)     Procedure Component Value Units Date/Time    XR Knee 3+ View With [place] Left [[id number]] Resulted:  10/02/19 1018      Updated:  10/02/19 1019    Narrative:       Left Knee Radiographs  Indication: status-post left total knee arthroplasty  Views: AP, lateral, and sunrise views of the left knee    Comparison: no change compared to prior study, 9/10/2019    Findings:   The components are well aligned, with no signs of loosening or failure.            Assessment and Plan     Lilia was seen today for post-op.    Diagnoses and all orders for this visit:    S/P total knee arthroplasty, left  -     XR Knee 3+ View With [place] Left  -     Ambulatory Referral to Physical Therapy Evaluate and treat    Orthopedic aftercare        1. S/P total knee arthroplasty, left    2. Orthopedic aftercare        I reviewed my findings with patient today.  Her left total knee arthroplasty is functioning well, and she is pleased with results of far.  She will continue with her rehabilitation, and I will see her back in 6 weeks for recheck, but no x-rays on that visit are required.  I will see her back sooner for any problems.    Return in about 6 weeks (around 11/13/2019).      Medical Decision Making  Management Options : physical/occupational therapy  Data/Risk: radiology tests and independent visualization of imaging, lab tests, or EMG/NCV      Jeffrey Sutton MD  10/02/19  10:32 AM

## 2019-10-11 ENCOUNTER — TELEPHONE (OUTPATIENT)
Dept: ORTHOPEDIC SURGERY | Facility: CLINIC | Age: 74
End: 2019-10-11

## 2019-10-11 NOTE — TELEPHONE ENCOUNTER
PATIENT CALLED REGARDING A RASH FROM HER BANDAGE. SHE HAD KNEE SURGERY ON 9/10/19. THE PHYSICAL THERAPIST RECOMMENDED SHE CALL US. SHE HAS SMALL BUMPS AND THEY WONT GO AWAY.THE WOUND IS ITCHY BUT NO PAIN. SHE HAS TRIED BENADRYL AND VASOLINE WITH NO CHANGE. SHE CAN BE REACHED [phone].

## 2019-10-14 ENCOUNTER — OFFICE VISIT (OUTPATIENT)
Dept: ORTHOPEDIC SURGERY | Facility: CLINIC | Age: 74
End: 2019-10-14

## 2019-10-14 DIAGNOSIS — T49.95XA SKIN IRRITATION DUE TO TOPICAL AGENT: Primary | ICD-10-CM

## 2019-10-14 DIAGNOSIS — R23.8 SKIN IRRITATION DUE TO TOPICAL AGENT: Primary | ICD-10-CM

## 2019-10-14 DIAGNOSIS — Z96.652 S/P TOTAL KNEE ARTHROPLASTY, LEFT: ICD-10-CM

## 2019-10-14 PROCEDURE — 99024 POSTOP FOLLOW-UP VISIT: CPT | Performed by: PHYSICIAN ASSISTANT

## 2019-10-14 RX ORDER — METHYLPREDNISOLONE 4 MG/1
TABLET ORAL
Qty: 21 TABLET | Refills: 0 | Status: SHIPPED | OUTPATIENT
Start: 2019-10-14 | End: 2020-12-28

## 2019-10-14 NOTE — PROGRESS NOTES
[facility] [place] Orthopaedic Surgery Clinic Note    Subjective     Post-op (5 weeks status post Left Total Knee Arthroplasty 09/10/2019)       HPI    Lilia Murcia is a 74 y.o. female.  Patient presents for follow-up of her left knee following TKA performed on 9/10/2019 by Dr. Sutton.  She last saw Dr. Sutton 10-19 and was doing well until the Prineo dressing was removed.  Afterwards she was using Vaseline to help get the residue from the Prineo dressing off the surrounding incision when she she started noticing little red bumps.  No drainage, warmth drom the incision.  Patient used hydrocortisone cream over the weekend with slight improvement surrounding the incision however the little red bumps have gone up to mid thigh.  She reports significant itching to go along with this rash.  No reported fever, chills, night sweats or other constitutional symptoms.  Patient has not changed soaps, detergents.  She reports that she is very diligent about keeping the surgical site clean.    With regards to the TKA he is having no pain.  She states she still feels 100% relief when compared to her preoperative state.      Objective      Physical Exam  There were no vitals taken for this visit.    There is no height or weight on file to calculate BMI.        Ortho Exam  Left knee  Skin: Surgical incision site is healing well without warmth or drainage.  No evidence of infection.  Positive surrounding erythema (in area of prior Prineo) with tiny erythema papules extending to the anterior thigh area.  Tenderness: None  Motion: 0-120      Imaging Reviewed:  Imaging Results (last 24 hours)     ** No results found for the last 24 hours. **      No new imaging today.      Assessment:  1. Skin irritation due to topical agent    2. S/P total knee arthroplasty, left        Plan:  1. Skin irritation secondary to Prineo in patient that is status post TKA.  2. Ordered Medrol Dosepak.  3. Patient may continue with hydrocortisone cream surrounding the  incision.  4. Continuing following her postoperative rehab protocol.  5. Follow-up with Dr. Sutton on 11/13/2019 as previously directed.  Contact the clinic if other issues arise before this appointment.  6. Questions and concerns answered.      Johana Jenkins PA-C  10/14/19  3:04 PM

## 2019-11-13 ENCOUNTER — OFFICE VISIT (OUTPATIENT)
Dept: ORTHOPEDIC SURGERY | Facility: CLINIC | Age: 74
End: 2019-11-13

## 2019-11-13 DIAGNOSIS — Z47.89 ORTHOPEDIC AFTERCARE: ICD-10-CM

## 2019-11-13 DIAGNOSIS — Z96.652 S/P TOTAL KNEE ARTHROPLASTY, LEFT: Primary | ICD-10-CM

## 2019-11-13 PROCEDURE — 99024 POSTOP FOLLOW-UP VISIT: CPT | Performed by: ORTHOPAEDIC SURGERY

## 2019-11-13 NOTE — PROGRESS NOTES
[facility] [place] [place] [facility] [place]    Subjective     Chief Complaint   Patient presents with   • Post-op     4 week f/u- 9 weeks status post Left Total Knee Arthroplasty 09/10/2019        HPI    Lilia Murcia is a 74 y.o. female.  She follows up today for left total knee arthroplasty.  She is doing well today, with 100% relief compared to preoperative symptoms.  Fully ambulatory without external aids.      Patient Active Problem List   Diagnosis   • Primary osteoarthritis of left knee   • Primary osteoarthritis of right knee   • Status post total left knee replacement   • HTN (hypertension)   • Prediabetes   • Leukocytosis, likely reactive   • Acute blood loss anemia, mild, likely reactive   • Acute postoperative pain     Past Medical History:   Diagnosis Date   • Arthritis    • Asthma    • GERD (gastroesophageal reflux disease)    • Hiatal hernia     still present    • Hypertension    • Skin cancer     basal cell still present on top of head   • Wears glasses       Past Surgical History:   Procedure Laterality Date   • BREAST EXCISIONAL BIOPSY Right 11/12/2013   • CATARACT EXTRACTION      bilat    • CHOLECYSTECTOMY     • COLONOSCOPY     • ENDOSCOPY     • HYSTERECTOMY      total    • INJECTION OF MEDICATION      x5 bilat knee cortisone    • OOPHORECTOMY     • TOTAL KNEE ARTHROPLASTY Left 9/10/2019    Procedure: TOTAL KNEE LEFT ARTHROPLASTY;  Surgeon: Jeffrey Sutton MD;  Location: [facility];  Service: Orthopedics      Family History   Problem Relation Age of Onset   • Breast cancer Neg Hx    • Ovarian cancer Neg Hx      Social History     Socioeconomic History   • Marital status:      Spouse name: Not on file   • Number of children: Not on file   • Years of education: Not on file   • Highest education level: Not on file   Tobacco Use   • Smoking status: Never Smoker   • Smokeless tobacco: Never Used   Substance and Sexual Activity   • Alcohol use: No   • Drug use: No   • Sexual activity: Defer       Current Outpatient Medications on File Prior to Visit   Medication Sig Dispense Refill   • albuterol sulfate  (90 Base) MCG/ACT inhaler Inhale 2 puffs Every 6 (Six) Hours As Needed for Wheezing.     • aspirin 81 MG chewable tablet Chew 1 tablet Daily. Resume in 1 month     • aspirin  MG tablet Take 1 tablet by mouth Daily. 30 tablet 0   • Cholecalciferol (VITAMIN D PO) Take 2,000 mg by mouth Daily.     • docusate sodium 100 MG capsule Take 100 mg by mouth 2 (Two) Times a Day As Needed for Constipation. 60 each 0   • hydrochlorothiazide (HYDRODIURIL) 25 MG tablet Take 25 mg by mouth Daily.     • methylPREDNISolone (MEDROL, ROBBIE,) 4 MG tablet Use as directed by package instructions 21 tablet 0   • omeprazole (priLOSEC) 20 MG capsule Take 20 mg by mouth Daily As Needed.     • quinapril (ACCUPRIL) 40 MG tablet Take 40 mg by mouth Daily.       No current facility-administered medications on file prior to visit.       Allergies   Allergen Reactions   • Sulfa Antibiotics Rash and Unknown (See Comments)     May or may not be a reaction. Has been years since a reaction.         Review of Systems   Constitutional: Negative for activity change, appetite change, chills, diaphoresis, fatigue, fever and unexpected weight change.   HENT: Negative for congestion, dental problem, drooling, ear discharge, ear pain, facial swelling, hearing loss, mouth sores, nosebleeds, postnasal drip, rhinorrhea, sinus pressure, sneezing, sore throat, tinnitus, trouble swallowing and voice change.    Eyes: Negative for photophobia, pain, discharge, redness, itching and visual disturbance.   Respiratory: Negative for apnea, cough, choking, chest tightness, shortness of breath, wheezing and stridor.    Cardiovascular: Negative for chest pain, palpitations and leg swelling.   Gastrointestinal: Negative for abdominal distention, abdominal pain, anal bleeding, blood in stool, constipation, diarrhea, nausea, rectal pain and vomiting.    Endocrine: Negative for cold intolerance, heat intolerance, polydipsia, polyphagia and polyuria.   Genitourinary: Negative for decreased urine volume, difficulty urinating, dysuria, enuresis, flank pain, frequency, genital sores, hematuria and urgency.   Musculoskeletal: Positive for arthralgias. Negative for back pain, gait problem, joint swelling, myalgias, neck pain and neck stiffness.   Skin: Negative for color change, pallor, rash and wound.   Allergic/Immunologic: Negative for environmental allergies, food allergies and immunocompromised state.   Neurological: Negative for dizziness, tremors, seizures, syncope, facial asymmetry, speech difficulty, weakness, light-headedness, numbness and headaches.   Hematological: Negative for adenopathy. Does not bruise/bleed easily.   Psychiatric/Behavioral: Negative for agitation, behavioral problems, confusion, decreased concentration, dysphoric mood, hallucinations, self-injury, sleep disturbance and suicidal ideas. The patient is not nervous/anxious and is not hyperactive.         Objective      Physical Exam  There were no vitals taken for this visit.    There is no height or weight on file to calculate BMI.    General:   Mental Status:  Alert   Appearance: Cooperative, in no acute distress   Build and Nutrition: Well-nourished well-developed female   Orientation: Alert and oriented to person, place and time   Posture: Normal   Gait: Normal    Integument:   Left knee: Wound is well-healed with no signs of infection    Lower Extremities:   Left Knee:    Tenderness:  None    Effusion:  None    Swelling: None    Crepitus:  None    Range of motion:  Extension: 0°       Flexion: 140°  Instability:  No varus laxity, no valgus laxity, negative anterior drawer  Deformities:  None        Assessment and Plan     Lilia was seen today for post-op.    Diagnoses and all orders for this visit:    S/P total knee arthroplasty, left    Orthopedic aftercare        1. S/P total knee  arthroplasty, left    2. Orthopedic aftercare        I reviewed my findings with the patient today.  Her left total knee arthroplasty is functioning well, and she is pleased with results.  I will see her back in 10 months, which will be a 1 year checkup, but sooner for any problems.    Return in about 10 months (around 9/13/2020).          Jeffrey Sutton MD  11/13/19  3:28 PM

## 2019-12-10 ENCOUNTER — TRANSCRIBE ORDERS (OUTPATIENT)
Dept: ADMINISTRATIVE | Facility: HOSPITAL | Age: 74
End: 2019-12-10

## 2019-12-10 DIAGNOSIS — Z12.31 VISIT FOR SCREENING MAMMOGRAM: Primary | ICD-10-CM

## 2020-02-07 ENCOUNTER — TRANSCRIBE ORDERS (OUTPATIENT)
Dept: ADMINISTRATIVE | Facility: HOSPITAL | Age: 75
End: 2020-02-07

## 2020-02-07 DIAGNOSIS — Z78.0 POSTMENOPAUSAL: Primary | ICD-10-CM

## 2020-02-21 ENCOUNTER — HOSPITAL ENCOUNTER (OUTPATIENT)
Dept: MAMMOGRAPHY | Facility: HOSPITAL | Age: 75
Discharge: HOME | End: 2020-02-21
Admitting: FAMILY MEDICINE

## 2020-02-21 DIAGNOSIS — Z12.31 VISIT FOR SCREENING MAMMOGRAM: ICD-10-CM

## 2020-02-21 PROCEDURE — 77063 BREAST TOMOSYNTHESIS BI: CPT | Performed by: RADIOLOGY

## 2020-02-21 PROCEDURE — 77063 BREAST TOMOSYNTHESIS BI: CPT

## 2020-02-21 PROCEDURE — 77067 SCR MAMMO BI INCL CAD: CPT

## 2020-02-21 PROCEDURE — 77067 SCR MAMMO BI INCL CAD: CPT | Performed by: RADIOLOGY

## 2020-03-04 ENCOUNTER — TELEPHONE (OUTPATIENT)
Dept: ORTHOPEDIC SURGERY | Facility: CLINIC | Age: 75
End: 2020-03-04

## 2020-03-04 RX ORDER — CEPHALEXIN 500 MG/1
CAPSULE ORAL
Qty: 4 CAPSULE | Refills: 0 | Status: SHIPPED | OUTPATIENT
Start: 2020-03-04 | End: 2020-12-28

## 2020-03-04 NOTE — TELEPHONE ENCOUNTER
Spoke with the patient and advised that the medication has been sent to the pharmacy. She understood.    Carlyn

## 2020-03-26 ENCOUNTER — HOSPITAL ENCOUNTER (OUTPATIENT)
Dept: ULTRASOUND IMAGING | Facility: HOSPITAL | Age: 75
Discharge: HOME | End: 2020-03-26

## 2020-03-26 ENCOUNTER — HOSPITAL ENCOUNTER (OUTPATIENT)
Dept: MAMMOGRAPHY | Facility: HOSPITAL | Age: 75
Discharge: HOME | End: 2020-03-26
Admitting: RADIOLOGY

## 2020-03-26 DIAGNOSIS — R92.8 ABNORMAL MAMMOGRAM: ICD-10-CM

## 2020-03-26 PROCEDURE — G0279 TOMOSYNTHESIS, MAMMO: HCPCS | Performed by: RADIOLOGY

## 2020-03-26 PROCEDURE — 77065 DX MAMMO INCL CAD UNI: CPT | Performed by: RADIOLOGY

## 2020-03-26 PROCEDURE — 76642 ULTRASOUND BREAST LIMITED: CPT

## 2020-03-26 PROCEDURE — 76642 ULTRASOUND BREAST LIMITED: CPT | Performed by: RADIOLOGY

## 2020-03-26 PROCEDURE — G0279 TOMOSYNTHESIS, MAMMO: HCPCS

## 2020-03-26 PROCEDURE — 77065 DX MAMMO INCL CAD UNI: CPT

## 2020-06-08 ENCOUNTER — HOSPITAL ENCOUNTER (OUTPATIENT)
Dept: BONE DENSITY | Facility: HOSPITAL | Age: 75
Discharge: HOME | End: 2020-06-08
Admitting: FAMILY MEDICINE

## 2020-06-08 DIAGNOSIS — Z78.0 POSTMENOPAUSAL: ICD-10-CM

## 2020-06-08 PROCEDURE — 77080 DXA BONE DENSITY AXIAL: CPT

## 2020-09-09 ENCOUNTER — OFFICE VISIT (OUTPATIENT)
Dept: ORTHOPEDIC SURGERY | Facility: CLINIC | Age: 75
End: 2020-09-09

## 2020-09-09 VITALS — HEART RATE: 100 BPM | BODY MASS INDEX: 29.91 KG/M2 | HEIGHT: 61 IN | WEIGHT: 158.4 LBS | OXYGEN SATURATION: 98 %

## 2020-09-09 DIAGNOSIS — Z09 POSTOPERATIVE EXAMINATION: ICD-10-CM

## 2020-09-09 DIAGNOSIS — Z96.652 S/P TOTAL KNEE ARTHROPLASTY, LEFT: Primary | ICD-10-CM

## 2020-09-09 DIAGNOSIS — M17.11 PRIMARY OSTEOARTHRITIS OF RIGHT KNEE: ICD-10-CM

## 2020-09-09 PROCEDURE — 99214 OFFICE O/P EST MOD 30 MIN: CPT | Performed by: ORTHOPAEDIC SURGERY

## 2020-09-09 ASSESSMENT — KOOS JR
KOOS JR SCORE: 100
KOOS JR SCORE: 0

## 2020-09-09 NOTE — PROGRESS NOTES
"    [facility] [place] [facility] [place]    Subjective     Chief Complaint   Patient presents with   • Right Knee - Pain   • Follow-up     1 year follow up status post Left Total Knee Arthroplasty 09/10/2019        HPI    Lilia Murcia is a 75 y.o. female who presents with right knee pain.  Onset: atraumatic and gradual in nature. The issue has been ongoing for 1 year(s). Pain is a {0-10:18212}/10 on the pain scale. Pain is described as {Mateusz Pain Characterization:76133}. Associated symptoms include {Mateusz Symptoms:90470}. The pain is worse with {Movement:43606}; {Home Tx:75471} improve the pain. Previous treatments have included: {Previous Tx:13879}. ***    I have reviewed the following portions of the patient's history and agree with: {Herman HPI and ROS:63920::\"History of Present Illness\",\"Review of Systems\"}    Patient Active Problem List   Diagnosis   • Primary osteoarthritis of left knee   • Primary osteoarthritis of right knee   • Status post total left knee replacement   • HTN (hypertension)   • Prediabetes   • Leukocytosis, likely reactive   • Acute blood loss anemia, mild, likely reactive   • Acute postoperative pain     Past Medical History:   Diagnosis Date   • Arthritis    • Asthma    • GERD (gastroesophageal reflux disease)    • Hiatal hernia     still present    • Hypertension    • Skin cancer     basal cell still present on top of head   • Wears glasses       Past Surgical History:   Procedure Laterality Date   • BREAST EXCISIONAL BIOPSY Right 11/12/2013   • CATARACT EXTRACTION      bilat    • CHOLECYSTECTOMY     • COLONOSCOPY     • ENDOSCOPY     • HYSTERECTOMY      total    • INJECTION OF MEDICATION      x5 bilat knee cortisone    • OOPHORECTOMY     • TOTAL KNEE ARTHROPLASTY Left 9/10/2019    Procedure: TOTAL KNEE LEFT ARTHROPLASTY;  Surgeon: Jeffrey Sutton MD;  Location: [facility];  Service: Orthopedics      Family History   Problem Relation Age of Onset   • Breast cancer Neg Hx    • " "Ovarian cancer Neg Hx      Social History     Socioeconomic History   • Marital status:      Spouse name: Not on file   • Number of children: Not on file   • Years of education: Not on file   • Highest education level: Not on file   Tobacco Use   • Smoking status: Never Smoker   • Smokeless tobacco: Never Used   Substance and Sexual Activity   • Alcohol use: No   • Drug use: No   • Sexual activity: Defer      Current Outpatient Medications on File Prior to Visit   Medication Sig Dispense Refill   • albuterol sulfate  (90 Base) MCG/ACT inhaler Inhale 2 puffs Every 6 (Six) Hours As Needed for Wheezing.     • aspirin 81 MG chewable tablet Chew 1 tablet Daily. Resume in 1 month     • cephalexin (KEFLEX) 500 MG capsule Take 4 caps po 1 hour prior to procedure 4 capsule 0   • Cholecalciferol (VITAMIN D PO) Take 2,000 mg by mouth Daily.     • hydrochlorothiazide (HYDRODIURIL) 25 MG tablet Take 25 mg by mouth Daily.     • methylPREDNISolone (MEDROL, ROBBIE,) 4 MG tablet Use as directed by package instructions 21 tablet 0   • omeprazole (priLOSEC) 20 MG capsule Take 20 mg by mouth Daily As Needed.     • quinapril (ACCUPRIL) 40 MG tablet Take 40 mg by mouth Daily.     • [DISCONTINUED] aspirin  MG tablet Take 1 tablet by mouth Daily. 30 tablet 0   • [DISCONTINUED] docusate sodium 100 MG capsule Take 100 mg by mouth 2 (Two) Times a Day As Needed for Constipation. 60 each 0     No current facility-administered medications on file prior to visit.       Allergies   Allergen Reactions   • Sulfa Antibiotics Rash and Unknown (See Comments)     May or may not be a reaction. Has been years since a reaction.         Review of Systems     Objective      Physical Exam  Pulse 100   Ht 154.9 cm (60.98\")   Wt 71.8 kg (158 lb 6.4 oz)   SpO2 98%   BMI 29.95 kg/m²     Body mass index is 29.95 kg/m².    General:   Mental Status:  Alert   Appearance: Cooperative, in no acute distress   Build and Nutrition: *** " female   Orientation: Alert and oriented to person, place and time   Posture: Normal   Gait: Normal    ***    Imaging/Studies      Imaging Results (Last 24 Hours)     Procedure Component Value Units Date/Time    XR Knee 3+ View With [place] Left [[id number]] Resulted:  09/09/20 1541     Updated:  09/09/20 1541    Narrative:       Left Knee Radiographs  Indication: status-post left total knee arthroplasty  Views: AP, lateral, and sunrise views of the left knee    Comparison: no change compared to prior study, 10/2/2019    Findings:   The components are well aligned, with no signs of loosening or failure.    XR Knee 4+ View Right [[id number]] Resulted:  09/09/20 1540     Updated:  09/09/20 1541    Narrative:       Right Knee Radiographs  Indication: right knee pain  Views: Standing AP's and skiers of both knees, with lateral and sunrise   views of the right knee    Comparison: 8/30/2019    Findings:   Bone-on-bone contact medial compartment, varus alignment, tricompartmental   osteophytes, advanced patellofemoral degeneration, with no acute bony   abnormalities.  No significant worsening compared to previous films.          Assessment and Plan     Lilia was seen today for follow-up and pain.    Diagnoses and all orders for this visit:    S/P total knee arthroplasty, left  -     XR Knee 3+ View With [place] Left    Primary osteoarthritis of right knee  -     XR Knee 4+ View Right        1. S/P total knee arthroplasty, left    2. Primary osteoarthritis of right knee        ***    No follow-ups on file.    Medical Decision Making  Management Options : {[facility] - Management Options:24259}  Data/Risk: {[facility] - Data/Risk:10129}      ORLANDO Fuentes(R)  09/09/20  15:45    Dragon disclaimer:  Much of this encounter note is an electronic transcription/translation of spoken language to printed text. The electronic translation of spoken language may permit erroneous, or at times, nonsensical words or phrases to be inadvertently  transcribed; Although I have reviewed the note for such errors, some may still exist.

## 2020-09-09 NOTE — PROGRESS NOTES
[facility] [place]    Subjective     Chief Complaint   Patient presents with   • Right Knee - Pain   • Follow-up     1 year follow up status post Left Total Knee Arthroplasty 09/10/2019        HPI    Lilia Murcia is a 75 y.o. female who presents with new problem of: right knee pain.  Onset: atraumatic and gradual in nature. The issue has been ongoing for 1 year(s). Pain is a 5/10 on the pain scale. Pain is described as dull and aching. Associated symptoms include pain and popping. The pain is worse with walking, standing and working; resting improve the pain. Previous treatments have included: NSAIDS and physical therapy.  She would like to consider knee replacement surgery at this time.  She has exhausted conservative treatment options.  No history of clots or clotting disorders.    It has been 10  month(s) since Ms. Murcia's last visit. She returns to clinic today for follow-up of left knee arthroplasty. She rates her pain a 0/10 on the pain scale. Previous/current treatments: nothing.  100% improvement compared to her preoperative symptoms.  She is pleased with results.    I have reviewed the following portions of the patient's history and agree with: History of Present Illness and Review of Systems         Patient Active Problem List   Diagnosis   • Primary osteoarthritis of left knee   • Primary osteoarthritis of right knee   • Status post total left knee replacement   • HTN (hypertension)   • Prediabetes   • Leukocytosis, likely reactive   • Acute blood loss anemia, mild, likely reactive   • Acute postoperative pain     Past Medical History:   Diagnosis Date   • Arthritis    • Asthma    • GERD (gastroesophageal reflux disease)    • Hiatal hernia     still present    • Hypertension    • Skin cancer     basal cell still present on top of head   • Wears glasses       Past Surgical History:   Procedure Laterality Date   • BREAST EXCISIONAL BIOPSY Right 11/12/2013   • CATARACT EXTRACTION       bilat    • CHOLECYSTECTOMY     • COLONOSCOPY     • ENDOSCOPY     • HYSTERECTOMY      total    • INJECTION OF MEDICATION      x5 bilat knee cortisone    • OOPHORECTOMY     • TOTAL KNEE ARTHROPLASTY Left 9/10/2019    Procedure: TOTAL KNEE LEFT ARTHROPLASTY;  Surgeon: Jeffrey Sutton MD;  Location: [facility];  Service: Orthopedics      Family History   Problem Relation Age of Onset   • Breast cancer Neg Hx    • Ovarian cancer Neg Hx      Social History     Socioeconomic History   • Marital status:      Spouse name: Not on file   • Number of children: Not on file   • Years of education: Not on file   • Highest education level: Not on file   Tobacco Use   • Smoking status: Never Smoker   • Smokeless tobacco: Never Used   Substance and Sexual Activity   • Alcohol use: No   • Drug use: No   • Sexual activity: Defer      Current Outpatient Medications on File Prior to Visit   Medication Sig Dispense Refill   • albuterol sulfate  (90 Base) MCG/ACT inhaler Inhale 2 puffs Every 6 (Six) Hours As Needed for Wheezing.     • aspirin 81 MG chewable tablet Chew 1 tablet Daily. Resume in 1 month     • cephalexin (KEFLEX) 500 MG capsule Take 4 caps po 1 hour prior to procedure 4 capsule 0   • Cholecalciferol (VITAMIN D PO) Take 2,000 mg by mouth Daily.     • hydrochlorothiazide (HYDRODIURIL) 25 MG tablet Take 25 mg by mouth Daily.     • methylPREDNISolone (MEDROL, ROBBIE,) 4 MG tablet Use as directed by package instructions 21 tablet 0   • omeprazole (priLOSEC) 20 MG capsule Take 20 mg by mouth Daily As Needed.     • quinapril (ACCUPRIL) 40 MG tablet Take 40 mg by mouth Daily.     • [DISCONTINUED] aspirin  MG tablet Take 1 tablet by mouth Daily. 30 tablet 0   • [DISCONTINUED] docusate sodium 100 MG capsule Take 100 mg by mouth 2 (Two) Times a Day As Needed for Constipation. 60 each 0     No current facility-administered medications on file prior to visit.       Allergies   Allergen Reactions   • Sulfa Antibiotics  "Rash and Unknown (See Comments)     May or may not be a reaction. Has been years since a reaction.         Review of Systems   Constitutional: Negative.    HENT: Negative.    Eyes: Negative.    Respiratory: Negative.    Cardiovascular: Negative.    Gastrointestinal: Negative.    Endocrine: Negative.    Genitourinary: Negative.    Musculoskeletal: Positive for arthralgias.   Skin: Negative.    Allergic/Immunologic: Negative.    Neurological: Negative.    Hematological: Negative.    Psychiatric/Behavioral: Negative.         Objective      Physical Exam  Pulse 100   Ht 154.9 cm (60.98\")   Wt 71.8 kg (158 lb 6.4 oz)   SpO2 98%   BMI 29.95 kg/m²     Body mass index is 29.95 kg/m².    General:   Mental Status:  Alert   Appearance: Cooperative, in no acute distress   Build and Nutrition: Well-nourished well-developed female   Orientation: Alert and oriented to person, place and time   Posture: Normal   Gait: Normal    Integument:   Right knee: no skin lesions, no rash, no ecchymosis   Left knee: Incision well-healed, with no signs of infection    Lower Extremities:   Right Knee:    Tenderness:  Medial and lateral joint line tenderness    Effusion:  None    Swelling:  None    Crepitus:  Positive    Atrophy:  None    Range of motion:  Extension: 0°       Flexion: 120°  Instability:  No varus laxity, no valgus laxity, negative anterior drawer  Deformities:  Varus   Left Knee:    Tenderness:  None    Effusion:  None    Swelling:  None    Crepitus: None    Atrophy:  None    Range of motion:  Extension: 0°       Flexion: 130°  Instability:  No varus laxity, no valgus laxity, negative anterior drawer  Deformities:  None      Imaging/Studies  Imaging Results (Last 24 Hours)     Procedure Component Value Units Date/Time    XR Knee 3+ View With [place] Left [[id number]] Resulted:  09/09/20 1541     Updated:  09/09/20 1541    Narrative:       Left Knee Radiographs  Indication: status-post left total knee arthroplasty  Views: AP, " lateral, and sunrise views of the left knee    Comparison: no change compared to prior study, 10/2/2019    Findings:   The components are well aligned, with no signs of loosening or failure.    XR Knee 4+ View Right [[id number]] Resulted:  09/09/20 1540     Updated:  09/09/20 1541    Narrative:       Right Knee Radiographs  Indication: right knee pain  Views: Standing AP's and skiers of both knees, with lateral and sunrise   views of the right knee    Comparison: 8/30/2019    Findings:   Bone-on-bone contact medial compartment, varus alignment, tricompartmental   osteophytes, advanced patellofemoral degeneration, with no acute bony   abnormalities.  No significant worsening compared to previous films.            Assessment and Plan     Lilia was seen today for follow-up and pain.    Diagnoses and all orders for this visit:    S/P total knee arthroplasty, left  -     XR Knee 3+ View With [place] Left    Primary osteoarthritis of right knee  -     XR Knee 4+ View Right    Postoperative examination        1. S/P total knee arthroplasty, left    2. Primary osteoarthritis of right knee    3. Postoperative examination        I reviewed my findings with the patient today.  Her left total knee arthroplasty is functioning well, and she is pleased with results.  She is now a year out from surgery, and is doing quite well.    At this point, she would like to proceed with right total knee arthroplasty surgery.  She has exhausted conservative treatment options.  Please see my counseling note for details.  She is thinking about surgery at the first part of the year.    Surgical Counseling     I have informed the patient of the diagnosis and the prognosis.  Exhaustive conservative treatment modalities have not resulted in long term pain relief.  The symptoms have progressed to the point of daily pain and inability to perform activities of daily living without significant pain. The patient has reached the point of desiring to  proceed with total knee arthroplasty after discussing the risks, benefits and alternatives to the procedure.  The surgical procedure itself was discussed in detail. Risks of the procedure were discussed, which included but are not limited to, bleeding, infection, damage to blood vessels and nerves, incomplete pain relief, loosening of the prosthesis (early or late), deep infection (early or late), need for further surgery, loss of limb, deep venous thrombosis, pulmonary embolus, death, heart attack, stroke, kidney failure, liver failure, and anesthetic complications.  In addition, the potential for deep infection developing in the future was discussed, which could require further surgery.  The knee would have to be re-opened, debrided, and potentially remove the prosthesis, which may or may not be replaced in the future.  Also, the possibility for loosening of the prosthesis has been mentioned.  If the prosthesis loosened, a revision arthroplasty could be performed, with results that are not as predictable compared to the original procedure.  The typical rehabilitative course has also been discussed, and full recovery may take up to a year to see the maximum benefit.  The importance of patient cooperation in the rehabilitative efforts has also been discussed.  No guarantees were given.  The patient understands the potential risks versus the benefits and desires to proceed with total knee arthroplasty at a mutually convenient time.    Return for surgery.    Medical Decision Making  Management Options : major surgery with risk factors  Data/Risk: radiology tests and independent visualization of imaging, lab tests, or EMG/NCV      Jeffrey Sutton MD  09/09/20  17:25    Dragon disclaimer:  Much of this encounter note is an electronic transcription/translation of spoken language to printed text. The electronic translation of spoken language may permit erroneous, or at times, nonsensical words or phrases to be  inadvertently transcribed; Although I have reviewed the note for such errors, some may still exist.

## 2020-10-02 ENCOUNTER — PREP FOR SURGERY (OUTPATIENT)
Dept: OTHER | Facility: HOSPITAL | Age: 75
End: 2020-10-02

## 2020-10-02 DIAGNOSIS — M17.11 PRIMARY OSTEOARTHRITIS OF RIGHT KNEE: Primary | ICD-10-CM

## 2020-10-02 RX ORDER — ACETAMINOPHEN 500 MG
1000 TABLET ORAL ONCE
Status: CANCELLED | OUTPATIENT
Start: 2020-10-02 | End: 2020-10-02

## 2020-10-02 RX ORDER — MELOXICAM 15 MG/1
15 TABLET ORAL ONCE
Status: CANCELLED | OUTPATIENT
Start: 2020-10-02 | End: 2020-10-02

## 2020-10-02 RX ORDER — CEFAZOLIN SODIUM 2 G/100ML
2 INJECTION, SOLUTION INTRAVENOUS ONCE
Status: CANCELLED | OUTPATIENT
Start: 2020-10-02 | End: 2020-10-02

## 2020-10-02 RX ORDER — PREGABALIN 150 MG/1
150 CAPSULE ORAL ONCE
Status: CANCELLED | OUTPATIENT
Start: 2020-10-02 | End: 2020-10-02

## 2020-12-23 ENCOUNTER — APPOINTMENT (OUTPATIENT)
Dept: PREADMISSION TESTING | Facility: HOSPITAL | Age: 75
End: 2020-12-23

## 2020-12-28 ENCOUNTER — APPOINTMENT (OUTPATIENT)
Dept: PREADMISSION TESTING | Facility: HOSPITAL | Age: 75
End: 2020-12-28

## 2020-12-28 VITALS — WEIGHT: 164.4 LBS | BODY MASS INDEX: 31.04 KG/M2 | HEIGHT: 61 IN

## 2020-12-28 DIAGNOSIS — M17.11 PRIMARY OSTEOARTHRITIS OF RIGHT KNEE: ICD-10-CM

## 2020-12-28 LAB
ANION GAP SERPL CALCULATED.3IONS-SCNC: 11 MMOL/L (ref 5–15)
APTT PPP: 26.9 SECONDS (ref 24–37)
BASOPHILS # BLD AUTO: 0.05 10*3/MM3 (ref 0–0.2)
BASOPHILS NFR BLD AUTO: 0.9 % (ref 0–1.5)
BUN SERPL-MCNC: 15 MG/DL (ref 8–23)
BUN/CREAT SERPL: 23.4 (ref 7–25)
CALCIUM SPEC-SCNC: 8.9 MG/DL (ref 8.6–10.5)
CHLORIDE SERPL-SCNC: 100 MMOL/L (ref 98–107)
CO2 SERPL-SCNC: 28 MMOL/L (ref 22–29)
CREAT SERPL-MCNC: 0.64 MG/DL (ref 0.57–1)
CRP SERPL-MCNC: 0.25 MG/DL (ref 0–0.5)
DEPRECATED RDW RBC AUTO: 50.2 FL (ref 37–54)
EOSINOPHIL # BLD AUTO: 0.14 10*3/MM3 (ref 0–0.4)
EOSINOPHIL NFR BLD AUTO: 2.4 % (ref 0.3–6.2)
ERYTHROCYTE [DISTWIDTH] IN BLOOD BY AUTOMATED COUNT: 15.7 % (ref 12.3–15.4)
ERYTHROCYTE [SEDIMENTATION RATE] IN BLOOD: 36 MM/HR (ref 0–30)
GFR SERPL CREATININE-BSD FRML MDRD: 91 ML/MIN/1.73
GLUCOSE SERPL-MCNC: 109 MG/DL (ref 65–99)
HBA1C MFR BLD: 5.9 % (ref 4.8–5.6)
HCT VFR BLD AUTO: 38 % (ref 34–46.6)
HGB BLD-MCNC: 11.5 G/DL (ref 12–15.9)
IMM GRANULOCYTES # BLD AUTO: 0 10*3/MM3 (ref 0–0.05)
IMM GRANULOCYTES NFR BLD AUTO: 0 % (ref 0–0.5)
INR PPP: 1.03 (ref 0.85–1.16)
LYMPHOCYTES # BLD AUTO: 1.77 10*3/MM3 (ref 0.7–3.1)
LYMPHOCYTES NFR BLD AUTO: 30.3 % (ref 19.6–45.3)
MCH RBC QN AUTO: 26.5 PG (ref 26.6–33)
MCHC RBC AUTO-ENTMCNC: 30.3 G/DL (ref 31.5–35.7)
MCV RBC AUTO: 87.6 FL (ref 79–97)
MONOCYTES # BLD AUTO: 0.6 10*3/MM3 (ref 0.1–0.9)
MONOCYTES NFR BLD AUTO: 10.3 % (ref 5–12)
NEUTROPHILS NFR BLD AUTO: 3.28 10*3/MM3 (ref 1.7–7)
NEUTROPHILS NFR BLD AUTO: 56.1 % (ref 42.7–76)
NRBC BLD AUTO-RTO: 0 /100 WBC (ref 0–0.2)
PLATELET # BLD AUTO: 273 10*3/MM3 (ref 140–450)
PMV BLD AUTO: 9.6 FL (ref 6–12)
POTASSIUM SERPL-SCNC: 3.9 MMOL/L (ref 3.5–5.2)
PROTHROMBIN TIME: 13.2 SECONDS (ref 11.5–14)
QT INTERVAL: 390 MS
QTC INTERVAL: 444 MS
RBC # BLD AUTO: 4.34 10*6/MM3 (ref 3.77–5.28)
SODIUM SERPL-SCNC: 139 MMOL/L (ref 136–145)
WBC # BLD AUTO: 5.84 10*3/MM3 (ref 3.4–10.8)

## 2020-12-28 PROCEDURE — 85610 PROTHROMBIN TIME: CPT

## 2020-12-28 PROCEDURE — 86140 C-REACTIVE PROTEIN: CPT

## 2020-12-28 PROCEDURE — 85730 THROMBOPLASTIN TIME PARTIAL: CPT

## 2020-12-28 PROCEDURE — 83036 HEMOGLOBIN GLYCOSYLATED A1C: CPT

## 2020-12-28 PROCEDURE — 85025 COMPLETE CBC W/AUTO DIFF WBC: CPT

## 2020-12-28 PROCEDURE — 36415 COLL VENOUS BLD VENIPUNCTURE: CPT

## 2020-12-28 PROCEDURE — 93010 ELECTROCARDIOGRAM REPORT: CPT | Performed by: INTERNAL MEDICINE

## 2020-12-28 PROCEDURE — 80048 BASIC METABOLIC PNL TOTAL CA: CPT

## 2020-12-28 PROCEDURE — 93005 ELECTROCARDIOGRAM TRACING: CPT

## 2020-12-28 PROCEDURE — 85652 RBC SED RATE AUTOMATED: CPT

## 2020-12-28 ASSESSMENT — KOOS JR
KOOS JR SCORE: 12
KOOS JR SCORE: 57.14

## 2020-12-28 NOTE — PAT
Patient to apply Chlorhexadine wipes  to surgical area (as instructed) the night before procedure and the AM of procedure. Wipes provided.    Patient instructed to drink 20 ounces (or until full) of Gatorade and it needs to be completed 1 hour before given arrival time for procedure (NO RED Gatorade)    Patient verbalized understanding.    Per Anesthesia Request, patient instructed not to take their ACE/ARB medications on the AM of surgery.    Patient unable to attend in person joint replacement class today during PAT visit due to the temporary suspension of class due to COVID 19 restrictions.  Joint replacement handbook given to patient during PAT visit if they have not received a copy within the last one to two years.  Encouraged patient/family to read handbook thoroughly and to notify PAT staff with any questions or concerns.  Additionally a handout was provided directing patient to links to watch online videos related to joint replacement surgery on the [facility] website.  The handout also gives detailed instructions for joining an online joint replacement class that is offered on Tuesdays and Thursdays.  Patient agreed to join an online case and/or watch videos online therefore joint replacement teaching not completed in Pre Admission Testing.      Verified with patient that they received a prescription for Bactroban and Chlorhexidine shower from the office.  Reinforced with them to fill the prescription if they haven't already.  Verbal and written instructions given regarding proper use of the Bactroban and Chlorhexidine to patient and/or famlily during PAT visit. Patient/family also instructed to complete checklist and return it to Pre-op on the day of surgery.  Patient and/or family verbalized understanding.

## 2021-01-03 ENCOUNTER — APPOINTMENT (OUTPATIENT)
Dept: PREADMISSION TESTING | Facility: HOSPITAL | Age: 76
End: 2021-01-03

## 2021-01-03 PROCEDURE — C9803 HOPD COVID-19 SPEC COLLECT: HCPCS

## 2021-01-03 PROCEDURE — U0004 COV-19 TEST NON-CDC HGH THRU: HCPCS

## 2021-01-04 LAB — SARS-COV-2 RNA RESP QL NAA+PROBE: NOT DETECTED

## 2021-01-05 ENCOUNTER — APPOINTMENT (OUTPATIENT)
Dept: GENERAL RADIOLOGY | Facility: HOSPITAL | Age: 76
End: 2021-01-05

## 2021-01-05 ENCOUNTER — HOSPITAL ENCOUNTER (OUTPATIENT)
Facility: HOSPITAL | Age: 76
Discharge: HOME HEALTH | End: 2021-01-09
Attending: EMERGENCY MEDICINE | Admitting: ORTHOPAEDIC SURGERY

## 2021-01-05 ENCOUNTER — ANESTHESIA EVENT CONVERTED (OUTPATIENT)
Dept: ANESTHESIOLOGY | Facility: HOSPITAL | Age: 76
End: 2021-01-05

## 2021-01-05 ENCOUNTER — ANESTHESIA (OUTPATIENT)
Dept: PERIOP | Facility: HOSPITAL | Age: 76
End: 2021-01-05

## 2021-01-05 ENCOUNTER — ANESTHESIA EVENT (OUTPATIENT)
Dept: PERIOP | Facility: HOSPITAL | Age: 76
End: 2021-01-05

## 2021-01-05 ENCOUNTER — HOSPITAL ENCOUNTER (OUTPATIENT)
Facility: HOSPITAL | Age: 76
Discharge: HOME HEALTH | End: 2021-01-05
Attending: ORTHOPAEDIC SURGERY | Admitting: ORTHOPAEDIC SURGERY

## 2021-01-05 VITALS
HEART RATE: 75 BPM | RESPIRATION RATE: 18 BRPM | SYSTOLIC BLOOD PRESSURE: 155 MMHG | OXYGEN SATURATION: 96 % | TEMPERATURE: 97.7 F | DIASTOLIC BLOOD PRESSURE: 88 MMHG

## 2021-01-05 DIAGNOSIS — Z96.651 S/P TOTAL KNEE ARTHROPLASTY, RIGHT: Primary | ICD-10-CM

## 2021-01-05 DIAGNOSIS — Z96.652 STATUS POST TOTAL LEFT KNEE REPLACEMENT: ICD-10-CM

## 2021-01-05 DIAGNOSIS — G89.18 ACUTE POSTOPERATIVE PAIN OF RIGHT KNEE: Primary | ICD-10-CM

## 2021-01-05 DIAGNOSIS — Z96.651 S/P TOTAL KNEE ARTHROPLASTY, RIGHT: ICD-10-CM

## 2021-01-05 DIAGNOSIS — M25.561 ACUTE POSTOPERATIVE PAIN OF RIGHT KNEE: Primary | ICD-10-CM
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
## 2021-01-05 DIAGNOSIS — M17.11 PRIMARY OSTEOARTHRITIS OF RIGHT KNEE: ICD-10-CM

## 2021-01-05 DIAGNOSIS — G89.18 ACUTE POSTOPERATIVE PAIN: ICD-10-CM

## 2021-01-05 DIAGNOSIS — S76.111A RUPTURE OF RIGHT QUADRICEPS TENDON, INITIAL ENCOUNTER: ICD-10-CM

## 2021-01-05 LAB
GLUCOSE BLDC GLUCOMTR-MCNC: 114 MG/DL (ref 70–130)
POTASSIUM SERPL-SCNC: 3.4 MMOL/L (ref 3.5–5.2)

## 2021-01-05 PROCEDURE — 63710000001 ACETAMINOPHEN 500 MG TABLET: Performed by: ORTHOPAEDIC SURGERY

## 2021-01-05 PROCEDURE — 96372 THER/PROPH/DIAG INJ SC/IM: CPT

## 2021-01-05 PROCEDURE — 97165 OT EVAL LOW COMPLEX 30 MIN: CPT

## 2021-01-05 PROCEDURE — 97161 PT EVAL LOW COMPLEX 20 MIN: CPT

## 2021-01-05 PROCEDURE — 25010000002 CEFAZOLIN PER 500 MG: Performed by: ORTHOPAEDIC SURGERY

## 2021-01-05 PROCEDURE — 63710000001 MUPIROCIN 2 % OINTMENT 1 G TUBE: Performed by: ORTHOPAEDIC SURGERY

## 2021-01-05 PROCEDURE — 82962 GLUCOSE BLOOD TEST: CPT

## 2021-01-05 PROCEDURE — A9270 NON-COVERED ITEM OR SERVICE: HCPCS | Performed by: ORTHOPAEDIC SURGERY

## 2021-01-05 PROCEDURE — 25010000002 PROPOFOL 10 MG/ML EMULSION: Performed by: NURSE ANESTHETIST, CERTIFIED REGISTERED

## 2021-01-05 PROCEDURE — 25010000002 ONDANSETRON PER 1 MG: Performed by: NURSE ANESTHETIST, CERTIFIED REGISTERED

## 2021-01-05 PROCEDURE — S0260 H&P FOR SURGERY: HCPCS | Performed by: ORTHOPAEDIC SURGERY

## 2021-01-05 PROCEDURE — A9270 NON-COVERED ITEM OR SERVICE: HCPCS | Performed by: ANESTHESIOLOGY

## 2021-01-05 PROCEDURE — 25010000002 ROPIVACAINE PER 1 MG: Performed by: NURSE ANESTHETIST, CERTIFIED REGISTERED

## 2021-01-05 PROCEDURE — 27447 TOTAL KNEE ARTHROPLASTY: CPT | Performed by: ORTHOPAEDIC SURGERY

## 2021-01-05 PROCEDURE — 99284 EMERGENCY DEPT VISIT MOD MDM: CPT

## 2021-01-05 PROCEDURE — 73560 X-RAY EXAM OF KNEE 1 OR 2: CPT

## 2021-01-05 PROCEDURE — 84132 ASSAY OF SERUM POTASSIUM: CPT | Performed by: ANESTHESIOLOGY

## 2021-01-05 PROCEDURE — 63710000001 FAMOTIDINE 20 MG TABLET: Performed by: ANESTHESIOLOGY

## 2021-01-05 PROCEDURE — 63710000001 MELOXICAM 15 MG TABLET: Performed by: ORTHOPAEDIC SURGERY

## 2021-01-05 PROCEDURE — 97535 SELF CARE MNGMENT TRAINING: CPT

## 2021-01-05 PROCEDURE — C1776 JOINT DEVICE (IMPLANTABLE): HCPCS | Performed by: ORTHOPAEDIC SURGERY

## 2021-01-05 PROCEDURE — A9270 NON-COVERED ITEM OR SERVICE: HCPCS | Performed by: INTERNAL MEDICINE

## 2021-01-05 PROCEDURE — 25010000002 DEXAMETHASONE PER 1 MG: Performed by: NURSE ANESTHETIST, CERTIFIED REGISTERED

## 2021-01-05 PROCEDURE — 97116 GAIT TRAINING THERAPY: CPT

## 2021-01-05 PROCEDURE — 25010000003 CEFAZOLIN IN DEXTROSE 2-4 GM/100ML-% SOLUTION: Performed by: ORTHOPAEDIC SURGERY

## 2021-01-05 PROCEDURE — 25010000002 HYDROMORPHONE 1 MG/ML SOLUTION: Performed by: EMERGENCY MEDICINE

## 2021-01-05 PROCEDURE — 63710000001 PREGABALIN 150 MG CAPSULE: Performed by: ORTHOPAEDIC SURGERY

## 2021-01-05 PROCEDURE — C1713 ANCHOR/SCREW BN/BN,TIS/BN: HCPCS | Performed by: ORTHOPAEDIC SURGERY

## 2021-01-05 PROCEDURE — 63710000001 LISINOPRIL 5 MG TABLET: Performed by: INTERNAL MEDICINE

## 2021-01-05 PROCEDURE — 25010000002 ROPIVACAINE PER 1 MG: Performed by: ORTHOPAEDIC SURGERY

## 2021-01-05 DEVICE — LEGION PS HIGH FLEX XLPE SZ 1-2 10MM
Type: IMPLANTABLE DEVICE | Site: KNEE | Status: FUNCTIONAL
Brand: LEGION

## 2021-01-05 DEVICE — IMPLANTABLE DEVICE: Type: IMPLANTABLE DEVICE | Site: KNEE | Status: FUNCTIONAL

## 2021-01-05 DEVICE — CMT BONE SIMPLEX/P FULL DOSE 10/PK: Type: IMPLANTABLE DEVICE | Site: KNEE | Status: FUNCTIONAL

## 2021-01-05 DEVICE — DEV CONTRL TISS STRATAFIX SYMM PDS PLUS VIL CT-1 45CM: Type: IMPLANTABLE DEVICE | Site: KNEE | Status: FUNCTIONAL

## 2021-01-05 DEVICE — DEV CONTRL TISS STRATAFIX SPIRAL MNCRYL UD 3/0 PLS 60CM: Type: IMPLANTABLE DEVICE | Site: KNEE | Status: FUNCTIONAL

## 2021-01-05 DEVICE — GENESIS II NON-POROUS TIBIAL                                    BASEPLATE SIZE 2 RIGHT
Type: IMPLANTABLE DEVICE | Site: KNEE | Status: FUNCTIONAL
Brand: GENESIS II

## 2021-01-05 DEVICE — GEN II 7.5MM RESUR PAT 32MM
Type: IMPLANTABLE DEVICE | Site: KNEE | Status: FUNCTIONAL
Brand: GENESIS II

## 2021-01-05 DEVICE — LEGION NARROW POSTERIOR STABILIZED                                    OXINIUM SIZE 4N RIGHT
Type: IMPLANTABLE DEVICE | Site: KNEE | Status: FUNCTIONAL
Brand: LEGION

## 2021-01-05 RX ORDER — BUPIVACAINE HYDROCHLORIDE 2.5 MG/ML
INJECTION, SOLUTION EPIDURAL; INFILTRATION; INTRACAUDAL
Status: COMPLETED | OUTPATIENT
Start: 2021-01-05 | End: 2021-01-05

## 2021-01-05 RX ORDER — CEFAZOLIN SODIUM 2 G/100ML
2 INJECTION, SOLUTION INTRAVENOUS EVERY 8 HOURS
Status: DISCONTINUED | OUTPATIENT
Start: 2021-01-05 | End: 2021-01-05 | Stop reason: HOSPADM

## 2021-01-05 RX ORDER — SODIUM CHLORIDE 9 MG/ML
120 INJECTION, SOLUTION INTRAVENOUS CONTINUOUS
Status: DISCONTINUED | OUTPATIENT
Start: 2021-01-05 | End: 2021-01-05 | Stop reason: HOSPADM

## 2021-01-05 RX ORDER — ASPIRIN 325 MG
325 TABLET, DELAYED RELEASE (ENTERIC COATED) ORAL DAILY
Qty: 30 TABLET | Refills: 0 | Status: SHIPPED | OUTPATIENT
Start: 2021-01-05 | End: 2021-02-04

## 2021-01-05 RX ORDER — SODIUM CHLORIDE, SODIUM LACTATE, POTASSIUM CHLORIDE, CALCIUM CHLORIDE 600; 310; 30; 20 MG/100ML; MG/100ML; MG/100ML; MG/100ML
100 INJECTION, SOLUTION INTRAVENOUS CONTINUOUS
Status: DISCONTINUED | OUTPATIENT
Start: 2021-01-05 | End: 2021-01-05 | Stop reason: HOSPADM

## 2021-01-05 RX ORDER — ONDANSETRON 4 MG/1
4 TABLET, FILM COATED ORAL EVERY 6 HOURS PRN
Status: DISCONTINUED | OUTPATIENT
Start: 2021-01-05 | End: 2021-01-05 | Stop reason: HOSPADM

## 2021-01-05 RX ORDER — SODIUM CHLORIDE, SODIUM LACTATE, POTASSIUM CHLORIDE, CALCIUM CHLORIDE 600; 310; 30; 20 MG/100ML; MG/100ML; MG/100ML; MG/100ML
9 INJECTION, SOLUTION INTRAVENOUS CONTINUOUS
Status: DISCONTINUED | OUTPATIENT
Start: 2021-01-05 | End: 2021-01-05

## 2021-01-05 RX ORDER — CEFAZOLIN SODIUM 2 G/100ML
2 INJECTION, SOLUTION INTRAVENOUS ONCE
Status: COMPLETED | OUTPATIENT
Start: 2021-01-05 | End: 2021-01-05

## 2021-01-05 RX ORDER — FAMOTIDINE 20 MG/1
20 TABLET, FILM COATED ORAL ONCE
Status: COMPLETED | OUTPATIENT
Start: 2021-01-05 | End: 2021-01-05

## 2021-01-05 RX ORDER — LIDOCAINE HYDROCHLORIDE 10 MG/ML
INJECTION, SOLUTION EPIDURAL; INFILTRATION; INTRACAUDAL; PERINEURAL AS NEEDED
Status: DISCONTINUED | OUTPATIENT
Start: 2021-01-05 | End: 2021-01-05 | Stop reason: SURG

## 2021-01-05 RX ORDER — MORPHINE SULFATE 4 MG/ML
4 INJECTION, SOLUTION INTRAMUSCULAR; INTRAVENOUS
Status: DISCONTINUED | OUTPATIENT
Start: 2021-01-05 | End: 2021-01-05 | Stop reason: HOSPADM

## 2021-01-05 RX ORDER — BUPIVACAINE HYDROCHLORIDE 5 MG/ML
INJECTION, SOLUTION PERINEURAL
Status: COMPLETED | OUTPATIENT
Start: 2021-01-05 | End: 2021-01-05

## 2021-01-05 RX ORDER — EPHEDRINE SULFATE 50 MG/ML
5 INJECTION, SOLUTION INTRAVENOUS ONCE AS NEEDED
Status: DISCONTINUED | OUTPATIENT
Start: 2021-01-05 | End: 2021-01-05 | Stop reason: HOSPADM

## 2021-01-05 RX ORDER — SODIUM CHLORIDE 0.9 % (FLUSH) 0.9 %
10 SYRINGE (ML) INJECTION AS NEEDED
Status: DISCONTINUED | OUTPATIENT
Start: 2021-01-05 | End: 2021-01-05 | Stop reason: HOSPADM

## 2021-01-05 RX ORDER — ROPIVACAINE HYDROCHLORIDE 5 MG/ML
INJECTION, SOLUTION EPIDURAL; INFILTRATION; PERINEURAL AS NEEDED
Status: DISCONTINUED | OUTPATIENT
Start: 2021-01-05 | End: 2021-01-05 | Stop reason: HOSPADM

## 2021-01-05 RX ORDER — NALOXONE HCL 0.4 MG/ML
0.1 VIAL (ML) INJECTION
Status: DISCONTINUED | OUTPATIENT
Start: 2021-01-05 | End: 2021-01-05 | Stop reason: HOSPADM

## 2021-01-05 RX ORDER — ASPIRIN 81 MG/1
81 TABLET, CHEWABLE ORAL DAILY
Start: 2021-02-05

## 2021-01-05 RX ORDER — PREGABALIN 150 MG/1
150 CAPSULE ORAL ONCE
Status: COMPLETED | OUTPATIENT
Start: 2021-01-05 | End: 2021-01-05

## 2021-01-05 RX ORDER — ONDANSETRON 2 MG/ML
4 INJECTION INTRAMUSCULAR; INTRAVENOUS ONCE AS NEEDED
Status: DISCONTINUED | OUTPATIENT
Start: 2021-01-05 | End: 2021-01-05 | Stop reason: HOSPADM

## 2021-01-05 RX ORDER — SODIUM CHLORIDE 0.9 % (FLUSH) 0.9 %
1-10 SYRINGE (ML) INJECTION AS NEEDED
Status: DISCONTINUED | OUTPATIENT
Start: 2021-01-05 | End: 2021-01-05 | Stop reason: HOSPADM

## 2021-01-05 RX ORDER — ACETAMINOPHEN 500 MG
1000 TABLET ORAL EVERY 6 HOURS
Qty: 56 TABLET | Refills: 0
Start: 2021-01-05 | End: 2021-01-12

## 2021-01-05 RX ORDER — ASPIRIN 325 MG
325 TABLET, DELAYED RELEASE (ENTERIC COATED) ORAL DAILY
Status: DISCONTINUED | OUTPATIENT
Start: 2021-01-06 | End: 2021-01-05 | Stop reason: HOSPADM

## 2021-01-05 RX ORDER — ALBUTEROL SULFATE 2.5 MG/3ML
2.5 SOLUTION RESPIRATORY (INHALATION) EVERY 6 HOURS PRN
Status: DISCONTINUED | OUTPATIENT
Start: 2021-01-05 | End: 2021-01-05 | Stop reason: HOSPADM

## 2021-01-05 RX ORDER — LISINOPRIL 5 MG/1
5 TABLET ORAL
Status: DISCONTINUED | OUTPATIENT
Start: 2021-01-05 | End: 2021-01-05 | Stop reason: HOSPADM

## 2021-01-05 RX ORDER — DOCUSATE SODIUM 100 MG/1
100 CAPSULE, LIQUID FILLED ORAL 2 TIMES DAILY
Qty: 30 CAPSULE | Refills: 0 | Status: SHIPPED | OUTPATIENT
Start: 2021-01-05 | End: 2021-01-20

## 2021-01-05 RX ORDER — SODIUM CHLORIDE 0.9 % (FLUSH) 0.9 %
10 SYRINGE (ML) INJECTION EVERY 12 HOURS SCHEDULED
Status: DISCONTINUED | OUTPATIENT
Start: 2021-01-05 | End: 2021-01-05 | Stop reason: HOSPADM

## 2021-01-05 RX ORDER — LABETALOL HYDROCHLORIDE 5 MG/ML
10 INJECTION, SOLUTION INTRAVENOUS EVERY 4 HOURS PRN
Status: DISCONTINUED | OUTPATIENT
Start: 2021-01-05 | End: 2021-01-05 | Stop reason: HOSPADM

## 2021-01-05 RX ORDER — LIDOCAINE HYDROCHLORIDE 10 MG/ML
0.5 INJECTION, SOLUTION EPIDURAL; INFILTRATION; INTRACAUDAL; PERINEURAL ONCE AS NEEDED
Status: COMPLETED | OUTPATIENT
Start: 2021-01-05 | End: 2021-01-05

## 2021-01-05 RX ORDER — MELOXICAM 7.5 MG/1
15 TABLET ORAL DAILY
Status: DISCONTINUED | OUTPATIENT
Start: 2021-01-06 | End: 2021-01-05 | Stop reason: HOSPADM

## 2021-01-05 RX ORDER — MELOXICAM 15 MG/1
15 TABLET ORAL DAILY
Qty: 15 TABLET | Refills: 0 | Status: SHIPPED | OUTPATIENT
Start: 2021-01-05 | End: 2021-01-20

## 2021-01-05 RX ORDER — HYDROMORPHONE HYDROCHLORIDE 1 MG/ML
0.5 INJECTION, SOLUTION INTRAMUSCULAR; INTRAVENOUS; SUBCUTANEOUS
Status: DISCONTINUED | OUTPATIENT
Start: 2021-01-05 | End: 2021-01-05 | Stop reason: HOSPADM

## 2021-01-05 RX ORDER — ONDANSETRON 2 MG/ML
INJECTION INTRAMUSCULAR; INTRAVENOUS AS NEEDED
Status: DISCONTINUED | OUTPATIENT
Start: 2021-01-05 | End: 2021-01-05 | Stop reason: SURG

## 2021-01-05 RX ORDER — FENTANYL CITRATE 50 UG/ML
50 INJECTION, SOLUTION INTRAMUSCULAR; INTRAVENOUS
Status: DISCONTINUED | OUTPATIENT
Start: 2021-01-05 | End: 2021-01-05 | Stop reason: HOSPADM

## 2021-01-05 RX ORDER — OXYCODONE HYDROCHLORIDE 5 MG/1
5 TABLET ORAL EVERY 4 HOURS PRN
Status: DISCONTINUED | OUTPATIENT
Start: 2021-01-05 | End: 2021-01-05 | Stop reason: HOSPADM

## 2021-01-05 RX ORDER — NALOXONE HCL 0.4 MG/ML
0.4 VIAL (ML) INJECTION
Status: DISCONTINUED | OUTPATIENT
Start: 2021-01-05 | End: 2021-01-05 | Stop reason: HOSPADM

## 2021-01-05 RX ORDER — DEXAMETHASONE SODIUM PHOSPHATE 4 MG/ML
INJECTION, SOLUTION INTRA-ARTICULAR; INTRALESIONAL; INTRAMUSCULAR; INTRAVENOUS; SOFT TISSUE AS NEEDED
Status: DISCONTINUED | OUTPATIENT
Start: 2021-01-05 | End: 2021-01-05 | Stop reason: SURG

## 2021-01-05 RX ORDER — MELOXICAM 15 MG/1
15 TABLET ORAL ONCE
Status: COMPLETED | OUTPATIENT
Start: 2021-01-05 | End: 2021-01-05

## 2021-01-05 RX ORDER — MAGNESIUM HYDROXIDE 1200 MG/15ML
LIQUID ORAL AS NEEDED
Status: DISCONTINUED | OUTPATIENT
Start: 2021-01-05 | End: 2021-01-05 | Stop reason: HOSPADM

## 2021-01-05 RX ORDER — NALOXONE HCL 0.4 MG/ML
0.4 VIAL (ML) INJECTION AS NEEDED
Status: DISCONTINUED | OUTPATIENT
Start: 2021-01-05 | End: 2021-01-05 | Stop reason: HOSPADM

## 2021-01-05 RX ORDER — OXYCODONE HYDROCHLORIDE 5 MG/1
5 TABLET ORAL EVERY 4 HOURS PRN
Qty: 40 TABLET | Refills: 0 | Status: SHIPPED | OUTPATIENT
Start: 2021-01-05 | End: 2021-09-08

## 2021-01-05 RX ORDER — SODIUM CHLORIDE 0.9 % (FLUSH) 0.9 %
3 SYRINGE (ML) INJECTION EVERY 12 HOURS SCHEDULED
Status: DISCONTINUED | OUTPATIENT
Start: 2021-01-05 | End: 2021-01-05 | Stop reason: HOSPADM

## 2021-01-05 RX ORDER — ONDANSETRON 2 MG/ML
4 INJECTION INTRAMUSCULAR; INTRAVENOUS EVERY 6 HOURS PRN
Status: DISCONTINUED | OUTPATIENT
Start: 2021-01-05 | End: 2021-01-05 | Stop reason: HOSPADM

## 2021-01-05 RX ORDER — FAMOTIDINE 10 MG/ML
20 INJECTION, SOLUTION INTRAVENOUS ONCE
Status: CANCELLED | OUTPATIENT
Start: 2021-01-05 | End: 2021-01-05

## 2021-01-05 RX ORDER — SODIUM CHLORIDE 0.9 % (FLUSH) 0.9 %
10 SYRINGE (ML) INJECTION AS NEEDED
Status: DISCONTINUED | OUTPATIENT
Start: 2021-01-05 | End: 2021-01-09 | Stop reason: HOSPADM

## 2021-01-05 RX ORDER — ACETAMINOPHEN 500 MG
1000 TABLET ORAL ONCE
Status: COMPLETED | OUTPATIENT
Start: 2021-01-05 | End: 2021-01-05

## 2021-01-05 RX ORDER — PROPOFOL 10 MG/ML
VIAL (ML) INTRAVENOUS AS NEEDED
Status: DISCONTINUED | OUTPATIENT
Start: 2021-01-05 | End: 2021-01-05 | Stop reason: SURG

## 2021-01-05 RX ADMIN — ACETAMINOPHEN 1000 MG: 500 TABLET ORAL at 08:10

## 2021-01-05 RX ADMIN — LIDOCAINE HYDROCHLORIDE 0.2 ML: 10 INJECTION, SOLUTION EPIDURAL; INFILTRATION; INTRACAUDAL; PERINEURAL at 08:04

## 2021-01-05 RX ADMIN — DEXAMETHASONE SODIUM PHOSPHATE 8 MG: 4 INJECTION, SOLUTION INTRAMUSCULAR; INTRAVENOUS at 10:50

## 2021-01-05 RX ADMIN — FAMOTIDINE 20 MG: 20 TABLET, FILM COATED ORAL at 08:10

## 2021-01-05 RX ADMIN — PREGABALIN 150 MG: 150 CAPSULE ORAL at 08:10

## 2021-01-05 RX ADMIN — ONDANSETRON 4 MG: 2 INJECTION INTRAMUSCULAR; INTRAVENOUS at 11:13

## 2021-01-05 RX ADMIN — Medication 1000 MG: at 10:50

## 2021-01-05 RX ADMIN — PROPOFOL 75 MCG/KG/MIN: 10 INJECTION, EMULSION INTRAVENOUS at 09:54

## 2021-01-05 RX ADMIN — MELOXICAM 15 MG: 15 TABLET ORAL at 08:10

## 2021-01-05 RX ADMIN — BUPIVACAINE HYDROCHLORIDE 20 ML: 2.5 INJECTION, SOLUTION EPIDURAL; INFILTRATION; INTRACAUDAL; PERINEURAL at 12:00

## 2021-01-05 RX ADMIN — SODIUM CHLORIDE, POTASSIUM CHLORIDE, SODIUM LACTATE AND CALCIUM CHLORIDE 9 ML/HR: 600; 310; 30; 20 INJECTION, SOLUTION INTRAVENOUS at 08:04

## 2021-01-05 RX ADMIN — Medication 1000 MG: at 09:56

## 2021-01-05 RX ADMIN — CEFAZOLIN 2 G: 10 INJECTION, POWDER, FOR SOLUTION INTRAVENOUS at 17:05

## 2021-01-05 RX ADMIN — LIDOCAINE HYDROCHLORIDE 2 ML: 10 INJECTION, SOLUTION EPIDURAL; INFILTRATION; INTRACAUDAL; PERINEURAL at 09:44

## 2021-01-05 RX ADMIN — BUPIVACAINE HYDROCHLORIDE 1.8 ML: 5 INJECTION, SOLUTION PERINEURAL at 09:53

## 2021-01-05 RX ADMIN — ROPIVACAINE HYDROCHLORIDE 10 ML/HR: 5 INJECTION, SOLUTION EPIDURAL; INFILTRATION; PERINEURAL at 12:15

## 2021-01-05 RX ADMIN — CEFAZOLIN SODIUM 2 G: 2 INJECTION, SOLUTION INTRAVENOUS at 09:44

## 2021-01-05 RX ADMIN — PROPOFOL 50 MG: 10 INJECTION, EMULSION INTRAVENOUS at 09:44

## 2021-01-05 RX ADMIN — HYDROMORPHONE HYDROCHLORIDE 1 MG: 1 INJECTION, SOLUTION INTRAMUSCULAR; INTRAVENOUS; SUBCUTANEOUS at 23:59

## 2021-01-05 RX ADMIN — SODIUM CHLORIDE, POTASSIUM CHLORIDE, SODIUM LACTATE AND CALCIUM CHLORIDE: 600; 310; 30; 20 INJECTION, SOLUTION INTRAVENOUS at 11:55

## 2021-01-05 RX ADMIN — SODIUM CHLORIDE 120 ML/HR: 9 INJECTION, SOLUTION INTRAVENOUS at 12:59

## 2021-01-05 RX ADMIN — LISINOPRIL 5 MG: 5 TABLET ORAL at 14:09

## 2021-01-05 RX ADMIN — MUPIROCIN 1 APPLICATION: 20 OINTMENT TOPICAL at 08:12

## 2021-01-05 NOTE — ANESTHESIA PROCEDURE NOTES
Spinal Block      Patient reassessed immediately prior to procedure    Patient location during procedure: OR  Indication:at surgeon's request  Performed By  CRNA: Sami Agosto CRNA  Preanesthetic Checklist  Completed: patient identified, site marked, surgical consent, pre-op evaluation, timeout performed, IV checked, risks and benefits discussed and monitors and equipment checked  Spinal Block Prep:  Patient Position:sitting  Sterile Tech:cap, gloves, sterile barriers and mask  Prep:Chloraprep  Patient Monitoring:blood pressure monitoring, continuous pulse oximetry and EKG  Spinal Block Procedure  Approach:midline  Guidance:landmark technique and palpation technique  Location:L4-L5  Needle Type:Sprotte  Needle Gauge:25 G  Placement of Spinal needle event:cerebrospinal fluid aspirated  Paresthesia: no  Fluid Appearance:clear  Medications: bupivacaine (MARCAINE) 0.5 % injection, 1.8 mL  Med Administered at 1/5/2021 9:53 AM   Post Assessment  Patient Tolerance:patient tolerated the procedure well with no apparent complications  Complications no  Additional Notes  Procedure:  Pt assisted to sitting position, with legs in position of comfort over side of bed.  Pt. instructed in optimal spine presentation, the spine was prepped/ Draped and the skin at insertion site was anesthetized with 1% Lidocaine 2 ml.  The spinal needle was then advanced until CSF flow was obtained and LA was injected:

## 2021-01-05 NOTE — ANESTHESIA PREPROCEDURE EVALUATION
Anesthesia Evaluation     Patient summary reviewed and Nursing notes reviewed   NPO Solid Status: > 8 hours  NPO Liquid Status: > 8 hours           Airway   Mallampati: II  TM distance: >3 FB  Neck ROM: full  No difficulty expected  Dental - normal exam     Pulmonary     breath sounds clear to auscultation  Cardiovascular   Exercise tolerance: good (4-7 METS)    Rhythm: regular  Rate: normal        Neuro/Psych  GI/Hepatic/Renal/Endo      Musculoskeletal     Abdominal    Substance History      OB/GYN          Other                        Anesthesia Plan    ASA 2     spinal     intravenous induction     Anesthetic plan, all risks, benefits, and alternatives have been provided, discussed and informed consent has been obtained with: patient.    adductor canal block in pacu for post op pain control

## 2021-01-05 NOTE — ANESTHESIA PROCEDURE NOTES
Peripheral Block      Patient reassessed immediately prior to procedure    Patient location during procedure: post-op  Reason for block: at surgeon's request and post-op pain management  Performed by  CRNA: Amador Joel CRNA  Assisted by: Mckenzie Sears RN  Preanesthetic Checklist  Completed: patient identified, site marked, surgical consent, pre-op evaluation, timeout performed, IV checked, risks and benefits discussed and monitors and equipment checked  Prep:  Pt Position: supine  Sterile barriers:cap, gloves, mask and sterile barriers  Prep: ChloraPrep  Patient monitoring: blood pressure monitoring, continuous pulse oximetry and EKG  Procedure  Performed under: spinal  Guidance:ultrasound guided  Images:still images obtained, printed/placed on chart    Laterality:right  Block Type:adductor canal block  Injection Technique:catheter  Needle Type:Tuohy and echogenic  Needle Gauge:18 G  Resistance on Injection: none  Catheter Size:20 G (20g)  Cath Depth at skin: 10 cm    Medications Used: bupivacaine PF (MARCAINE) 0.25 % injection, 20 mL  Med admintered at 1/5/2021 12:00 PM      Post Assessment  Injection Assessment: negative aspiration for heme, incremental injection and no paresthesia on injection  Patient Tolerance:comfortable throughout block  Complications:no  Additional Notes  Procedure:             The pt was placed in the Supine position.  The Insertion site was  prepped and Draped in sterile fashion.  The pt was anesthetized with  IV Sedation( see meds).  Skin and cutaneous tissue was infiltrated and anesthetized with 1% Lidocaine 3 mls via a 25g needle.  A BBraun 4 inch 18g echogenic needle was then  inserted approximately midline, mid-thigh and advanced In-plane with Ultrasound guidance.  Normal Saline PSF was utilized for hydrodissection of tissue.  The Vastus medialis and Sartorius muscle where visualized and the needle tip was placed in the adductor canal,  lateral to the femoral artery.  LA  injection spread was visualized, injection was incremental 1-5ml, injection pressure was normal or little, no intraneural injection, no vascular injection.  LA dose was injected thru the needle(see dose above).  A BBraun 20g wire stylet catheter was placed via the needle with ultrasound visualization and confirmation with NS fluid bolus. The labeled Catheter was then secured to skin at insertion site with skin afix and steristrips to curled catheter and CHG transparent dressing.  Thank you.

## 2021-01-05 NOTE — ANESTHESIA POSTPROCEDURE EVALUATION
Patient: Lilai Murcia    Procedure Summary     Date: 01/05/21 Room / Location:  [facility]  /  [facility]    Anesthesia Start: 0944 Anesthesia Stop:     Procedure: TOTAL KNEE ARTHROPLASTY RIGHT (Right Knee) Diagnosis:       Primary osteoarthritis of right knee      (Primary osteoarthritis of right knee [M17.11])    Surgeon: Jeffrey Sutton MD Provider: Mason Perea MD    Anesthesia Type: spinal ASA Status: 2          Anesthesia Type: spinal    Vitals  Vitals Value Taken Time   /72 01/05/21 1205   Temp     Pulse 70 01/05/21 1209   Resp     SpO2 100 % 01/05/21 1209   Vitals shown include unvalidated device data.        Post Anesthesia Care and Evaluation    Patient location during evaluation: PACU  Patient participation: complete - patient participated  Level of consciousness: awake and alert  Pain score: 0  Pain management: adequate  Airway patency: patent  Anesthetic complications: No anesthetic complications  PONV Status: none  Cardiovascular status: hemodynamically stable and acceptable  Respiratory status: nonlabored ventilation, acceptable and nasal cannula  Hydration status: acceptable

## 2021-01-06 ENCOUNTER — PREP FOR SURGERY (OUTPATIENT)
Dept: OTHER | Facility: HOSPITAL | Age: 76
End: 2021-01-06

## 2021-01-06 ENCOUNTER — ANESTHESIA EVENT (OUTPATIENT)
Dept: PERIOP | Facility: HOSPITAL | Age: 76
End: 2021-01-06

## 2021-01-06 DIAGNOSIS — S76.111A RUPTURE OF RIGHT QUADRICEPS TENDON, INITIAL ENCOUNTER: Primary | ICD-10-CM

## 2021-01-06 PROBLEM — M25.561 ACUTE POSTOPERATIVE PAIN OF RIGHT KNEE: Status: ACTIVE | Noted: 2021-01-06

## 2021-01-06 PROBLEM — G89.18 ACUTE POSTOPERATIVE PAIN OF RIGHT KNEE: Status: ACTIVE | Noted: 2021-01-06

## 2021-01-06 PROBLEM — S76.119A QUADRICEPS TENDON RUPTURE: Status: ACTIVE | Noted: 2021-01-06

## 2021-01-06 LAB
ANION GAP SERPL CALCULATED.3IONS-SCNC: 10 MMOL/L (ref 5–15)
BUN SERPL-MCNC: 16 MG/DL (ref 8–23)
BUN/CREAT SERPL: 22.2 (ref 7–25)
CALCIUM SPEC-SCNC: 8.4 MG/DL (ref 8.6–10.5)
CHLORIDE SERPL-SCNC: 101 MMOL/L (ref 98–107)
CO2 SERPL-SCNC: 28 MMOL/L (ref 22–29)
CREAT SERPL-MCNC: 0.72 MG/DL (ref 0.57–1)
DEPRECATED RDW RBC AUTO: 51 FL (ref 37–54)
ERYTHROCYTE [DISTWIDTH] IN BLOOD BY AUTOMATED COUNT: 15.7 % (ref 12.3–15.4)
GFR SERPL CREATININE-BSD FRML MDRD: 79 ML/MIN/1.73
GLUCOSE SERPL-MCNC: 181 MG/DL (ref 65–99)
HCT VFR BLD AUTO: 34.3 % (ref 34–46.6)
HGB BLD-MCNC: 10.7 G/DL (ref 12–15.9)
MCH RBC QN AUTO: 27.6 PG (ref 26.6–33)
MCHC RBC AUTO-ENTMCNC: 31.2 G/DL (ref 31.5–35.7)
MCV RBC AUTO: 88.6 FL (ref 79–97)
PLATELET # BLD AUTO: 266 10*3/MM3 (ref 140–450)
PMV BLD AUTO: 9.9 FL (ref 6–12)
POTASSIUM SERPL-SCNC: 4 MMOL/L (ref 3.5–5.2)
RBC # BLD AUTO: 3.87 10*6/MM3 (ref 3.77–5.28)
SODIUM SERPL-SCNC: 139 MMOL/L (ref 136–145)
WBC # BLD AUTO: 14.12 10*3/MM3 (ref 3.4–10.8)

## 2021-01-06 PROCEDURE — 27486 REVISE/REPLACE KNEE JOINT: CPT | Performed by: ORTHOPAEDIC SURGERY

## 2021-01-06 PROCEDURE — 85027 COMPLETE CBC AUTOMATED: CPT | Performed by: INTERNAL MEDICINE

## 2021-01-06 PROCEDURE — 80048 BASIC METABOLIC PNL TOTAL CA: CPT | Performed by: INTERNAL MEDICINE

## 2021-01-06 PROCEDURE — 63710000001 PANTOPRAZOLE 40 MG TABLET DELAYED-RELEASE: Performed by: INTERNAL MEDICINE

## 2021-01-06 PROCEDURE — A9270 NON-COVERED ITEM OR SERVICE: HCPCS | Performed by: INTERNAL MEDICINE

## 2021-01-06 PROCEDURE — G0378 HOSPITAL OBSERVATION PER HR: HCPCS

## 2021-01-06 PROCEDURE — 63710000001 DOCUSATE SODIUM 100 MG CAPSULE: Performed by: INTERNAL MEDICINE

## 2021-01-06 PROCEDURE — 63710000001 ACETAMINOPHEN 500 MG TABLET: Performed by: INTERNAL MEDICINE

## 2021-01-06 PROCEDURE — 99024 POSTOP FOLLOW-UP VISIT: CPT | Performed by: ORTHOPAEDIC SURGERY

## 2021-01-06 PROCEDURE — 63710000001 MELOXICAM 7.5 MG TABLET: Performed by: INTERNAL MEDICINE

## 2021-01-06 RX ORDER — DOCUSATE SODIUM 100 MG/1
100 CAPSULE, LIQUID FILLED ORAL 2 TIMES DAILY
Status: DISCONTINUED | OUTPATIENT
Start: 2021-01-06 | End: 2021-01-09 | Stop reason: HOSPADM

## 2021-01-06 RX ORDER — ONDANSETRON 4 MG/1
4 TABLET, FILM COATED ORAL EVERY 6 HOURS PRN
Status: DISCONTINUED | OUTPATIENT
Start: 2021-01-06 | End: 2021-01-07

## 2021-01-06 RX ORDER — MELOXICAM 7.5 MG/1
15 TABLET ORAL DAILY
Status: DISCONTINUED | OUTPATIENT
Start: 2021-01-06 | End: 2021-01-09 | Stop reason: HOSPADM

## 2021-01-06 RX ORDER — ASPIRIN 325 MG
325 TABLET, DELAYED RELEASE (ENTERIC COATED) ORAL DAILY
Status: DISCONTINUED | OUTPATIENT
Start: 2021-01-06 | End: 2021-01-09 | Stop reason: HOSPADM

## 2021-01-06 RX ORDER — FAMOTIDINE 20 MG/1
20 TABLET, FILM COATED ORAL ONCE
Status: CANCELLED | OUTPATIENT
Start: 2021-01-06 | End: 2021-01-06

## 2021-01-06 RX ORDER — CEFAZOLIN SODIUM 2 G/100ML
2 INJECTION, SOLUTION INTRAVENOUS
Status: COMPLETED | OUTPATIENT
Start: 2021-01-06 | End: 2021-01-07

## 2021-01-06 RX ORDER — ALBUTEROL SULFATE 2.5 MG/3ML
2.5 SOLUTION RESPIRATORY (INHALATION) EVERY 6 HOURS PRN
Status: DISCONTINUED | OUTPATIENT
Start: 2021-01-06 | End: 2021-01-09 | Stop reason: HOSPADM

## 2021-01-06 RX ORDER — PANTOPRAZOLE SODIUM 40 MG/1
40 TABLET, DELAYED RELEASE ORAL
Status: DISCONTINUED | OUTPATIENT
Start: 2021-01-06 | End: 2021-01-09 | Stop reason: HOSPADM

## 2021-01-06 RX ORDER — OXYCODONE HYDROCHLORIDE 5 MG/1
5 TABLET ORAL EVERY 4 HOURS PRN
Status: DISCONTINUED | OUTPATIENT
Start: 2021-01-06 | End: 2021-01-09 | Stop reason: HOSPADM

## 2021-01-06 RX ORDER — SODIUM CHLORIDE 0.9 % (FLUSH) 0.9 %
10 SYRINGE (ML) INJECTION AS NEEDED
Status: DISCONTINUED | OUTPATIENT
Start: 2021-01-06 | End: 2021-01-09 | Stop reason: HOSPADM

## 2021-01-06 RX ORDER — ACETAMINOPHEN 500 MG
1000 TABLET ORAL EVERY 6 HOURS SCHEDULED
Status: DISCONTINUED | OUTPATIENT
Start: 2021-01-06 | End: 2021-01-09 | Stop reason: HOSPADM

## 2021-01-06 RX ORDER — SODIUM CHLORIDE 0.9 % (FLUSH) 0.9 %
10 SYRINGE (ML) INJECTION EVERY 12 HOURS SCHEDULED
Status: DISCONTINUED | OUTPATIENT
Start: 2021-01-06 | End: 2021-01-09 | Stop reason: HOSPADM

## 2021-01-06 RX ADMIN — ACETAMINOPHEN 1000 MG: 500 TABLET ORAL at 05:11

## 2021-01-06 RX ADMIN — PANTOPRAZOLE SODIUM 40 MG: 40 TABLET, DELAYED RELEASE ORAL at 05:11

## 2021-01-06 RX ADMIN — SODIUM CHLORIDE, PRESERVATIVE FREE 10 ML: 5 INJECTION INTRAVENOUS at 21:26

## 2021-01-06 RX ADMIN — ACETAMINOPHEN 1000 MG: 500 TABLET ORAL at 11:01

## 2021-01-06 RX ADMIN — DOCUSATE SODIUM 100 MG: 100 CAPSULE ORAL at 08:21

## 2021-01-06 RX ADMIN — ACETAMINOPHEN 1000 MG: 500 TABLET ORAL at 17:27

## 2021-01-06 RX ADMIN — MELOXICAM 15 MG: 7.5 TABLET ORAL at 08:21

## 2021-01-07 ENCOUNTER — ANESTHESIA (OUTPATIENT)
Dept: PERIOP | Facility: HOSPITAL | Age: 76
End: 2021-01-07

## 2021-01-07 ENCOUNTER — APPOINTMENT (OUTPATIENT)
Dept: GENERAL RADIOLOGY | Facility: HOSPITAL | Age: 76
End: 2021-01-07

## 2021-01-07 ENCOUNTER — ANESTHESIA EVENT CONVERTED (OUTPATIENT)
Dept: ANESTHESIOLOGY | Facility: HOSPITAL | Age: 76
End: 2021-01-07

## 2021-01-07 LAB
BASOPHILS # BLD AUTO: 0.06 10*3/MM3 (ref 0–0.2)
BASOPHILS NFR BLD AUTO: 0.5 % (ref 0–1.5)
DEPRECATED RDW RBC AUTO: 53.4 FL (ref 37–54)
EOSINOPHIL # BLD AUTO: 0.08 10*3/MM3 (ref 0–0.4)
EOSINOPHIL NFR BLD AUTO: 0.7 % (ref 0.3–6.2)
ERYTHROCYTE [DISTWIDTH] IN BLOOD BY AUTOMATED COUNT: 16.1 % (ref 12.3–15.4)
HCT VFR BLD AUTO: 29.4 % (ref 34–46.6)
HGB BLD-MCNC: 8.8 G/DL (ref 12–15.9)
IMM GRANULOCYTES # BLD AUTO: 0.04 10*3/MM3 (ref 0–0.05)
IMM GRANULOCYTES NFR BLD AUTO: 0.3 % (ref 0–0.5)
LYMPHOCYTES # BLD AUTO: 2.34 10*3/MM3 (ref 0.7–3.1)
LYMPHOCYTES NFR BLD AUTO: 20 % (ref 19.6–45.3)
MCH RBC QN AUTO: 26.9 PG (ref 26.6–33)
MCHC RBC AUTO-ENTMCNC: 29.9 G/DL (ref 31.5–35.7)
MCV RBC AUTO: 89.9 FL (ref 79–97)
MONOCYTES # BLD AUTO: 1.46 10*3/MM3 (ref 0.1–0.9)
MONOCYTES NFR BLD AUTO: 12.5 % (ref 5–12)
NEUTROPHILS NFR BLD AUTO: 66 % (ref 42.7–76)
NEUTROPHILS NFR BLD AUTO: 7.71 10*3/MM3 (ref 1.7–7)
NRBC BLD AUTO-RTO: 0 /100 WBC (ref 0–0.2)
PLATELET # BLD AUTO: 222 10*3/MM3 (ref 140–450)
PMV BLD AUTO: 10.3 FL (ref 6–12)
RBC # BLD AUTO: 3.27 10*6/MM3 (ref 3.77–5.28)
WBC # BLD AUTO: 11.69 10*3/MM3 (ref 3.4–10.8)

## 2021-01-07 PROCEDURE — G0378 HOSPITAL OBSERVATION PER HR: HCPCS

## 2021-01-07 PROCEDURE — C1713 ANCHOR/SCREW BN/BN,TIS/BN: HCPCS | Performed by: ORTHOPAEDIC SURGERY

## 2021-01-07 PROCEDURE — A9270 NON-COVERED ITEM OR SERVICE: HCPCS | Performed by: ORTHOPAEDIC SURGERY

## 2021-01-07 PROCEDURE — 25010000002 FENTANYL CITRATE (PF) 100 MCG/2ML SOLUTION: Performed by: ANESTHESIOLOGY

## 2021-01-07 PROCEDURE — 27385 REPAIR OF THIGH MUSCLE: CPT | Performed by: ORTHOPAEDIC SURGERY

## 2021-01-07 PROCEDURE — 25010000002 PROPOFOL 10 MG/ML EMULSION: Performed by: ANESTHESIOLOGY

## 2021-01-07 PROCEDURE — 25010000003 CEFAZOLIN IN DEXTROSE 2-4 GM/100ML-% SOLUTION: Performed by: ORTHOPAEDIC SURGERY

## 2021-01-07 PROCEDURE — 73560 X-RAY EXAM OF KNEE 1 OR 2: CPT

## 2021-01-07 PROCEDURE — 63710000001 DOCUSATE SODIUM 100 MG CAPSULE: Performed by: ORTHOPAEDIC SURGERY

## 2021-01-07 PROCEDURE — 85025 COMPLETE CBC W/AUTO DIFF WBC: CPT | Performed by: ORTHOPAEDIC SURGERY

## 2021-01-07 PROCEDURE — 25010000002 MIDAZOLAM PER 1 MG: Performed by: ANESTHESIOLOGY

## 2021-01-07 PROCEDURE — 25010000002 ROPIVACAINE PER 1 MG: Performed by: NURSE ANESTHETIST, CERTIFIED REGISTERED

## 2021-01-07 PROCEDURE — 25010000002 ROPIVACAINE PER 1 MG: Performed by: ORTHOPAEDIC SURGERY

## 2021-01-07 PROCEDURE — 63710000001 ACETAMINOPHEN 500 MG TABLET: Performed by: ORTHOPAEDIC SURGERY

## 2021-01-07 PROCEDURE — C1776 JOINT DEVICE (IMPLANTABLE): HCPCS | Performed by: ORTHOPAEDIC SURGERY

## 2021-01-07 DEVICE — LEGION PS HIGH FLEX XLPE SZ 1-2 12MM
Type: IMPLANTABLE DEVICE | Site: FEMUR | Status: FUNCTIONAL
Brand: LEGION

## 2021-01-07 DEVICE — IMPLANTABLE DEVICE: Type: IMPLANTABLE DEVICE | Site: FEMUR | Status: FUNCTIONAL

## 2021-01-07 RX ORDER — SODIUM CHLORIDE, SODIUM LACTATE, POTASSIUM CHLORIDE, CALCIUM CHLORIDE 600; 310; 30; 20 MG/100ML; MG/100ML; MG/100ML; MG/100ML
9 INJECTION, SOLUTION INTRAVENOUS CONTINUOUS
Status: DISCONTINUED | OUTPATIENT
Start: 2021-01-07 | End: 2021-01-09 | Stop reason: HOSPADM

## 2021-01-07 RX ORDER — HYDROMORPHONE HYDROCHLORIDE 1 MG/ML
0.5 INJECTION, SOLUTION INTRAMUSCULAR; INTRAVENOUS; SUBCUTANEOUS
Status: DISCONTINUED | OUTPATIENT
Start: 2021-01-07 | End: 2021-01-09 | Stop reason: HOSPADM

## 2021-01-07 RX ORDER — CEFAZOLIN SODIUM 2 G/100ML
2 INJECTION, SOLUTION INTRAVENOUS EVERY 8 HOURS
Status: COMPLETED | OUTPATIENT
Start: 2021-01-08 | End: 2021-01-08

## 2021-01-07 RX ORDER — NALOXONE HCL 0.4 MG/ML
0.1 VIAL (ML) INJECTION
Status: DISCONTINUED | OUTPATIENT
Start: 2021-01-07 | End: 2021-01-09 | Stop reason: HOSPADM

## 2021-01-07 RX ORDER — SODIUM CHLORIDE 9 MG/ML
120 INJECTION, SOLUTION INTRAVENOUS CONTINUOUS
Status: DISCONTINUED | OUTPATIENT
Start: 2021-01-07 | End: 2021-01-09 | Stop reason: HOSPADM

## 2021-01-07 RX ORDER — LIDOCAINE HYDROCHLORIDE 10 MG/ML
0.5 INJECTION, SOLUTION EPIDURAL; INFILTRATION; INTRACAUDAL; PERINEURAL ONCE AS NEEDED
Status: DISCONTINUED | OUTPATIENT
Start: 2021-01-07 | End: 2021-01-07 | Stop reason: HOSPADM

## 2021-01-07 RX ORDER — HYDROMORPHONE HYDROCHLORIDE 1 MG/ML
0.5 INJECTION, SOLUTION INTRAMUSCULAR; INTRAVENOUS; SUBCUTANEOUS
Status: DISCONTINUED | OUTPATIENT
Start: 2021-01-07 | End: 2021-01-07 | Stop reason: HOSPADM

## 2021-01-07 RX ORDER — PROPOFOL 10 MG/ML
VIAL (ML) INTRAVENOUS CONTINUOUS PRN
Status: DISCONTINUED | OUTPATIENT
Start: 2021-01-07 | End: 2021-01-07 | Stop reason: SURG

## 2021-01-07 RX ORDER — SODIUM CHLORIDE 0.9 % (FLUSH) 0.9 %
10 SYRINGE (ML) INJECTION EVERY 12 HOURS SCHEDULED
Status: DISCONTINUED | OUTPATIENT
Start: 2021-01-07 | End: 2021-01-07 | Stop reason: HOSPADM

## 2021-01-07 RX ORDER — FENTANYL CITRATE 50 UG/ML
INJECTION, SOLUTION INTRAMUSCULAR; INTRAVENOUS AS NEEDED
Status: DISCONTINUED | OUTPATIENT
Start: 2021-01-07 | End: 2021-01-07 | Stop reason: SURG

## 2021-01-07 RX ORDER — SODIUM CHLORIDE, SODIUM LACTATE, POTASSIUM CHLORIDE, CALCIUM CHLORIDE 600; 310; 30; 20 MG/100ML; MG/100ML; MG/100ML; MG/100ML
INJECTION, SOLUTION INTRAVENOUS CONTINUOUS PRN
Status: DISCONTINUED | OUTPATIENT
Start: 2021-01-07 | End: 2021-01-07 | Stop reason: SURG

## 2021-01-07 RX ORDER — BUPIVACAINE HYDROCHLORIDE 2.5 MG/ML
INJECTION, SOLUTION EPIDURAL; INFILTRATION; INTRACAUDAL
Status: COMPLETED | OUTPATIENT
Start: 2021-01-07 | End: 2021-01-07

## 2021-01-07 RX ORDER — MAGNESIUM HYDROXIDE 1200 MG/15ML
LIQUID ORAL AS NEEDED
Status: DISCONTINUED | OUTPATIENT
Start: 2021-01-07 | End: 2021-01-07 | Stop reason: HOSPADM

## 2021-01-07 RX ORDER — SODIUM CHLORIDE 0.9 % (FLUSH) 0.9 %
3 SYRINGE (ML) INJECTION EVERY 12 HOURS SCHEDULED
Status: DISCONTINUED | OUTPATIENT
Start: 2021-01-07 | End: 2021-01-09 | Stop reason: HOSPADM

## 2021-01-07 RX ORDER — FENTANYL CITRATE 50 UG/ML
50 INJECTION, SOLUTION INTRAMUSCULAR; INTRAVENOUS
Status: DISCONTINUED | OUTPATIENT
Start: 2021-01-07 | End: 2021-01-07 | Stop reason: HOSPADM

## 2021-01-07 RX ORDER — MIDAZOLAM HYDROCHLORIDE 1 MG/ML
INJECTION INTRAMUSCULAR; INTRAVENOUS AS NEEDED
Status: DISCONTINUED | OUTPATIENT
Start: 2021-01-07 | End: 2021-01-07 | Stop reason: SURG

## 2021-01-07 RX ORDER — ONDANSETRON 2 MG/ML
4 INJECTION INTRAMUSCULAR; INTRAVENOUS EVERY 6 HOURS PRN
Status: DISCONTINUED | OUTPATIENT
Start: 2021-01-07 | End: 2021-01-09 | Stop reason: HOSPADM

## 2021-01-07 RX ORDER — SODIUM CHLORIDE 0.9 % (FLUSH) 0.9 %
1-10 SYRINGE (ML) INJECTION AS NEEDED
Status: DISCONTINUED | OUTPATIENT
Start: 2021-01-07 | End: 2021-01-09 | Stop reason: HOSPADM

## 2021-01-07 RX ORDER — FAMOTIDINE 10 MG/ML
20 INJECTION, SOLUTION INTRAVENOUS ONCE
Status: COMPLETED | OUTPATIENT
Start: 2021-01-07 | End: 2021-01-07

## 2021-01-07 RX ORDER — ASPIRIN 325 MG
325 TABLET, DELAYED RELEASE (ENTERIC COATED) ORAL DAILY
Status: DISCONTINUED | OUTPATIENT
Start: 2021-01-08 | End: 2021-01-07 | Stop reason: SDUPTHER

## 2021-01-07 RX ORDER — SODIUM CHLORIDE 0.9 % (FLUSH) 0.9 %
10 SYRINGE (ML) INJECTION AS NEEDED
Status: DISCONTINUED | OUTPATIENT
Start: 2021-01-07 | End: 2021-01-07 | Stop reason: HOSPADM

## 2021-01-07 RX ORDER — BUPIVACAINE HYDROCHLORIDE 5 MG/ML
INJECTION, SOLUTION PERINEURAL
Status: COMPLETED | OUTPATIENT
Start: 2021-01-07 | End: 2021-01-07

## 2021-01-07 RX ORDER — ONDANSETRON 4 MG/1
4 TABLET, FILM COATED ORAL EVERY 6 HOURS PRN
Status: DISCONTINUED | OUTPATIENT
Start: 2021-01-07 | End: 2021-01-09 | Stop reason: HOSPADM

## 2021-01-07 RX ADMIN — OXYCODONE 5 MG: 5 TABLET ORAL at 11:40

## 2021-01-07 RX ADMIN — BUPIVACAINE HYDROCHLORIDE 2 ML: 5 INJECTION, SOLUTION PERINEURAL at 18:43

## 2021-01-07 RX ADMIN — DOCUSATE SODIUM 100 MG: 100 CAPSULE ORAL at 21:42

## 2021-01-07 RX ADMIN — SODIUM CHLORIDE 120 ML/HR: 9 INJECTION, SOLUTION INTRAVENOUS at 21:42

## 2021-01-07 RX ADMIN — ACETAMINOPHEN 1000 MG: 500 TABLET ORAL at 23:16

## 2021-01-07 RX ADMIN — BUPIVACAINE HYDROCHLORIDE 20 ML: 2.5 INJECTION, SOLUTION EPIDURAL; INFILTRATION; INTRACAUDAL; PERINEURAL at 15:22

## 2021-01-07 RX ADMIN — OXYCODONE 5 MG: 5 TABLET ORAL at 04:15

## 2021-01-07 RX ADMIN — MIDAZOLAM 2 MG: 1 INJECTION INTRAMUSCULAR; INTRAVENOUS at 18:35

## 2021-01-07 RX ADMIN — FENTANYL CITRATE 100 MCG: 50 INJECTION, SOLUTION INTRAMUSCULAR; INTRAVENOUS at 18:35

## 2021-01-07 RX ADMIN — SODIUM CHLORIDE, PRESERVATIVE FREE 10 ML: 5 INJECTION INTRAVENOUS at 08:54

## 2021-01-07 RX ADMIN — FAMOTIDINE 20 MG: 10 INJECTION, SOLUTION INTRAVENOUS at 16:33

## 2021-01-07 RX ADMIN — Medication 1000 MG: at 18:54

## 2021-01-07 RX ADMIN — SODIUM CHLORIDE, POTASSIUM CHLORIDE, SODIUM LACTATE AND CALCIUM CHLORIDE 9 ML/HR: 600; 310; 30; 20 INJECTION, SOLUTION INTRAVENOUS at 16:32

## 2021-01-07 RX ADMIN — Medication 1000 MG: at 19:40

## 2021-01-07 RX ADMIN — SODIUM CHLORIDE, POTASSIUM CHLORIDE, SODIUM LACTATE AND CALCIUM CHLORIDE: 600; 310; 30; 20 INJECTION, SOLUTION INTRAVENOUS at 18:16

## 2021-01-07 RX ADMIN — CEFAZOLIN SODIUM 2 G: 2 INJECTION, SOLUTION INTRAVENOUS at 18:39

## 2021-01-07 RX ADMIN — ROPIVACAINE HYDROCHLORIDE 8 ML/HR: 5 INJECTION, SOLUTION EPIDURAL; INFILTRATION; PERINEURAL at 20:23

## 2021-01-07 RX ADMIN — PROPOFOL 100 MCG/KG/MIN: 10 INJECTION, EMULSION INTRAVENOUS at 18:40

## 2021-01-07 NOTE — ANESTHESIA PREPROCEDURE EVALUATION
Anesthesia Evaluation                  Airway   Mallampati: I  TM distance: >3 FB  Neck ROM: full  No difficulty expected  Dental      Pulmonary    (+) asthma,  Cardiovascular     ECG reviewed    (+) hypertension,       Neuro/Psych  GI/Hepatic/Renal/Endo    (+)  hiatal hernia, GERD,      Musculoskeletal     Abdominal    Substance History      OB/GYN          Other   arthritis,    history of cancer                    Anesthesia Plan    ASA 3     spinal     intravenous induction     Anesthetic plan, all risks, benefits, and alternatives have been provided, discussed and informed consent has been obtained with: patient.    Plan discussed with CRNA.

## 2021-01-07 NOTE — ANESTHESIA PROCEDURE NOTES
Peripheral Block      Patient reassessed immediately prior to procedure    Patient location during procedure: OR  Start time: 1/7/2021 3:14 PM  Stop time: 1/7/2021 3:24 PM  Reason for block: post-op pain management  Performed by  CRNA: Markel Stewart, CRNA  Assisted by: Mckenzie Sears RN  Preanesthetic Checklist  Completed: patient identified, site marked, surgical consent, pre-op evaluation, timeout performed, IV checked and monitors and equipment checked  Prep:  Pt Position: supine  Sterile barriers:gloves, cap, sterile barriers and mask  Prep: ChloraPrep  Patient monitoring: blood pressure monitoring, continuous pulse oximetry and EKG  Procedure  Sedation:no  Performed under: local infiltration  Guidance:ultrasound guided, nerve stimulator and landmark technique  Images:still images not obtained    Laterality:right  Block Type:femoral  Injection Technique:catheter  Needle Type:echogenic and Tuohy  Needle Gauge:18 G  Resistance on Injection: none  Catheter Size:20 G  Cath Depth at skin: 11 cm    Medications Used: bupivacaine PF (MARCAINE) 0.25 % injection, 20 mL  Med admintered at 1/7/2021 3:22 PM      Medications  Preservative Free Saline:5ml    Post Assessment  Injection Assessment: negative aspiration for heme, no paresthesia on injection and incremental injection  Patient Tolerance:comfortable throughout block  Complications:no  Additional Notes  The BBRaun 360 degree echogenic needle was introduced in plane, in a lateral to medial direction at the level of the inguinal crease.  Under ultrasound guidance, the femoral artery and vein where located.  The needle was then directed below Fascia Iliacus towards the Femoral nerve.  NS was utilized to hydro dissect and bishop needle advancement towards the target structure.   LA was injected incrementally in 3-5 ml aliquots with negative aspirate.  LA spread was visualized around the nerve, negative intraneural injection, low injection pressures.  The catheter was  placed without difficulty and secured in the normal fashion. Thank you

## 2021-01-07 NOTE — ANESTHESIA PROCEDURE NOTES
Spinal Block    Pre-sedation assessment completed: 1/7/2021 6:35 PM    Patient reassessed immediately prior to procedure    Patient location during procedure: OR  Start Time: 1/7/2021 6:35 PM  Stop Time: 1/7/2021 6:38 PM  Indication:at surgeon's request  Performed By  Anesthesiologist: Bayron Peraza MD  Preanesthetic Checklist  Completed: patient identified, site marked, surgical consent, pre-op evaluation, timeout performed, IV checked, risks and benefits discussed and monitors and equipment checked  Spinal Block Prep:  Patient Position:sitting  Sterile Tech:cap, gloves, sterile barriers and mask  Prep:Chloraprep  Patient Monitoring:blood pressure monitoring, continuous pulse oximetry and EKG  Spinal Block Procedure  Approach:midline  Guidance:landmark technique and palpation technique  Location:L4-L5  Needle Type:Sprotte  Needle Gauge:25 G  Placement of Spinal needle event:cerebrospinal fluid aspirated  Paresthesia: no  Fluid Appearance:clear  Medications: bupivacaine (MARCAINE) 0.5 % injection, 2 mL   Post Assessment  Patient Tolerance:patient tolerated the procedure well with no apparent complications  Complications no  Additional Notes  Procedure:  Pt assisted to sitting position, with legs in position of comfort over side of bed.  Pt. instructed in optimal spine presentation, the spine was prepped/ Draped and the skin at insertion site was anesthetized with 1% Lidocaine 2 ml.  The spinal needle was then advanced until CSF flow was obtained and LA was injected:

## 2021-01-08 LAB
DEPRECATED RDW RBC AUTO: 51.5 FL (ref 37–54)
ERYTHROCYTE [DISTWIDTH] IN BLOOD BY AUTOMATED COUNT: 15.9 % (ref 12.3–15.4)
HCT VFR BLD AUTO: 27.8 % (ref 34–46.6)
HGB BLD-MCNC: 8.5 G/DL (ref 12–15.9)
MCH RBC QN AUTO: 26.8 PG (ref 26.6–33)
MCHC RBC AUTO-ENTMCNC: 30.6 G/DL (ref 31.5–35.7)
MCV RBC AUTO: 87.7 FL (ref 79–97)
PLATELET # BLD AUTO: 238 10*3/MM3 (ref 140–450)
PMV BLD AUTO: 10 FL (ref 6–12)
RBC # BLD AUTO: 3.17 10*6/MM3 (ref 3.77–5.28)
WBC # BLD AUTO: 10.01 10*3/MM3 (ref 3.4–10.8)

## 2021-01-08 PROCEDURE — 97530 THERAPEUTIC ACTIVITIES: CPT

## 2021-01-08 PROCEDURE — 97116 GAIT TRAINING THERAPY: CPT

## 2021-01-08 PROCEDURE — 63710000001 MELOXICAM 7.5 MG TABLET: Performed by: ORTHOPAEDIC SURGERY

## 2021-01-08 PROCEDURE — 63710000001 ACETAMINOPHEN 500 MG TABLET: Performed by: ORTHOPAEDIC SURGERY

## 2021-01-08 PROCEDURE — 63710000001 OXYCODONE 5 MG TABLET: Performed by: ORTHOPAEDIC SURGERY

## 2021-01-08 PROCEDURE — 63710000001 PANTOPRAZOLE 40 MG TABLET DELAYED-RELEASE: Performed by: ORTHOPAEDIC SURGERY

## 2021-01-08 PROCEDURE — 99024 POSTOP FOLLOW-UP VISIT: CPT | Performed by: ORTHOPAEDIC SURGERY

## 2021-01-08 PROCEDURE — A9270 NON-COVERED ITEM OR SERVICE: HCPCS | Performed by: ORTHOPAEDIC SURGERY

## 2021-01-08 PROCEDURE — 63710000001 DOCUSATE SODIUM 100 MG CAPSULE: Performed by: ORTHOPAEDIC SURGERY

## 2021-01-08 PROCEDURE — G0378 HOSPITAL OBSERVATION PER HR: HCPCS

## 2021-01-08 PROCEDURE — 25010000002 CEFAZOLIN PER 500 MG: Performed by: ORTHOPAEDIC SURGERY

## 2021-01-08 PROCEDURE — 85027 COMPLETE CBC AUTOMATED: CPT | Performed by: ORTHOPAEDIC SURGERY

## 2021-01-08 PROCEDURE — 97161 PT EVAL LOW COMPLEX 20 MIN: CPT

## 2021-01-08 PROCEDURE — 63710000001 ASPIRIN EC 325 MG TABLET DELAYED-RELEASE: Performed by: ORTHOPAEDIC SURGERY

## 2021-01-08 RX ADMIN — MELOXICAM 15 MG: 7.5 TABLET ORAL at 08:53

## 2021-01-08 RX ADMIN — DOCUSATE SODIUM 100 MG: 100 CAPSULE ORAL at 08:53

## 2021-01-08 RX ADMIN — CEFAZOLIN 2 G: 10 INJECTION, POWDER, FOR SOLUTION INTRAVENOUS at 09:05

## 2021-01-08 RX ADMIN — SODIUM CHLORIDE 120 ML/HR: 9 INJECTION, SOLUTION INTRAVENOUS at 07:32

## 2021-01-08 RX ADMIN — SODIUM CHLORIDE, PRESERVATIVE FREE 10 ML: 5 INJECTION INTRAVENOUS at 08:53

## 2021-01-08 RX ADMIN — ACETAMINOPHEN 1000 MG: 500 TABLET ORAL at 12:42

## 2021-01-08 RX ADMIN — OXYCODONE 5 MG: 5 TABLET ORAL at 17:20

## 2021-01-08 RX ADMIN — CEFAZOLIN 2 G: 10 INJECTION, POWDER, FOR SOLUTION INTRAVENOUS at 02:53

## 2021-01-08 RX ADMIN — SODIUM CHLORIDE, PRESERVATIVE FREE 10 ML: 5 INJECTION INTRAVENOUS at 20:14

## 2021-01-08 RX ADMIN — PANTOPRAZOLE SODIUM 40 MG: 40 TABLET, DELAYED RELEASE ORAL at 05:25

## 2021-01-08 RX ADMIN — ACETAMINOPHEN 1000 MG: 500 TABLET ORAL at 05:25

## 2021-01-08 RX ADMIN — SODIUM CHLORIDE, PRESERVATIVE FREE 3 ML: 5 INJECTION INTRAVENOUS at 20:14

## 2021-01-08 RX ADMIN — ASPIRIN 325 MG: 325 TABLET, COATED ORAL at 08:53

## 2021-01-08 NOTE — ANESTHESIA POSTPROCEDURE EVALUATION
Patient: Lilia Murcia    Procedure Summary     Date: 01/07/21 Room / Location:  [facility] /  [facility]    Anesthesia Start: 1829 Anesthesia Stop:     Procedure: QUADRICEPS TENDON REPAIR (Right Thigh) Diagnosis:       Rupture of right quadriceps tendon, initial encounter      (Rupture of right quadriceps tendon, initial encounter [S76.111A])    Surgeon: Jeffrey Sutton MD Provider: Bayron Peraza MD    Anesthesia Type: spinal ASA Status: 3          Anesthesia Type: spinal    Vitals  No vitals data found for the desired time range.          Post Anesthesia Care and Evaluation    Patient location during evaluation: PACU  Patient participation: complete - patient participated  Level of consciousness: awake and alert  Pain management: adequate  Airway patency: patent  Anesthetic complications: No anesthetic complications  PONV Status: none  Cardiovascular status: hemodynamically stable and acceptable  Respiratory status: nonlabored ventilation, acceptable and nasal cannula  Hydration status: acceptable

## 2021-01-09 VITALS
OXYGEN SATURATION: 92 % | BODY MASS INDEX: 30.96 KG/M2 | RESPIRATION RATE: 17 BRPM | HEIGHT: 61 IN | SYSTOLIC BLOOD PRESSURE: 144 MMHG | HEART RATE: 79 BPM | TEMPERATURE: 97.9 F | WEIGHT: 164 LBS | DIASTOLIC BLOOD PRESSURE: 69 MMHG

## 2021-01-09 LAB
DEPRECATED RDW RBC AUTO: 52.1 FL (ref 37–54)
ERYTHROCYTE [DISTWIDTH] IN BLOOD BY AUTOMATED COUNT: 15.9 % (ref 12.3–15.4)
HCT VFR BLD AUTO: 26.6 % (ref 34–46.6)
HGB BLD-MCNC: 8.1 G/DL (ref 12–15.9)
MCH RBC QN AUTO: 27.2 PG (ref 26.6–33)
MCHC RBC AUTO-ENTMCNC: 30.5 G/DL (ref 31.5–35.7)
MCV RBC AUTO: 89.3 FL (ref 79–97)
PLATELET # BLD AUTO: 214 10*3/MM3 (ref 140–450)
PMV BLD AUTO: 9.7 FL (ref 6–12)
RBC # BLD AUTO: 2.98 10*6/MM3 (ref 3.77–5.28)
WBC # BLD AUTO: 7.79 10*3/MM3 (ref 3.4–10.8)

## 2021-01-09 PROCEDURE — A9270 NON-COVERED ITEM OR SERVICE: HCPCS | Performed by: ORTHOPAEDIC SURGERY

## 2021-01-09 PROCEDURE — 63710000001 DOCUSATE SODIUM 100 MG CAPSULE: Performed by: ORTHOPAEDIC SURGERY

## 2021-01-09 PROCEDURE — G0378 HOSPITAL OBSERVATION PER HR: HCPCS

## 2021-01-09 PROCEDURE — 63710000001 OXYCODONE 5 MG TABLET: Performed by: ORTHOPAEDIC SURGERY

## 2021-01-09 PROCEDURE — 97116 GAIT TRAINING THERAPY: CPT | Performed by: PHYSICAL THERAPIST

## 2021-01-09 PROCEDURE — 97530 THERAPEUTIC ACTIVITIES: CPT | Performed by: PHYSICAL THERAPIST

## 2021-01-09 PROCEDURE — 85027 COMPLETE CBC AUTOMATED: CPT | Performed by: ORTHOPAEDIC SURGERY

## 2021-01-09 PROCEDURE — 99024 POSTOP FOLLOW-UP VISIT: CPT | Performed by: ORTHOPAEDIC SURGERY

## 2021-01-09 PROCEDURE — 63710000001 PANTOPRAZOLE 40 MG TABLET DELAYED-RELEASE: Performed by: ORTHOPAEDIC SURGERY

## 2021-01-09 PROCEDURE — 63710000001 MELOXICAM 7.5 MG TABLET: Performed by: ORTHOPAEDIC SURGERY

## 2021-01-09 PROCEDURE — 63710000001 ACETAMINOPHEN 500 MG TABLET: Performed by: ORTHOPAEDIC SURGERY

## 2021-01-09 PROCEDURE — 63710000001 ASPIRIN EC 325 MG TABLET DELAYED-RELEASE: Performed by: ORTHOPAEDIC SURGERY

## 2021-01-09 RX ADMIN — ACETAMINOPHEN 1000 MG: 500 TABLET ORAL at 05:14

## 2021-01-09 RX ADMIN — ASPIRIN 325 MG: 325 TABLET, COATED ORAL at 08:36

## 2021-01-09 RX ADMIN — SODIUM CHLORIDE 120 ML/HR: 9 INJECTION, SOLUTION INTRAVENOUS at 05:15

## 2021-01-09 RX ADMIN — OXYCODONE 5 MG: 5 TABLET ORAL at 00:25

## 2021-01-09 RX ADMIN — ACETAMINOPHEN 1000 MG: 500 TABLET ORAL at 00:25

## 2021-01-09 RX ADMIN — PANTOPRAZOLE SODIUM 40 MG: 40 TABLET, DELAYED RELEASE ORAL at 05:15

## 2021-01-09 RX ADMIN — OXYCODONE 5 MG: 5 TABLET ORAL at 13:45

## 2021-01-09 RX ADMIN — ACETAMINOPHEN 1000 MG: 500 TABLET ORAL at 11:49

## 2021-01-09 RX ADMIN — MELOXICAM 15 MG: 7.5 TABLET ORAL at 08:36

## 2021-01-09 RX ADMIN — DOCUSATE SODIUM 100 MG: 100 CAPSULE ORAL at 08:36

## 2021-01-11 ENCOUNTER — TELEPHONE (OUTPATIENT)
Dept: ORTHOPEDIC SURGERY | Facility: CLINIC | Age: 76
End: 2021-01-11

## 2021-01-13 ENCOUNTER — TELEPHONE (OUTPATIENT)
Dept: ORTHOPEDIC SURGERY | Facility: CLINIC | Age: 76
End: 2021-01-13

## 2021-01-13 NOTE — TELEPHONE ENCOUNTER
PATIENT CALLED STATING THAT WAS SHE WAS HAVING SOME PAIN IN HER LEFT FOOT AND WOULD LIKE TO KNOW IF THERE'S ANYTHING SHE CAN BE TAKING. PATIENT MAY BE REACHED [phone].

## 2021-01-13 NOTE — TELEPHONE ENCOUNTER
I called patient, she is having some itching on the right leg. I suggested she take benadryl for this and call us if she has any other questions or concerns.  Izabela   DC instructions/by MD

## 2021-01-15 ENCOUNTER — TELEPHONE (OUTPATIENT)
Dept: ORTHOPEDIC SURGERY | Facility: CLINIC | Age: 76
End: 2021-01-15

## 2021-01-15 NOTE — TELEPHONE ENCOUNTER
PATIENT IS REQUESTING A CALL BACK REGARDING HER POST-OP THERAPY. SHE SAW AN OCCUPATIONAL THERAPIST AND SHE WAS TOLD THAT SHE WILL HAVE A PHYSICAL THERAPIST NEXT WEEK. SHE WOULD LIKE TO KNOW IF SHE IS SUPPOSED TO HAVE A PHYSICAL THERAPIST BECAUSE SHE WAS TOLD SHE WAS NOT SUPPOSED TO DO ANYTHING WITH HER KNEE YET BECAUSE SHE IS IN AN IMMOBILIZER. SHE WOULD LIKE TO MAKE SURE THAT PHYSICAL THERAPY HAS BEEN IN COMMUNICATION WITH DR. ANDERSON ABOUT WHAT SHE CAN OR CANNOT DO WITH HER KNEE. SHE WOULD LIKE A CALL BACK [phone].

## 2021-01-15 NOTE — TELEPHONE ENCOUNTER
Dr. Sutton,    Please see below.  I don't see in the OP note about beginning PT.  Should the patient be starting PT while still in the immobilizer?    Thanks,    Tonio

## 2021-01-18 NOTE — TELEPHONE ENCOUNTER
I called the patient and told her no motion of the knee at all.  She said PT is working with her on keeping the other knee strong and some abdominal exercises.  I told her these are fine as long as the affected knee is not being used in any way.  Patient understood.

## 2021-02-01 ENCOUNTER — OFFICE VISIT (OUTPATIENT)
Dept: ORTHOPEDIC SURGERY | Facility: CLINIC | Age: 76
End: 2021-02-01

## 2021-02-01 DIAGNOSIS — Z96.651 STATUS POST TOTAL KNEE REPLACEMENT, RIGHT: Primary | ICD-10-CM

## 2021-02-01 DIAGNOSIS — Z47.89 ORTHOPEDIC AFTERCARE: ICD-10-CM

## 2021-02-01 PROCEDURE — 99024 POSTOP FOLLOW-UP VISIT: CPT | Performed by: ORTHOPAEDIC SURGERY

## 2021-02-01 NOTE — PROGRESS NOTES
[facility] [place] Orthopaedic Surgery Clinic Note    Subjective     Chief Complaint   Patient presents with   • Post-op     4 weeks status post total knee replacement, right 01/05/21 and quadriceps tendon repair, right 01/07/2021        HPI    It has been 4  week(s) since Ms. Murcia's last visit. She returns to clinic today for postoperative follow-up of right knee arthroplasty. She rates her pain a 0/10 on the pain scale. Previous/current treatments: bracing and cane/walker; ice improve the pain. Overall, she is doing better.  No new complaints today.    I have reviewed the following portions of the patient's history and agree with: History of Present Illness and Review of Systems    Patient Active Problem List   Diagnosis   • Primary osteoarthritis of left knee   • Primary osteoarthritis of right knee   • Status post total left knee replacement   • HTN (hypertension)   • Prediabetes   • Leukocytosis, likely reactive   • Acute blood loss anemia, mild, likely reactive   • Acute postoperative pain   • Degenerative arthritis of right knee   • S/P total knee arthroplasty, right   • Acute postoperative pain of right knee   • Quadriceps tendon rupture status post repair   • Rupture of right quadriceps tendon     Past Medical History:   Diagnosis Date   • Arthritis    • Asthma    • GERD (gastroesophageal reflux disease)    • Hiatal hernia     still present    • Hyperlipidemia    • Hypertension    • Skin cancer     basal cell still present on top of head   • Wears glasses       Past Surgical History:   Procedure Laterality Date   • BREAST EXCISIONAL BIOPSY Right 11/12/2013   • CATARACT EXTRACTION      bilat    • CHOLECYSTECTOMY     • COLONOSCOPY     • ENDOSCOPY     • HYSTERECTOMY      total    • INJECTION OF MEDICATION      x5 bilat knee cortisone    • OOPHORECTOMY     • QUADRICEPS TENDON REPAIR Right 1/7/2021    Procedure: QUADRICEPS TENDON REPAIR RIGHT;  Surgeon: Jeffrey Sutton MD;  Location: [facility];  Service: Orthopedics;   Laterality: Right;   • SKIN BIOPSY     • TOTAL KNEE ARTHROPLASTY Left 9/10/2019    Procedure: TOTAL KNEE LEFT ARTHROPLASTY;  Surgeon: Jeffrey Sutton MD;  Location:  [facility];  Service: Orthopedics   • TOTAL KNEE ARTHROPLASTY Right 1/5/2021    Procedure: TOTAL KNEE ARTHROPLASTY RIGHT;  Surgeon: Jeffrey Sutton MD;  Location:  [facility];  Service: Orthopedics;  Laterality: Right;      Family History   Problem Relation Age of Onset   • Breast cancer Neg Hx    • Ovarian cancer Neg Hx      Social History     Socioeconomic History   • Marital status:      Spouse name: Not on file   • Number of children: Not on file   • Years of education: Not on file   • Highest education level: Not on file   Tobacco Use   • Smoking status: Never Smoker   • Smokeless tobacco: Never Used   Substance and Sexual Activity   • Alcohol use: No   • Drug use: No   • Sexual activity: Defer      Current Outpatient Medications on File Prior to Visit   Medication Sig Dispense Refill   • albuterol sulfate  (90 Base) MCG/ACT inhaler Inhale 2 puffs Every 6 (Six) Hours As Needed for Wheezing.     • [START ON 2/5/2021] aspirin 81 MG chewable tablet Chew 1 tablet Daily. Resume in 1 month     • aspirin EC (aspirin) 325 MG tablet Take 1 tablet by mouth Daily for 30 days. 30 tablet 0   • Cholecalciferol (VITAMIN D PO) Take 2,000 mg by mouth Daily.     • hydrochlorothiazide (HYDRODIURIL) 25 MG tablet Take 25 mg by mouth Daily.     • omeprazole (priLOSEC) 20 MG capsule Take 20 mg by mouth Daily As Needed.     • oxyCODONE (Roxicodone) 5 MG immediate release tablet Take 1 tablet by mouth Every 4 (Four) Hours As Needed for Moderate Pain . 40 tablet 0   • quinapril (ACCUPRIL) 40 MG tablet Take 40 mg by mouth Daily.     • Ropivacine HCl-NaCl (NAROPIN) 20 mg/hr by Peripheral Nerve route Continuous. Indications: Acute Pain       No current facility-administered medications on file prior to visit.       Allergies   Allergen Reactions   • Sulfa  Antibiotics Rash and Unknown (See Comments)     May or may not be a reaction. Has been years since a reaction.         Review of Systems     Objective      Physical Exam  There were no vitals taken for this visit.    There is no height or weight on file to calculate BMI.    General:   Mental Status:  Alert   Appearance: Cooperative, in no acute distress   Build and Nutrition: Well-nourished well-developed female   Orientation: Alert and oriented to person, place and time   Posture: Normal   Gait: With a walker and knee immobilizer    Integument:   Right knee: Wound is well-healed with no signs of infection    Lower Extremities:   Right Knee:    Tenderness:  None    Effusion:  None    Swelling: None    Crepitus:  None    Straight leg raise intact  Instability:  No varus laxity, no valgus laxity, negative anterior drawer  Deformities:  None      Imaging/Studies  Imaging Results (Last 24 Hours)     Procedure Component Value Units Date/Time    XR Knee 1 or 2 View Right [[id number]] Resulted: 02/01/21 1144     Updated: 02/01/21 1145    Narrative:      Right Knee Radiographs  Indication: status-post right total knee arthroplasty and quadriceps   tendon repair  Views: AP, lateral views of the right knee    Comparison: no change compared to prior study, 1/7/2021    Findings:   The components are well aligned, with no signs of loosening or failure.    Patella is in good position and alignment.            Assessment and Plan     Diagnoses and all orders for this visit:    1. Status post total knee replacement, right (Primary)  -     XR Knee 1 or 2 View Right    2. Orthopedic aftercare        1. Status post total knee replacement, right    2. Orthopedic aftercare        I reviewed my findings with the patient today.  She is doing well, and she was placed into a T ROM brace today, locked in extension with weightbearing, but otherwise she may be hinged at 0 to 30 degrees when sitting.  I will see her back in 2 weeks, at which  point we may advance her range of motion, but continue the brace.  I will see her back sooner for any problems.    Return in about 2 weeks (around 2/15/2021).    Medical Decision Making  Data/Risk: radiology tests and independent visualization of imaging, lab tests, or EMG/NCV    Jeffrey Sutton MD  02/01/21  12:13 HERI Bolanos disclaimer:  Much of this encounter note is an electronic transcription/translation of spoken language to printed text. The electronic translation of spoken language may permit erroneous, or at times, nonsensical words or phrases to be inadvertently transcribed; Although I have reviewed the note for such errors, some may still exist.

## 2021-02-02 ENCOUNTER — TELEPHONE (OUTPATIENT)
Dept: ORTHOPEDIC SURGERY | Facility: CLINIC | Age: 76
End: 2021-02-02

## 2021-02-02 NOTE — TELEPHONE ENCOUNTER
PATIENT CALLED STATING THAT HER PHYSICAL THERAPIST MAY HAVE CHANGED THE SETTINGS IN HER BRACE BECAUSE SHE STATES IT LOOKS DIFFERENT COMPARED TO WHEN SHE WAS IN THE OFFICE WHEN SHE FIRST GOT IT ON. SHE IS AFRAID TO STAND DUE TO THE POSSIBILITY OF IT NOT BEING SECURE. PATIENT IS HOPING TO SPEAK WITH SOMEONE REGARDING THIS. PLEASE ADVISE. PATIENT MAY BE REACHED [phone].

## 2021-02-15 ENCOUNTER — OFFICE VISIT (OUTPATIENT)
Dept: ORTHOPEDIC SURGERY | Facility: CLINIC | Age: 76
End: 2021-02-15

## 2021-02-15 DIAGNOSIS — Z96.651 STATUS POST TOTAL KNEE REPLACEMENT, RIGHT: Primary | ICD-10-CM

## 2021-02-15 DIAGNOSIS — Z47.89 ORTHOPEDIC AFTERCARE: ICD-10-CM

## 2021-02-15 PROCEDURE — 99024 POSTOP FOLLOW-UP VISIT: CPT | Performed by: ORTHOPAEDIC SURGERY

## 2021-02-15 NOTE — PROGRESS NOTES
[facility] [place] Orthopaedic Surgery Clinic Note    Subjective     Chief Complaint   Patient presents with   • Post-op     2 week recheck- 6 weeks status post total knee replacement, right 01/05/21 and quadriceps tendon repair, right 01/07/2021        HPI    Lilia Murcia is a 75 y.o. female who presents for follow-up status post right total knee arthroplasty, on 10/05/2020, with subsequent quadriceps tendon repair on 01/07/2021 after she fell postoperatively. She is here today for routine follow-up. Ms. Murcia reports that it is difficult to say whether or not she feels improvement from the surgery because she has been keeping her knee in extension. She states that she experiences stress on the medial side of her knee when trying to hold her knee up on its own. She reports that she feels she has good strength over the posterior aspect of her right leg.      I have reviewed the following portions of the patient's history and agree with: History of Present Illness and Review of Systems    Patient Active Problem List   Diagnosis   • Primary osteoarthritis of left knee   • Primary osteoarthritis of right knee   • Status post total left knee replacement   • HTN (hypertension)   • Prediabetes   • Leukocytosis, likely reactive   • Acute blood loss anemia, mild, likely reactive   • Acute postoperative pain   • Degenerative arthritis of right knee   • S/P total knee arthroplasty, right   • Acute postoperative pain of right knee   • Quadriceps tendon rupture status post repair   • Rupture of right quadriceps tendon     Past Medical History:   Diagnosis Date   • Arthritis    • Asthma    • GERD (gastroesophageal reflux disease)    • Hiatal hernia     still present    • Hyperlipidemia    • Hypertension    • Skin cancer     basal cell still present on top of head   • Wears glasses       Past Surgical History:   Procedure Laterality Date   • BREAST EXCISIONAL BIOPSY Right 11/12/2013   • CATARACT EXTRACTION      bilat    •  CHOLECYSTECTOMY     • COLONOSCOPY     • ENDOSCOPY     • HYSTERECTOMY      total    • INJECTION OF MEDICATION      x5 bilat knee cortisone    • OOPHORECTOMY     • QUADRICEPS TENDON REPAIR Right 1/7/2021    Procedure: QUADRICEPS TENDON REPAIR RIGHT;  Surgeon: Jeffrey Sutton MD;  Location:  [facility];  Service: Orthopedics;  Laterality: Right;   • SKIN BIOPSY     • TOTAL KNEE ARTHROPLASTY Left 9/10/2019    Procedure: TOTAL KNEE LEFT ARTHROPLASTY;  Surgeon: Jeffrey Sutton MD;  Location:  [facility];  Service: Orthopedics   • TOTAL KNEE ARTHROPLASTY Right 1/5/2021    Procedure: TOTAL KNEE ARTHROPLASTY RIGHT;  Surgeon: Jeffrey Sutton MD;  Location:  [facility];  Service: Orthopedics;  Laterality: Right;      Family History   Problem Relation Age of Onset   • Breast cancer Neg Hx    • Ovarian cancer Neg Hx      Social History     Socioeconomic History   • Marital status:      Spouse name: Not on file   • Number of children: Not on file   • Years of education: Not on file   • Highest education level: Not on file   Tobacco Use   • Smoking status: Never Smoker   • Smokeless tobacco: Never Used   Substance and Sexual Activity   • Alcohol use: No   • Drug use: No   • Sexual activity: Defer      Current Outpatient Medications on File Prior to Visit   Medication Sig Dispense Refill   • albuterol sulfate  (90 Base) MCG/ACT inhaler Inhale 2 puffs Every 6 (Six) Hours As Needed for Wheezing.     • aspirin 81 MG chewable tablet Chew 1 tablet Daily. Resume in 1 month     • Cholecalciferol (VITAMIN D PO) Take 2,000 mg by mouth Daily.     • hydrochlorothiazide (HYDRODIURIL) 25 MG tablet Take 25 mg by mouth Daily.     • omeprazole (priLOSEC) 20 MG capsule Take 20 mg by mouth Daily As Needed.     • quinapril (ACCUPRIL) 40 MG tablet Take 40 mg by mouth Daily.     • oxyCODONE (Roxicodone) 5 MG immediate release tablet Take 1 tablet by mouth Every 4 (Four) Hours As Needed for Moderate Pain . 40 tablet 0   • Ropivacine HCl-NaCl  (NAROPIN) 20 mg/hr by Peripheral Nerve route Continuous. Indications: Acute Pain       No current facility-administered medications on file prior to visit.       Allergies   Allergen Reactions   • Sulfa Antibiotics Rash and Unknown (See Comments)     May or may not be a reaction. Has been years since a reaction.         Review of Systems   Constitutional: Negative for activity change, appetite change, chills, diaphoresis, fatigue, fever and unexpected weight change.   HENT: Negative for congestion, dental problem, drooling, ear discharge, ear pain, facial swelling, hearing loss, mouth sores, nosebleeds, postnasal drip, rhinorrhea, sinus pressure, sneezing, sore throat, tinnitus, trouble swallowing and voice change.    Eyes: Negative for photophobia, pain, discharge, redness, itching and visual disturbance.   Respiratory: Negative for apnea, cough, choking, chest tightness, shortness of breath, wheezing and stridor.    Cardiovascular: Negative for chest pain, palpitations and leg swelling.   Gastrointestinal: Negative for abdominal distention, abdominal pain, anal bleeding, blood in stool, constipation, diarrhea, nausea, rectal pain and vomiting.   Endocrine: Negative for cold intolerance, heat intolerance, polydipsia, polyphagia and polyuria.   Genitourinary: Negative for decreased urine volume, difficulty urinating, dysuria, enuresis, flank pain, frequency, genital sores, hematuria and urgency.   Musculoskeletal: Positive for arthralgias. Negative for back pain, gait problem, joint swelling, myalgias, neck pain and neck stiffness.   Skin: Negative for color change, pallor, rash and wound.   Allergic/Immunologic: Negative for environmental allergies, food allergies and immunocompromised state.   Neurological: Negative for dizziness, tremors, seizures, syncope, facial asymmetry, speech difficulty, weakness, light-headedness, numbness and headaches.   Hematological: Negative for adenopathy. Does not bruise/bleed  easily.   Psychiatric/Behavioral: Negative for agitation, behavioral problems, confusion, decreased concentration, dysphoric mood, hallucinations, self-injury, sleep disturbance and suicidal ideas. The patient is not nervous/anxious and is not hyperactive.         Objective      Physical Exam  There were no vitals taken for this visit.    There is no height or weight on file to calculate BMI.      General:   • Mental Status:  Alert   • Appearance: Cooperative, in no acute distress   • Build and Nutrition: Well-nourished and well-developed female  • Orientation: Alert and oriented to person, place and time   • Posture: Normal   • Gait: She has a TROM brace on. She is ambulating well with the brace and no walker, though she presents with a walker today.    Integument  • Right knee: Wound is well-healed with no signs of infection      Lower Extremities  • Right Knee:       • Tenderness: No medial or lateral joint line tenderness      • Swelling: None       • Effusion: None      • Crepitus: None      • Atrophy: None      • Range of motion:        • Extension: 0°        • Flexion: 35°       • Instability: No varus or valgus laxity. Negative anterior drawer.      • Deformities: None  Straight leg raise intact      Assessment and Plan     Diagnoses and all orders for this visit:    1. Status post total knee replacement, right (Primary)    2. Orthopedic aftercare        1. Status post total knee replacement, right    2. Orthopedic aftercare        Lilia is doing well today. We will increase her permitted hinged knee brace flexion to a maximum of 50 degrees today. I have instructed her on gradually increasing the flexion settings on her knee brace by an additional 10 degrees every few days. I would like for her to reach 90 degrees of flexion over the next 2 weeks. I have encouraged her to continue working on range of motion exercises at home, 4 to 5 times per day for approximately 15 minutes each time. She should continue  to use the walker until she transitions to ambulating with the brace on. I added that it is easier to put the brace on when it is locked in extension. We will resume therapy after the next visit in 2 weeks.    Return in about 2 weeks (around 3/1/2021).    Scribed for Jeffrey Sutton MD by BEKAH GARCIA.  2/16/2021  18:33 EST    I Jeffrey Sutton MD have personally performed the services described in this document as scribed by the above individual, and it is both accurate and complete.     Jeffrey Sutton MD  2/16/2021  09:54 EST

## 2021-02-17 ENCOUNTER — TELEPHONE (OUTPATIENT)
Dept: ORTHOPEDIC SURGERY | Facility: CLINIC | Age: 76
End: 2021-02-17

## 2021-02-17 NOTE — TELEPHONE ENCOUNTER
I called patient to let her know that  I spoke to Dr. Sutton and he said that it will be fine for her to get the COVID vaccination , she understood.

## 2021-02-17 NOTE — TELEPHONE ENCOUNTER
Patient had surgery on 1/7/21 and would like to know if it is okay to receive the COVID vaccine this soon after surgery. She can be reached at [phone].

## 2021-02-18 ENCOUNTER — TELEPHONE (OUTPATIENT)
Dept: ORTHOPEDIC SURGERY | Facility: CLINIC | Age: 76
End: 2021-02-18

## 2021-02-18 NOTE — TELEPHONE ENCOUNTER
RAYMOND FROM [facility] CALLED AND NEEDS A ORDER TO CONTINUE OT THERAPY WITH PATIENT. RAYMOND CAN BE REACHED @ [phone]

## 2021-03-01 ENCOUNTER — OFFICE VISIT (OUTPATIENT)
Dept: ORTHOPEDIC SURGERY | Facility: CLINIC | Age: 76
End: 2021-03-01

## 2021-03-01 ENCOUNTER — TRANSCRIBE ORDERS (OUTPATIENT)
Dept: ADMINISTRATIVE | Facility: HOSPITAL | Age: 76
End: 2021-03-01

## 2021-03-01 DIAGNOSIS — Z12.31 VISIT FOR SCREENING MAMMOGRAM: Primary | ICD-10-CM

## 2021-03-01 DIAGNOSIS — L08.9 TOE INFECTION: ICD-10-CM

## 2021-03-01 DIAGNOSIS — Z98.890 S/P TENDON REPAIR: ICD-10-CM

## 2021-03-01 DIAGNOSIS — Z96.651 STATUS POST TOTAL KNEE REPLACEMENT, RIGHT: Primary | ICD-10-CM

## 2021-03-01 PROCEDURE — 99024 POSTOP FOLLOW-UP VISIT: CPT | Performed by: PHYSICIAN ASSISTANT

## 2021-03-01 NOTE — PROGRESS NOTES
[facility] [place] Orthopaedic Surgery Clinic Note        Subjective     Post-op (2 weeks status post total knee replacement, right 01/05/21 and quadriceps tendon repair, right 01/07/2021)       BHAVESH Murcia is a 75 y.o. female.  Patient returns for follow-up status post right TKA and subsequent quadricep tendon repair performed on the above-stated dates.  She reports she is improving.  She endorses a pain scale of 1/10.  She is wearing her TROM brace that is unlocked from 0 to 90 degrees.  She is currently using a cane to assist with ambulation.    No reported fever, chills, night sweats or other constitutional symptoms.        Objective      Physical Exam  There were no vitals taken for this visit.    There is no height or weight on file to calculate BMI.        Ortho Exam  Integument:   Right knee: Incision is well-healed with no signs of infection    Lower Extremities:   Right knee:    Tenderness:  Slight discomfort noted to the knee    Effusion:  None    Swelling: None    Crepitus:  None    Range of motion:  Extension: 0°       Flexion: 80/85°    Instability:  No varus laxity, no valgus laxity, negative anterior drawer    Deformities:  None    Straight leg raise: Intact      Imaging Reviewed:  Ordered right knee plain films.  Imaging read by Dr. Sutton.    Right Knee Radiographs  Indication: status-post right total knee arthroplasty with quad tendon repair  Views: AP, lateral, and sunrise views of the right knee     Comparison: no change compared to prior study, 2/1/2021     Findings:   The components are well aligned, with no signs of loosening or failure.  Good alignment of the patella in particular.      Assessment:  1. Status post total knee replacement, right    2. S/P tendon repair        Plan:  1. Status post right TKA and quadricep tendon repair, stable and doing well.  2. Patient will continue use of cane to assist with ambulation.  3. Because if she is unable to drive will order home health PT for the  next 2 to 3 weeks then patient can transition to outpatient PT at [facility] when able to drive.  Patient is cleared to begin driving in 2 weeks if she is no longer requiring the TROM brace.  Both referrals have been placed.  4. TROM brace was open today 0-120--she will continue to wear brace for the next 2 weeks especially when walking.  After 2 weeks if she is doing well she may discontinue use of the TROM brace.  5. Follow-up 4 weeks for repeat evaluation, sooner if issues arise or symptoms worsen/change.  6. Questions and concerns answered.    Patient was also examined by Dr. Sutton and he agrees with the above assessment and plan.      Johana Jenkins PA-C  03/03/21  09:00 EST      Dragon disclaimer:  Much of this encounter note is an electronic transcription/translation of spoken language to printed text. The electronic translation of spoken language may permit erroneous, or at times, nonsensical words or phrases to be inadvertently transcribed; Although I have reviewed the note for such errors, some may still exist.

## 2021-04-28 ENCOUNTER — OFFICE VISIT (OUTPATIENT)
Dept: ORTHOPEDIC SURGERY | Facility: CLINIC | Age: 76
End: 2021-04-28

## 2021-04-28 VITALS
HEIGHT: 61 IN | HEART RATE: 91 BPM | SYSTOLIC BLOOD PRESSURE: 157 MMHG | WEIGHT: 158.6 LBS | DIASTOLIC BLOOD PRESSURE: 73 MMHG | BODY MASS INDEX: 29.94 KG/M2

## 2021-04-28 DIAGNOSIS — Z96.651 STATUS POST TOTAL KNEE REPLACEMENT, RIGHT: Primary | ICD-10-CM

## 2021-04-28 DIAGNOSIS — Z98.890 S/P TENDON REPAIR: ICD-10-CM

## 2021-04-28 PROCEDURE — 99212 OFFICE O/P EST SF 10 MIN: CPT | Performed by: ORTHOPAEDIC SURGERY

## 2021-04-28 NOTE — PROGRESS NOTES
[facility] [place] Orthopaedic Surgery Clinic Note    Subjective     Chief Complaint   Patient presents with   • Post-op Follow-up     1 month recheck - 4 months S/P total knee replacement, right 01/05/21 and quadriceps tendon repair, right 01/07/2021        HPI    Lilia Murcia is a 75 y.o. female who follows up for status post right total knee arthroplasty on 01/05/2021 with quadriceps tendon repair on 01/07/2021. She is here today for a routine follow-up.     The patient reports that her knee feels good, but her tendon is still weak. She notes that it will give out when she is going down stairs or walking. She also has difficulty getting down from her truck and is still having swelling. She rates her pain as 100 percent improved as compared to before surgery. The patient is working to regain strength and range of motion.     I have reviewed the following portions of the patient's history and agree with: History of Present Illness and Review of Systems    Patient Active Problem List   Diagnosis   • Primary osteoarthritis of left knee   • Primary osteoarthritis of right knee   • Status post total left knee replacement   • HTN (hypertension)   • Prediabetes   • Leukocytosis, likely reactive   • Acute blood loss anemia, mild, likely reactive   • Acute postoperative pain   • Degenerative arthritis of right knee   • S/P total knee arthroplasty, right   • Acute postoperative pain of right knee   • Quadriceps tendon rupture status post repair   • Rupture of right quadriceps tendon     Past Medical History:   Diagnosis Date   • Arthritis    • Asthma    • GERD (gastroesophageal reflux disease)    • Hiatal hernia     still present    • Hyperlipidemia    • Hypertension    • Skin cancer     basal cell still present on top of head   • Wears glasses       Past Surgical History:   Procedure Laterality Date   • BREAST EXCISIONAL BIOPSY Right 11/12/2013   • CATARACT EXTRACTION      bilat    • CHOLECYSTECTOMY     • COLONOSCOPY     •  ENDOSCOPY     • HYSTERECTOMY      total    • INJECTION OF MEDICATION      x5 bilat knee cortisone    • OOPHORECTOMY     • QUADRICEPS TENDON REPAIR Right 1/7/2021    Procedure: QUADRICEPS TENDON REPAIR RIGHT;  Surgeon: Jeffrey Sutton MD;  Location:  [facility];  Service: Orthopedics;  Laterality: Right;   • SKIN BIOPSY     • TOTAL KNEE ARTHROPLASTY Left 9/10/2019    Procedure: TOTAL KNEE LEFT ARTHROPLASTY;  Surgeon: Jeffrey Sutton MD;  Location:  [facility];  Service: Orthopedics   • TOTAL KNEE ARTHROPLASTY Right 1/5/2021    Procedure: TOTAL KNEE ARTHROPLASTY RIGHT;  Surgeon: Jeffrey Sutton MD;  Location:  [facility];  Service: Orthopedics;  Laterality: Right;      Family History   Problem Relation Age of Onset   • Breast cancer Neg Hx    • Ovarian cancer Neg Hx      Social History     Socioeconomic History   • Marital status:      Spouse name: Not on file   • Number of children: Not on file   • Years of education: Not on file   • Highest education level: Not on file   Tobacco Use   • Smoking status: Never Smoker   • Smokeless tobacco: Never Used   Vaping Use   • Vaping Use: Never used   Substance and Sexual Activity   • Alcohol use: No   • Drug use: No   • Sexual activity: Defer      Current Outpatient Medications on File Prior to Visit   Medication Sig Dispense Refill   • albuterol sulfate  (90 Base) MCG/ACT inhaler Inhale 2 puffs Every 6 (Six) Hours As Needed for Wheezing.     • aspirin 81 MG chewable tablet Chew 1 tablet Daily. Resume in 1 month     • Cholecalciferol (VITAMIN D PO) Take 2,000 mg by mouth Daily.     • hydrochlorothiazide (HYDRODIURIL) 25 MG tablet Take 25 mg by mouth Daily.     • omeprazole (priLOSEC) 20 MG capsule Take 20 mg by mouth Daily As Needed.     • oxyCODONE (Roxicodone) 5 MG immediate release tablet Take 1 tablet by mouth Every 4 (Four) Hours As Needed for Moderate Pain . 40 tablet 0   • quinapril (ACCUPRIL) 40 MG tablet Take 40 mg by mouth Daily.     • Ropivacine HCl-NaCl  "(NAROPIN) 20 mg/hr by Peripheral Nerve route Continuous. Indications: Acute Pain       No current facility-administered medications on file prior to visit.      Allergies   Allergen Reactions   • Sulfa Antibiotics Rash and Unknown (See Comments)     May or may not be a reaction. Has been years since a reaction.         Review of Systems   Constitutional: Negative.    HENT: Negative.    Eyes: Negative.    Respiratory: Negative.    Cardiovascular: Negative.    Gastrointestinal: Negative.    Endocrine: Negative.    Genitourinary: Negative.    Musculoskeletal: Positive for arthralgias.   Skin: Negative.    Allergic/Immunologic: Negative.    Neurological: Negative.    Hematological: Negative.    Psychiatric/Behavioral: Negative.         Objective      Physical Exam  /73   Pulse 91   Ht 154.9 cm (61\")   Wt 71.9 kg (158 lb 9.6 oz)   BMI 29.97 kg/m²     Body mass index is 29.97 kg/m².    General:   Mental Status:  Alert   Appearance: Cooperative, in no acute distress   Build and Nutrition: Well-nourished well-developed female   Orientation: Alert and oriented to person, place and time   Posture: Normal   Gait: Nonantalgic    Integument       • Right Knee: No skin lesions, rash, or ecchymosis.    Lower Extremities  • Right Knee:        • Tenderness: None       • Swelling: None        • Effusion: None       • Crepitus: None       • Atrophy: None       • Range of motion:             • Extension: 0°             • Flexion: 125°        • Instability: No varus or valgus laxity. Negative anterior drawer.       • Deformities: None       • Straight leg raise: Strong    Imaging/Studies  No new radiographs.     Assessment and Plan     Diagnoses and all orders for this visit:    1. Status post total knee replacement, right (Primary)    2. S/P tendon repair        1. Status post total knee replacement, right    2. S/P tendon repair        The patient is recovering well from her total knee arthroplasty and subsequent quadriceps " tendon repair. Her tendon is still weak, but is healing well. I advised her to continue her strengthening exercises.     Return in about 3 months (around 7/28/2021) for Recheck with X-Rays.      Scribed for Jeffrey Sutton MD by Steffanie Maciel.  04/28/21   10:32 EDT    I have personally performed the services described in this document as scribed by the above individual, and it is both accurate and complete.  Jeffrey Sutton MD  4/28/2021  13:20 EDT

## 2021-05-04 ENCOUNTER — HOSPITAL ENCOUNTER (OUTPATIENT)
Dept: MAMMOGRAPHY | Facility: HOSPITAL | Age: 76
Discharge: HOME | End: 2021-05-04
Admitting: FAMILY MEDICINE

## 2021-05-04 DIAGNOSIS — Z12.31 VISIT FOR SCREENING MAMMOGRAM: ICD-10-CM

## 2021-05-04 PROCEDURE — 77067 SCR MAMMO BI INCL CAD: CPT

## 2021-05-04 PROCEDURE — 77063 BREAST TOMOSYNTHESIS BI: CPT | Performed by: RADIOLOGY

## 2021-05-04 PROCEDURE — 77063 BREAST TOMOSYNTHESIS BI: CPT

## 2021-05-04 PROCEDURE — 77067 SCR MAMMO BI INCL CAD: CPT | Performed by: RADIOLOGY

## 2021-07-21 ENCOUNTER — OFFICE VISIT (OUTPATIENT)
Dept: ORTHOPEDIC SURGERY | Facility: CLINIC | Age: 76
End: 2021-07-21

## 2021-07-21 VITALS
SYSTOLIC BLOOD PRESSURE: 150 MMHG | BODY MASS INDEX: 30.4 KG/M2 | HEIGHT: 61 IN | WEIGHT: 161 LBS | DIASTOLIC BLOOD PRESSURE: 52 MMHG | HEART RATE: 78 BPM

## 2021-07-21 DIAGNOSIS — Z09 POSTOPERATIVE EXAMINATION: ICD-10-CM

## 2021-07-21 DIAGNOSIS — Z96.651 STATUS POST TOTAL KNEE REPLACEMENT, RIGHT: Primary | ICD-10-CM

## 2021-07-21 PROCEDURE — 99213 OFFICE O/P EST LOW 20 MIN: CPT | Performed by: ORTHOPAEDIC SURGERY

## 2021-07-21 NOTE — PROGRESS NOTES
[facility] [place] Orthopaedic Surgery Clinic Note    Subjective     Chief Complaint   Patient presents with   • Follow-up     3 month follow up - 6 months S/P total knee replacement, right 01/05/21 and quadriceps tendon repair, right 01/07/2021        HPI    Lilia Murcia is a 75 y.o. female who follows up for her right total knee arthroplasty. She had a right total knee arthroplasty performed on 01/05/2021 and then a quadriceps tendon repair on 01/07/2021. She is here today for a routine follow-up.    Her right knee is doing well; however, her tendon is still giving her problems when she tries to negotiate steps and getting out of a vehicle. Her right knee feels 100% better compared to before surgery. She has some difficulty when she first stands. She is doing well with ambulation; however, she will have some buckling when negotiating stairs. She is uncertain if her insurance will approve more physical therapy.     I have reviewed the following portions of the patient's history and agree with: History of Present Illness and Review of Systems    Patient Active Problem List   Diagnosis   • Primary osteoarthritis of left knee   • Primary osteoarthritis of right knee   • Status post total left knee replacement   • HTN (hypertension)   • Prediabetes   • Leukocytosis, likely reactive   • Acute blood loss anemia, mild, likely reactive   • Acute postoperative pain   • Degenerative arthritis of right knee   • S/P total knee arthroplasty, right   • Acute postoperative pain of right knee   • Quadriceps tendon rupture status post repair   • Rupture of right quadriceps tendon     Past Medical History:   Diagnosis Date   • Arthritis    • Asthma    • GERD (gastroesophageal reflux disease)    • Hiatal hernia     still present    • Hyperlipidemia    • Hypertension    • Skin cancer     basal cell still present on top of head   • Wears glasses       Past Surgical History:   Procedure Laterality Date   • BREAST EXCISIONAL BIOPSY Right  11/12/2013   • CATARACT EXTRACTION      bilat    • CHOLECYSTECTOMY     • COLONOSCOPY     • ENDOSCOPY     • HYSTERECTOMY      total    • INJECTION OF MEDICATION      x5 bilat knee cortisone    • OOPHORECTOMY     • QUADRICEPS TENDON REPAIR Right 1/7/2021    Procedure: QUADRICEPS TENDON REPAIR RIGHT;  Surgeon: Jeffrey Sutton MD;  Location:  [facility];  Service: Orthopedics;  Laterality: Right;   • SKIN BIOPSY     • TOTAL KNEE ARTHROPLASTY Left 9/10/2019    Procedure: TOTAL KNEE LEFT ARTHROPLASTY;  Surgeon: Jeffrey Sutton MD;  Location:  [facility];  Service: Orthopedics   • TOTAL KNEE ARTHROPLASTY Right 1/5/2021    Procedure: TOTAL KNEE ARTHROPLASTY RIGHT;  Surgeon: Jeffrey Sutton MD;  Location:  [facility];  Service: Orthopedics;  Laterality: Right;      Family History   Problem Relation Age of Onset   • Breast cancer Neg Hx    • Ovarian cancer Neg Hx      Social History     Socioeconomic History   • Marital status:      Spouse name: Not on file   • Number of children: Not on file   • Years of education: Not on file   • Highest education level: Not on file   Tobacco Use   • Smoking status: Never Smoker   • Smokeless tobacco: Never Used   Vaping Use   • Vaping Use: Never used   Substance and Sexual Activity   • Alcohol use: No   • Drug use: No   • Sexual activity: Defer      Current Outpatient Medications on File Prior to Visit   Medication Sig Dispense Refill   • albuterol sulfate  (90 Base) MCG/ACT inhaler Inhale 2 puffs Every 6 (Six) Hours As Needed for Wheezing.     • aspirin 81 MG chewable tablet Chew 1 tablet Daily. Resume in 1 month     • Cholecalciferol (VITAMIN D PO) Take 2,000 mg by mouth Daily.     • hydrochlorothiazide (HYDRODIURIL) 25 MG tablet Take 25 mg by mouth Daily.     • omeprazole (priLOSEC) 20 MG capsule Take 20 mg by mouth Daily As Needed.     • oxyCODONE (Roxicodone) 5 MG immediate release tablet Take 1 tablet by mouth Every 4 (Four) Hours As Needed for Moderate Pain . 40 tablet 0    • quinapril (ACCUPRIL) 40 MG tablet Take 40 mg by mouth Daily.     • Ropivacine HCl-NaCl (NAROPIN) 20 mg/hr by Peripheral Nerve route Continuous. Indications: Acute Pain       No current facility-administered medications on file prior to visit.      Allergies   Allergen Reactions   • Sulfa Antibiotics Rash and Unknown (See Comments)     May or may not be a reaction. Has been years since a reaction.         Review of Systems   Constitutional: Negative for activity change, appetite change, chills, diaphoresis, fatigue, fever and unexpected weight change.   HENT: Negative for congestion, dental problem, drooling, ear discharge, ear pain, facial swelling, hearing loss, mouth sores, nosebleeds, postnasal drip, rhinorrhea, sinus pressure, sneezing, sore throat, tinnitus, trouble swallowing and voice change.    Eyes: Negative for photophobia, pain, discharge, redness, itching and visual disturbance.   Respiratory: Negative for apnea, cough, choking, chest tightness, shortness of breath, wheezing and stridor.    Cardiovascular: Negative for chest pain, palpitations and leg swelling.   Gastrointestinal: Negative for abdominal distention, abdominal pain, anal bleeding, blood in stool, constipation, diarrhea, nausea, rectal pain and vomiting.   Endocrine: Negative for cold intolerance, heat intolerance, polydipsia, polyphagia and polyuria.   Genitourinary: Negative for decreased urine volume, difficulty urinating, dysuria, enuresis, flank pain, frequency, genital sores, hematuria and urgency.   Musculoskeletal: Positive for arthralgias. Negative for back pain, gait problem, joint swelling, myalgias, neck pain and neck stiffness.   Skin: Negative for color change, pallor, rash and wound.   Allergic/Immunologic: Negative for environmental allergies, food allergies and immunocompromised state.   Neurological: Negative for dizziness, tremors, seizures, syncope, facial asymmetry, speech difficulty, weakness, light-headedness,  "numbness and headaches.   Hematological: Negative for adenopathy. Does not bruise/bleed easily.   Psychiatric/Behavioral: Negative for agitation, behavioral problems, confusion, decreased concentration, dysphoric mood, hallucinations, self-injury, sleep disturbance and suicidal ideas. The patient is not nervous/anxious and is not hyperactive.         Objective      Physical Exam  /52   Pulse 78   Ht 154.9 cm (60.98\")   Wt 73 kg (161 lb)   BMI 30.44 kg/m²     Body mass index is 30.44 kg/m².    General:   Mental Status:  Alert   Appearance: Cooperative, in no acute distress   Build and Nutrition: Well-nourished well-developed female   Orientation: Alert and oriented to person, place and time   Posture: Normal   Gait: Nonantalgic    Integument  • Right knee: Wound is well-healed with no signs of infection    Lower Extremities  • Right Knee:  • Tenderness: No medial or lateral joint line tenderness.  • Swelling: None  • Effusion: Trace  • Crepitus: None  • Atrophy: None  • Range of motion:  • Extension: 0° passively; 5° actively  • Flexion: 130°  • Instability: No varus or valgus laxity. Negative anterior drawer.  • Deformities: None  • Straight leg raise is intact.      Imaging/Studies  Imaging Results (Last 24 Hours)     Procedure Component Value Units Date/Time    XR Knee 3+ View With No Name Right [[id number]] Resulted: 07/21/21 0930     Updated: 07/21/21 0931    Narrative:      Right Knee Radiographs  Indication: status-post right total knee arthroplasty  Views: AP, lateral, and sunrise views of the right knee    Comparison: 3/31/2021    Findings:   The components are well aligned, with no signs of loosening or failure.    Calcification seen on the medial aspect of patella on the sunrise view.    Evidence of drill hole from quadriceps tendon repair.            Assessment and Plan     Diagnoses and all orders for this visit:    1. Status post total knee replacement, right (Primary)  -     XR Knee 3+ View " With Elie Right    2. Postoperative examination        1. Status post total knee replacement, right    2. Postoperative examination        I have reviewed my findings with the patient today. She is doing well overall postoperatively.  She is going to continue to work on quad strengthening, and she plans to continue with physical therapy at this time.    Return in about 6 months (around 1/21/2022) for Recheck with X-Rays.      Scribed for Jeffrey Sutton MD by Derrick Dasilva.  07/21/21   10:43 EDT    I have personally performed the services described in this document as scribed by the above individual, and it is both accurate and complete.  Jeffrey Sutton MD  7/21/2021  12:53 EDT

## 2021-09-08 ENCOUNTER — OFFICE VISIT (OUTPATIENT)
Dept: ORTHOPEDIC SURGERY | Facility: CLINIC | Age: 76
End: 2021-09-08

## 2021-09-08 VITALS
WEIGHT: 159.2 LBS | HEIGHT: 61 IN | DIASTOLIC BLOOD PRESSURE: 59 MMHG | HEART RATE: 82 BPM | SYSTOLIC BLOOD PRESSURE: 151 MMHG | BODY MASS INDEX: 30.06 KG/M2

## 2021-09-08 DIAGNOSIS — Z96.651 STATUS POST TOTAL KNEE REPLACEMENT, RIGHT: Primary | ICD-10-CM

## 2021-09-08 DIAGNOSIS — Z98.890 S/P TENDON REPAIR: ICD-10-CM

## 2021-09-08 PROCEDURE — 99213 OFFICE O/P EST LOW 20 MIN: CPT | Performed by: PHYSICIAN ASSISTANT

## 2021-09-08 NOTE — PROGRESS NOTES
"        [facility] [place] Orthopaedic Surgery Clinic Note        Subjective     CC: Follow-up (Right knee pain 9 months S/P total knee replacement, right 01/05/21 and quadriceps tendon repair, right 01/07/2021)      BHAVESH Murcia is a 76 y.o. female.  Patient returns today with concerns regarding her right knee.  She is status post right knee replacement followed by quad tendon repair 2 days later with Dr. Sutton in January she reports she is still having problems with the knee giving out.  She has difficulty going up and down stairs normally.  She has pain 3/10.  Difficulty from a seated position.  Treated with physical therapy as she is currently on her third round.  Here for further evaluation recommendations        ROS:    Constiutional:Pt denies fever, chills, nausea, or vomiting.  MSK:as above        Objective      Past Medical History  Past Medical History:   Diagnosis Date   • Arthritis    • Asthma    • GERD (gastroesophageal reflux disease)    • Hiatal hernia     still present    • Hyperlipidemia    • Hypertension    • Skin cancer     basal cell still present on top of head   • Wears glasses          Physical Exam  /59   Pulse 82   Ht 154.9 cm (60.98\")   Wt 72.2 kg (159 lb 3.2 oz)   BMI 30.10 kg/m²     Body mass index is 30.1 kg/m².    Patient is well nourished and well developed.        Ortho Exam  Right knee exam: Nontender to palpation.  No defect noted in the quad tendon.  Well-healed incision.  Patient is lacking 10 degrees extension.  Flexion 120.  Ligaments stable to valgus and varus stress.  Neurovascular intact distally.        Assessment    Assessment:  1. Status post total knee replacement, right    2. S/P tendon repair        Plan:  1. Recommend over the counter anti-inflammatories for pain and/or swelling  2. Progressing status post right total knee arthroplasty followed by quad tendon repair 2 days later.  X-rays show well-positioned total knee arthroplasty with no evidence of osteolysis, " subsidence or fracture.  Patient is now approximately 7 months out of surgery and is concerned with her continued weakness and giving way.  She also asked about further imaging regarding the status of the quad tendon.  We reassured her that her knee itself looks good.  Clinically, there is no palpable defect at the quad tendon and she is able to extend her knee actively.  Recommendation today is continued physical therapy.  She will return in January for repeat exam or sooner as needed.  We did discuss that MRI is not ideal following her replacement given the metal artifact.  CT scan well can be done Dr. Avina did not think 5 any information that would be of benefit.  Again, return in January or sooner as needed.    Patient history, diagnosis and treatment plan discussed with Dr. Sutton.        Lien Mccauley PA-C  09/10/21  05:48 EDT      Dragon disclaimer:  Much of this encounter note is an electronic transcription/translation of spoken language to printed text. The electronic translation of spoken language may permit erroneous, or at times, nonsensical words or phrases to be inadvertently transcribed; Although I have reviewed the note for such errors, some may still exist.

## 2021-09-14 ENCOUNTER — TELEPHONE (OUTPATIENT)
Dept: ORTHOPEDIC SURGERY | Facility: CLINIC | Age: 76
End: 2021-09-14

## 2021-09-14 NOTE — TELEPHONE ENCOUNTER
SPOKE WITH STANTON FROM PT- SHE WANTS TO KNOW IF IT IS OK IF SHE DOES DRY NEEDLING R KNEE IN THE QUAD/CALF/HAMSTRING, NOT THE JOINT. PLEASE ADVISE.

## 2021-09-14 NOTE — TELEPHONE ENCOUNTER
Dr. Sutton-please see message below and advise. Pt 8 months S/P total knee replacement, right 01/05/21 and quadriceps tendon repair, right 01/07/2021    Thanks!  Rachana

## 2022-01-24 ENCOUNTER — OFFICE VISIT (OUTPATIENT)
Dept: ORTHOPEDIC SURGERY | Facility: CLINIC | Age: 77
End: 2022-01-24

## 2022-01-24 VITALS — HEIGHT: 61 IN | BODY MASS INDEX: 31.76 KG/M2

## 2022-01-24 DIAGNOSIS — Z96.651 STATUS POST TOTAL KNEE REPLACEMENT, RIGHT: Primary | ICD-10-CM

## 2022-01-24 DIAGNOSIS — Z98.890 S/P TENDON REPAIR: ICD-10-CM

## 2022-01-24 PROCEDURE — 99213 OFFICE O/P EST LOW 20 MIN: CPT | Performed by: ORTHOPAEDIC SURGERY

## 2022-01-24 ASSESSMENT — KOOS JR
KOOS JR SCORE: 6
KOOS JR SCORE: 70.704

## 2022-01-24 NOTE — PROGRESS NOTES
[facility] Note    Subjective     Chief Complaint   Patient presents with   • Follow-up     4 month f/u; 1 year S/P total knee replacement, right 01/05/21 and quadriceps tendon repair, right 01/07/2021        HPI    It has been 4  month(s) since Ms. Murcia's last visit. She returns to clinic today for follow-up of right knee arthroplasty. The issue has been ongoing for 1 year(s). She rates her pain a 2/10 on the pain scale. Previous/current treatments: physical therapy. Current symptoms: giving way/buckling. The pain is worse with walking, standing and climbing stairs; pain medication and/or NSAID improve the pain. Overall, she is doing the same.  80 to 90% improvement compared to her preoperative symptoms.  Ambulatory without external aids.  She has difficulty with stairs.    I have reviewed the following portions of the patient's history and agree with: History of Present Illness and Review of Systems    Patient Active Problem List   Diagnosis   • Primary osteoarthritis of left knee   • Primary osteoarthritis of right knee   • Status post total left knee replacement   • HTN (hypertension)   • Prediabetes   • Leukocytosis, likely reactive   • Acute blood loss anemia, mild, likely reactive   • Acute postoperative pain   • Degenerative arthritis of right knee   • S/P total knee arthroplasty, right   • Acute postoperative pain of right knee   • Quadriceps tendon rupture status post repair   • Rupture of right quadriceps tendon     Past Medical History:   Diagnosis Date   • Arthritis    • Asthma    • GERD (gastroesophageal reflux disease)    • Hiatal hernia     still present    • Hyperlipidemia    • Hypertension    • Skin cancer     basal cell still present on top of head   • Wears glasses       Past Surgical History:   Procedure Laterality Date   • BREAST EXCISIONAL BIOPSY Right 11/12/2013   • CATARACT EXTRACTION      bilat    • CHOLECYSTECTOMY     • COLONOSCOPY     • ENDOSCOPY     •  HYSTERECTOMY      total    • INJECTION OF MEDICATION      x5 bilat knee cortisone    • OOPHORECTOMY     • QUADRICEPS TENDON REPAIR Right 1/7/2021    Procedure: QUADRICEPS TENDON REPAIR RIGHT;  Surgeon: Jeffrey Sutton MD;  Location:  [facility];  Service: Orthopedics;  Laterality: Right;   • SKIN BIOPSY     • TOTAL KNEE ARTHROPLASTY Left 9/10/2019    Procedure: TOTAL KNEE LEFT ARTHROPLASTY;  Surgeon: Jeffrey Sutton MD;  Location:  [facility];  Service: Orthopedics   • TOTAL KNEE ARTHROPLASTY Right 1/5/2021    Procedure: TOTAL KNEE ARTHROPLASTY RIGHT;  Surgeon: Jeffrey Sutton MD;  Location:  [facility];  Service: Orthopedics;  Laterality: Right;      Family History   Problem Relation Age of Onset   • Breast cancer Neg Hx    • Ovarian cancer Neg Hx      Social History     Socioeconomic History   • Marital status:    Tobacco Use   • Smoking status: Never Smoker   • Smokeless tobacco: Never Used   Vaping Use   • Vaping Use: Never used   Substance and Sexual Activity   • Alcohol use: No   • Drug use: No   • Sexual activity: Defer      Current Outpatient Medications on File Prior to Visit   Medication Sig Dispense Refill   • albuterol sulfate  (90 Base) MCG/ACT inhaler Inhale 2 puffs Every 6 (Six) Hours As Needed for Wheezing.     • aspirin 81 MG chewable tablet Chew 1 tablet Daily. Resume in 1 month     • Cholecalciferol (VITAMIN D PO) Take 2,000 mg by mouth Daily.     • hydrochlorothiazide (HYDRODIURIL) 25 MG tablet Take 25 mg by mouth Daily.     • omeprazole (priLOSEC) 20 MG capsule Take 20 mg by mouth Daily As Needed.     • quinapril (ACCUPRIL) 40 MG tablet Take 40 mg by mouth Daily.     • [DISCONTINUED] amoxicillin (AMOXIL) 500 MG capsule Take 2 capsules by mouth 2 (Two) Times a Day. 40 capsule 0   • [DISCONTINUED] predniSONE (DELTASONE) 20 MG tablet 3 tabs for 5 days 15 tablet 0     No current facility-administered medications on file prior to visit.      Allergies   Allergen Reactions   • Sulfa  "Antibiotics Rash and Unknown (See Comments)     May or may not be a reaction. Has been years since a reaction.         Review of Systems   Constitutional: Negative.    HENT: Negative.    Eyes: Negative.    Respiratory: Negative.    Cardiovascular: Negative.    Gastrointestinal: Negative.    Endocrine: Negative.    Genitourinary: Negative.    Musculoskeletal: Positive for arthralgias.   Skin: Negative.    Allergic/Immunologic: Negative.    Neurological: Negative.    Hematological: Negative.    Psychiatric/Behavioral: Negative.         Objective      Physical Exam  Ht 154.9 cm (60.98\")   BMI 31.76 kg/m²     Body mass index is 31.76 kg/m².    General:   Mental Status:  Alert   Appearance: Cooperative, in no acute distress   Build and Nutrition: Well-nourished well-developed female   Orientation: Alert and oriented to person, place and time   Posture: Normal   Gait: Nonantalgic    Integument:   Right knee: Wound is well-healed with no signs of infection    Lower Extremities:   Right Knee:    Tenderness:  None    Effusion:  None    Swelling: None    Crepitus:  None    Range of motion:  Extension: 10° actively, 0° passively       Flexion: 120°  Instability:  No varus laxity, no valgus laxity, negative anterior drawer  Deformities:  None      Imaging/Studies  Imaging Results (Last 24 Hours)     Procedure Component Value Units Date/Time    XR Knee 3+ View With Gerton Right [[id number]] Resulted: 01/24/22 0929     Updated: 01/24/22 0930    Narrative:      Right Knee Radiographs  Indication: status-post right total knee arthroplasty  Views: AP, lateral, and sunrise views of the right knee    Comparison: 9/8/2021    Findings:   The components are well aligned, with no gross signs of loosening or   failure.            Assessment and Plan     Diagnoses and all orders for this visit:    1. Status post total knee replacement, right (Primary)  -     XR Knee 3+ View With Gerton Right  -     Ambulatory Referral to Physical Therapy " Evaluate and treat, Ortho    2. S/P tendon repair  -     Ambulatory Referral to Physical Therapy Evaluate and treat, Ortho        1. Status post total knee replacement, right    2. S/P tendon repair        I reviewed my findings with patient.  She still has an active extension lag, most likely secondary to quadriceps weakness following her quadriceps tendon injury following surgery.  She would like to try some additional therapy, and a referral was provided.  I will see her back in 6 months, but sooner for any problems.    Return in about 6 months (around 7/24/2022) for Recheck with X-Rays.      Jeffrey Sutton MD  01/24/22  09:41 EST

## 2022-02-02 ENCOUNTER — TELEPHONE (OUTPATIENT)
Dept: ORTHOPEDICS | Facility: OTHER | Age: 77
End: 2022-02-02

## 2022-02-07 ENCOUNTER — TREATMENT (OUTPATIENT)
Dept: PHYSICAL THERAPY | Facility: CLINIC | Age: 77
End: 2022-02-07

## 2022-02-07 DIAGNOSIS — G89.29 CHRONIC PAIN OF RIGHT KNEE: Primary | ICD-10-CM

## 2022-02-07 DIAGNOSIS — M25.561 CHRONIC PAIN OF RIGHT KNEE: Primary | ICD-10-CM

## 2022-02-07 PROCEDURE — 97140 MANUAL THERAPY 1/> REGIONS: CPT | Performed by: PHYSICAL THERAPIST

## 2022-02-07 PROCEDURE — 97110 THERAPEUTIC EXERCISES: CPT | Performed by: PHYSICAL THERAPIST

## 2022-02-07 PROCEDURE — 97162 PT EVAL MOD COMPLEX 30 MIN: CPT | Performed by: PHYSICAL THERAPIST

## 2022-02-07 NOTE — PROGRESS NOTES
Physical Therapy Initial Evaluation and Plan of Care      Patient: Lilia Murcia   : 1945  Diagnosis/ICD-10 Code:  Chronic pain of right knee [M25.561, G89.29]  Referring practitioner: Jeffrey Sutton MD    Subjective Evaluation    History of Present Illness  Date of surgery: R TKA->21:  R quadricep tendon rupture repair->21.    Subjective comment: Was being transferred to her van when leaving the hospital after having her right knee replaced.  Felt like she was not being handled well by the hospital staff to assist her to the vehicle.  That's when her knee buckled.  Was told that she had to be readmitted to the hospital by visitng the ED.  She was later diagnosed with a quadricep tendon tear.  Had surgery to repair the tendon.  Rehabiliated at [facility].  Feels like most of her knee mobility has been restored, but still has weakness in the knee.  (Denies low back pain, numbness and tingling in the right leg.  Does feel like her leg is stronger the past 6 months.)  Patient Occupation: Retired Quality of life: good    Pain  Exacerbated by: Sit to stand (painful and weak); stiff and sore in AM; walking/standing too long (knee pain and weakness); has to go down curbs leading with left leg; goes up stairs using handrails.    Social Support  Lives in: multiple-level home  Lives with: alone    Treatments  Previous treatment: physical therapy  Patient Goals  Patient goals for therapy: increased strength  Patient goal: Be able to walk, stand, go up/down stairs and get out of chairs without pain or difficulty.           Objective          Neurological Testing     Reflexes   Left   Patellar (L4): normal (2+)    Right   Patellar (L4): absent (0)    Active Range of Motion   Left Knee   Flexion: 125 degrees   Extension: 0 degrees   Extensor la degrees     Right Knee   Flexion: 118 (pain at end-range) degrees   Extension: 0 degrees   Extensor la degrees     Strength/Myotome Testing     Left Knee    Flexion: 5  Quadriceps contraction: good    Right Knee   Flexion: 4-  Quadriceps contraction: fair     General Comments     Knee Comments  *LEFS:  33/80  *Mid-thigh girth:  57 cm B/L         Assessment & Plan     Assessment  Impairments: abnormal or restricted ROM  Functional Limitations: walking and standing  Assessment details: Ms. Murcia is a 76 year old female that presents to physical therapy s/p R TKA 1/5/21 and a R quadricep tendon rupture on 1/7/21.  PMH was covered and reviewed during interview.    Prognosis: fair  Prognosis details: Based on chronicity of pathology.      Goals  Plan Goals: STGs:  1.)  LEFS improved x 1 MCID in 6 weeks.  2.)  Have 0 deg of quadricep lag sign in 6 weeks.  3.)  Have 120 deg of AROM knee flexion without pain in 6 weeks.  LTGs:  1.)  Have 5/5 knee extension strength in 8 weeks.  2.)  Be able to perform standing, walking and stair climbing without pain or difficulty in 12 weeks.    Plan  Therapy options: will be seen for skilled therapy services  Planned therapy interventions: therapeutic activities, stretching, strengthening, manual therapy, balance/weight-bearing training, functional ROM exercises, home exercise program and gait training  Frequency: 1x week  Duration in visits: 12  Duration in weeks: 12  Treatment plan discussed with: patient        Manual Therapy:    12     mins  19973;  Therapeutic Exercise:    12     mins  94561;     Neuromuscular Lazaro:        mins  71807;    Therapeutic Activity:          mins  19421;     Gait Training:           mins  61235;     Ultrasound:          mins  09606;    Electrical Stimulation:         mins  36753 ( );  Dry Needling         mins self-pay    Timed Treatment:   24   mins   Total Treatment:     55   mins    PT SIGNATURE: Jeffrey Salinas, NEVAEH   DATE TREATMENT INITIATED: 2/7/2022    Initial Certification  Certification Period: 5/8/2022  I certify that the therapy services are furnished while this patient is under my care.   The services outlined above are required by this patient, and will be reviewed every 90 days.     PHYSICIAN: Jeffrey Sutton MD      DATE:     Please sign and return via fax to [phone].. Thank you, [facility].       [facility]

## 2022-02-14 ENCOUNTER — TREATMENT (OUTPATIENT)
Dept: PHYSICAL THERAPY | Facility: CLINIC | Age: 77
End: 2022-02-14

## 2022-02-14 PROCEDURE — 97140 MANUAL THERAPY 1/> REGIONS: CPT | Performed by: PHYSICAL THERAPIST

## 2022-02-14 PROCEDURE — 97110 THERAPEUTIC EXERCISES: CPT | Performed by: PHYSICAL THERAPIST

## 2022-02-14 PROCEDURE — 97530 THERAPEUTIC ACTIVITIES: CPT | Performed by: PHYSICAL THERAPIST

## 2022-02-14 NOTE — PROGRESS NOTES
Physical Therapy Daily Progress Note    Patient: Lilia Murcia   : 1945  Diagnosis/ICD-10 Code:  No primary diagnosis found.  Referring practitioner: Jeffrey Sutton MD  Date of Initial Visit: Type: THERAPY  Noted: 2022  Today's Date: 2022  Patient seen for 2 sessions         Lilia Murcia reports that her knee is feeling good today.  Notes that the exercises for home are going well.  No notable changes in thigh strength since last visit.    Subjective     Objective   See Exercise, Manual, and Modality Logs for complete treatment.       Assessment/Plan  Focused visit on improving sensitivity along the distal lower anterior thigh region.  Combined with improving TKE mobility.  Exercises emphasized anterior thigh mm strength.    *Updated HEP with written and verbal instruction (included in total time billed as TE below).       Manual Therapy:    15     mins  27222;  Therapeutic Exercise:    16     mins  12671;     Neuromuscular Lazaro:        mins  23441;    Therapeutic Activity:     12     mins  11649;     Gait Training:           mins  55194;     Ultrasound:          mins  02376;    Electrical Stimulation:         mins  60892 ( );  Dry Needling          mins self-pay    Timed Treatment:   43   mins   Total Treatment:     43   mins    Jeffrey Salinas PT  Physical Therapist

## 2022-02-18 ENCOUNTER — TRANSCRIBE ORDERS (OUTPATIENT)
Dept: ADMINISTRATIVE | Facility: HOSPITAL | Age: 77
End: 2022-02-18

## 2022-02-18 DIAGNOSIS — Z12.31 VISIT FOR SCREENING MAMMOGRAM: Primary | ICD-10-CM

## 2022-02-21 ENCOUNTER — TREATMENT (OUTPATIENT)
Dept: PHYSICAL THERAPY | Facility: CLINIC | Age: 77
End: 2022-02-21

## 2022-02-21 PROCEDURE — 97110 THERAPEUTIC EXERCISES: CPT | Performed by: PHYSICAL THERAPIST

## 2022-02-21 PROCEDURE — 97140 MANUAL THERAPY 1/> REGIONS: CPT | Performed by: PHYSICAL THERAPIST

## 2022-02-21 PROCEDURE — 97530 THERAPEUTIC ACTIVITIES: CPT | Performed by: PHYSICAL THERAPIST

## 2022-02-21 NOTE — PROGRESS NOTES
Physical Therapy Daily Progress Note    Patient: Lilia Murcia   : 1945  Diagnosis/ICD-10 Code:  No primary diagnosis found.  Referring practitioner: Jeffrey Sutton MD  Date of Initial Visit: Type: THERAPY  Noted: 2022  Today's Date: 2022  Patient seen for 3 sessions         Lilia Murcia reports feeling like her knee is a little bit better.  Is able to lift her leg straight while lying down with a little less difficulty.      Subjective     Objective   See Exercise, Manual, and Modality Logs for complete treatment.       Assessment/Plan  Continued emphasis on improving knee flx mobility.  Combined with improving proximal hip and thigh mm endurance.    *Updated HEP with written and verbal instruction (included in total time of TE billing below).           Manual Therapy:    8     mins  02865;  Therapeutic Exercise:    19     mins  66109;     Neuromuscular Lazaro:        mins  56078;    Therapeutic Activity:     14     mins  09659;     Gait Training:           mins  38626;     Ultrasound:          mins  04273;    Electrical Stimulation:         mins  00892 ( );  Dry Needling          mins self-pay    Timed Treatment:   41   mins   Total Treatment:     44   mins    Jeffrey Salinas, PT  Physical Therapist

## 2022-02-28 ENCOUNTER — TREATMENT (OUTPATIENT)
Dept: PHYSICAL THERAPY | Facility: CLINIC | Age: 77
End: 2022-02-28

## 2022-02-28 DIAGNOSIS — M25.561 CHRONIC PAIN OF RIGHT KNEE: Primary | ICD-10-CM

## 2022-02-28 DIAGNOSIS — G89.29 CHRONIC PAIN OF RIGHT KNEE: Primary | ICD-10-CM

## 2022-02-28 PROCEDURE — 97110 THERAPEUTIC EXERCISES: CPT | Performed by: PHYSICAL THERAPIST

## 2022-02-28 PROCEDURE — 97140 MANUAL THERAPY 1/> REGIONS: CPT | Performed by: PHYSICAL THERAPIST

## 2022-02-28 PROCEDURE — 97530 THERAPEUTIC ACTIVITIES: CPT | Performed by: PHYSICAL THERAPIST

## 2022-02-28 NOTE — PROGRESS NOTES
Physical Therapy Daily Progress Note    Patient: Lilia Murcia   : 1945  Diagnosis/ICD-10 Code:  Chronic pain of right knee [M25.561, G89.29]  Referring practitioner: Jeffrey Sutton MD  Date of Initial Visit: Type: THERAPY  Noted: 2022  Today's Date: 2022  Patient seen for 4 sessions         Lilia Murcia reports that her knee is feeling better.  Has noticed less soreness and more strength.  Knee still dora when it's tired, but not as frequently.        Subjective     Objective   See Exercise, Manual, and Modality Logs for complete treatment.       Assessment/Plan  Continued emphasis on improving TKE and TKF mobility.  Combined with improving proximal hip and thigh strength.  Added tandem stance on unsteady surface to improve R LE mm endurance.    *Updated HEP with written and verbal instruction (included in total time billed as TE below).       Manual Therapy:    10     mins  81368;  Therapeutic Exercise:    16     mins  02070;     Neuromuscular Lazaro:        mins  14622;    Therapeutic Activity:     11     mins  18590;     Gait Training:           mins  61787;     Ultrasound:          mins  66318;    Electrical Stimulation:         mins  39738 ( );  Dry Needling          mins self-pay    Timed Treatment:   38   mins   Total Treatment:     42   mins    Jeffrey Salinas PT  Physical Therapist

## 2022-03-07 ENCOUNTER — TREATMENT (OUTPATIENT)
Dept: PHYSICAL THERAPY | Facility: CLINIC | Age: 77
End: 2022-03-07

## 2022-03-07 DIAGNOSIS — M25.561 CHRONIC PAIN OF RIGHT KNEE: Primary | ICD-10-CM

## 2022-03-07 DIAGNOSIS — G89.29 CHRONIC PAIN OF RIGHT KNEE: Primary | ICD-10-CM

## 2022-03-07 PROCEDURE — 97530 THERAPEUTIC ACTIVITIES: CPT | Performed by: PHYSICAL THERAPIST

## 2022-03-07 PROCEDURE — 97110 THERAPEUTIC EXERCISES: CPT | Performed by: PHYSICAL THERAPIST

## 2022-03-07 PROCEDURE — 97140 MANUAL THERAPY 1/> REGIONS: CPT | Performed by: PHYSICAL THERAPIST

## 2022-03-07 NOTE — PROGRESS NOTES
Physical Therapy Daily Progress Note    Patient: Lilia Murcia   : 1945  Diagnosis/ICD-10 Code:  Chronic pain of right knee [M25.561, G89.29]  Referring practitioner: Jeffrey Sutton MD  Date of Initial Visit: Type: THERAPY  Noted: 2022  Today's Date: 3/7/2022  Patient seen for 5 sessions         Lilia Murcia reports feeling better overall.  Has noticed more strength in her knee.  Does have increased anterior knee soreness since last week.  Is able to walk up/down stairs without hand support with a step to pattern.  Wants to continue working on strength as she is still unable to get down onto her right knee (I.e. kneeling) due to weakness in the leg.        Subjective     Objective   See Exercise, Manual, and Modality Logs for complete treatment.     *LEFS (22):  33/80; LEFS:  49/80  *SLR:  8 deg lag sign  *AROM knee ext/flx:  0-125 deg    Assessment/Plan  Ms. Murcia has attended physical therapy for a total of 5 visits for chronic R knee weakness.  Has improved knee strength indicated by SLR testing.  Has excellent knee mobility.  But, still unable to perform 10 deg knee flx in single leg stance due to lacking anterior thigh strength.  Focused visit on improving posterior thigh and lateral/posterior hip strength to reduce load in the anterior knee.  Will continue to work on anterior knee strength at next visit.    *Updated HEP with written and verbal instruction (included in total time billed as TE below).           Manual Therapy:    11     mins  87843;  Therapeutic Exercise:    21     mins  10675;     Neuromuscular Lazaro:        mins  28917;    Therapeutic Activity:     11     mins  98199;     Gait Training:           mins  83277;     Ultrasound:          mins  89300;    Electrical Stimulation:         mins  01442 ( );  Dry Needling          mins self-pay    Timed Treatment:   44   mins   Total Treatment:     50   mins    Jeffrey Salinas PT  Physical Therapist

## 2022-03-15 ENCOUNTER — TREATMENT (OUTPATIENT)
Dept: PHYSICAL THERAPY | Facility: CLINIC | Age: 77
End: 2022-03-15

## 2022-03-15 DIAGNOSIS — M25.561 CHRONIC PAIN OF RIGHT KNEE: Primary | ICD-10-CM

## 2022-03-15 DIAGNOSIS — G89.29 CHRONIC PAIN OF RIGHT KNEE: Primary | ICD-10-CM

## 2022-03-15 PROCEDURE — 97530 THERAPEUTIC ACTIVITIES: CPT | Performed by: PHYSICAL THERAPIST

## 2022-03-15 PROCEDURE — 97140 MANUAL THERAPY 1/> REGIONS: CPT | Performed by: PHYSICAL THERAPIST

## 2022-03-15 PROCEDURE — 97110 THERAPEUTIC EXERCISES: CPT | Performed by: PHYSICAL THERAPIST

## 2022-03-15 NOTE — PROGRESS NOTES
Physical Therapy Daily Progress Note    Patient: Lilia Murcia   : 1945  Diagnosis/ICD-10 Code:  Chronic pain of right knee [M25.561, G89.29]  Referring practitioner: Jeffrey Sutton MD  Date of Initial Visit: Type: THERAPY  Noted: 2022  Today's Date: 3/15/2022  Patient seen for 6 sessions         Lilia Murcia reports that she feels like she is slowly getting stronger in her knee.  Still has some sensitivity along the medial knee area.  Has working diligently on exercises at home.      Subjective     Objective   See Exercise, Manual, and Modality Logs for complete treatment.       Assessment/Plan  Focused visit on improving anterior knee sensitivity to pressure and flx mobility.  Combined with exercises to improve anterior thigh strength.    *Updated HEP with written and verbal instruction (included in total time billed as TE below).           Manual Therapy:    9     mins  69618;  Therapeutic Exercise:    18     mins  94982;     Neuromuscular Lazaro:        mins  27152;    Therapeutic Activity:     12     mins  05542;     Gait Training:           mins  59531;     Ultrasound:          mins  88631;    Electrical Stimulation:         mins  77919 ( );  Dry Needling          mins self-pay    Timed Treatment:   39   mins   Total Treatment:     44   mins    Jeffrey Salinas PT  Physical Therapist

## 2022-03-22 ENCOUNTER — TREATMENT (OUTPATIENT)
Dept: PHYSICAL THERAPY | Facility: CLINIC | Age: 77
End: 2022-03-22

## 2022-03-22 DIAGNOSIS — G89.29 CHRONIC PAIN OF RIGHT KNEE: Primary | ICD-10-CM

## 2022-03-22 DIAGNOSIS — M25.561 CHRONIC PAIN OF RIGHT KNEE: Primary | ICD-10-CM

## 2022-03-22 PROCEDURE — 97140 MANUAL THERAPY 1/> REGIONS: CPT | Performed by: PHYSICAL THERAPIST

## 2022-03-22 PROCEDURE — 97110 THERAPEUTIC EXERCISES: CPT | Performed by: PHYSICAL THERAPIST

## 2022-03-22 NOTE — PROGRESS NOTES
Physical Therapy Daily Progress Note    Patient: Lilia Murcia   : 1945  Diagnosis/ICD-10 Code:  Chronic pain of right knee [M25.561, G89.29]  Referring practitioner: Jeffrey Sutton MD  Date of Initial Visit: Type: THERAPY  Noted: 2022  Today's Date: 3/22/2022  Patient seen for 7 sessions         Lilia Murcia reports that knee is feeling okay today.  Has had a bad bout of heartburn that she is being treated with high dosage of medication.  Also, has a cardiac stress test next week that will be performed chemically.    Subjective     Objective   See Exercise, Manual, and Modality Logs for complete treatment.       Assessment/Plan  Focused visit on improving knee flx mobility, anterior/distal thigh soreness and posterior thigh strength.  Will f/u with Mrs. Murcia in 2 weeks.           Manual Therapy:    12     mins  51678;  Therapeutic Exercise:    14     mins  10968;     Neuromuscular Lazaro:        mins  58823;    Therapeutic Activity:          mins  79177;     Gait Training:           mins  58811;     Ultrasound:          mins  59536;    Electrical Stimulation:         mins  68020 ( );  Dry Needling          mins self-pay    Timed Treatment:   26  mins   Total Treatment:     30   mins    Jeffrey Salinas, PT  Physical Therapist

## 2022-04-05 ENCOUNTER — TREATMENT (OUTPATIENT)
Dept: PHYSICAL THERAPY | Facility: CLINIC | Age: 77
End: 2022-04-05

## 2022-04-05 DIAGNOSIS — G89.29 CHRONIC PAIN OF RIGHT KNEE: Primary | ICD-10-CM

## 2022-04-05 DIAGNOSIS — M25.561 CHRONIC PAIN OF RIGHT KNEE: Primary | ICD-10-CM

## 2022-04-05 PROCEDURE — 97530 THERAPEUTIC ACTIVITIES: CPT | Performed by: PHYSICAL THERAPIST

## 2022-04-05 PROCEDURE — 97110 THERAPEUTIC EXERCISES: CPT | Performed by: PHYSICAL THERAPIST

## 2022-04-05 PROCEDURE — 97112 NEUROMUSCULAR REEDUCATION: CPT | Performed by: PHYSICAL THERAPIST

## 2022-04-05 NOTE — PROGRESS NOTES
Physical Therapy Daily Progress Note    Patient: Lilia Murcia   : 1945  Diagnosis/ICD-10 Code:  Chronic pain of right knee [M25.561, G89.29]  Referring practitioner: Jeffrey Sutton MD  Date of Initial Visit: Type: THERAPY  Noted: 2022  Today's Date: 2022  Patient seen for 8 sessions         Lilia Murcia reports feeling better overall.  Reports having less difficulty going up stairs, but is still using the one step at a time.  Feels like her balance is improving.  Noted an improvement in her steadiness when mowing her backyard last week.  States that she broke it up into different sessions.      Subjective     Objective   See Exercise, Manual, and Modality Logs for complete treatment.     *LEFS (3/7/22):  49/80 (22):  58/60  *SLR:  5 deg lag  *AROM knee flx:  122 deg    Assessment/Plan  Ms. Murcia has attended physical therapy for a total of 8 visits for R knee weakness.  Has improved knee flx mobility, knee extension strength (I.e. SLR with less lag).  Still has knee ext/locking when the R LE is overloaded (I.e. single leg stance) due to quad weakness.  Continuing to focus care on improving proximal hip strength, thigh strength and general balance.  Will f/u with Ms. Murcia in 2 weeks.    *Updated HEP with written and verbal instructions (included in total time billed as TE below).       Manual Therapy:         mins  39672;  Therapeutic Exercise:    28     mins  37067;     Neuromuscular Lazaro:    8    mins  62069;    Therapeutic Activity:     17     mins  62916;     Gait Training:           mins  03343;     Ultrasound:          mins  16415;    Electrical Stimulation:         mins  73720 ( );  Dry Needling          mins self-pay    Timed Treatment:   53   mins   Total Treatment:     59   mins    Jeffrey Salinas PT  Physical Therapist

## 2022-04-19 ENCOUNTER — TREATMENT (OUTPATIENT)
Dept: PHYSICAL THERAPY | Facility: CLINIC | Age: 77
End: 2022-04-19

## 2022-04-19 DIAGNOSIS — G89.29 CHRONIC PAIN OF RIGHT KNEE: Primary | ICD-10-CM

## 2022-04-19 DIAGNOSIS — M25.561 CHRONIC PAIN OF RIGHT KNEE: Primary | ICD-10-CM

## 2022-04-19 PROCEDURE — 97530 THERAPEUTIC ACTIVITIES: CPT | Performed by: PHYSICAL THERAPIST

## 2022-04-19 PROCEDURE — 97110 THERAPEUTIC EXERCISES: CPT | Performed by: PHYSICAL THERAPIST

## 2022-04-19 PROCEDURE — 97140 MANUAL THERAPY 1/> REGIONS: CPT | Performed by: PHYSICAL THERAPIST

## 2022-04-19 NOTE — PROGRESS NOTES
Physical Therapy Daily Progress Note    Patient: Lilia Murcia   : 1945  Diagnosis/ICD-10 Code:  Chronic pain of right knee [M25.561, G89.29]  Referring practitioner: Jeffrey Sutton MD  Date of Initial Visit: Type: THERAPY  Noted: 2022  Today's Date: 2022  Patient seen for 9 sessions         Lilia Murcia reports that her knee is feeling a little more stable when walking around the yard/uneven surfaces since last visit.      Subjective     Objective   See Exercise, Manual, and Modality Logs for complete treatment.       Assessment/Plan  Focused visit on improving quad sensitivity to moderate mechanical pressure application.  Combined with improving anterior thigh strength.  Will follow-up in 2 weeks and plan to discharge at that time.         Manual Therapy:    9     mins  10704;  Therapeutic Exercise:    18     mins  94744;     Neuromuscular Lazaro:        mins  13961;    Therapeutic Activity:     12     mins  02968;     Gait Training:           mins  01127;     Ultrasound:          mins  86632;    Electrical Stimulation:         mins  57737 ( );  Dry Needling          mins self-pay    Timed Treatment:   39   mins   Total Treatment:     39   mins    Jeffrey Salinas PT  Physical Therapist

## 2022-05-03 ENCOUNTER — TREATMENT (OUTPATIENT)
Dept: PHYSICAL THERAPY | Facility: CLINIC | Age: 77
End: 2022-05-03

## 2022-05-03 DIAGNOSIS — G89.29 CHRONIC PAIN OF RIGHT KNEE: Primary | ICD-10-CM

## 2022-05-03 DIAGNOSIS — M25.561 CHRONIC PAIN OF RIGHT KNEE: Primary | ICD-10-CM

## 2022-05-03 PROCEDURE — 97112 NEUROMUSCULAR REEDUCATION: CPT | Performed by: PHYSICAL THERAPIST

## 2022-05-03 PROCEDURE — 97530 THERAPEUTIC ACTIVITIES: CPT | Performed by: PHYSICAL THERAPIST

## 2022-05-03 PROCEDURE — 97110 THERAPEUTIC EXERCISES: CPT | Performed by: PHYSICAL THERAPIST

## 2022-05-03 NOTE — PROGRESS NOTES
Physical Therapy Daily Progress Note    Patient: Lilia Murcia   : 1945  Diagnosis/ICD-10 Code:  Chronic pain of right knee [M25.561, G89.29]  Referring practitioner: Jeffrey Sutton MD  Date of Initial Visit: Type: THERAPY  Noted: 2022  Today's Date: 5/3/2022  Patient seen for 10 sessions         Lilia Murcia reports that her knee is feeling better overall.  States that she has had less difficulty going up and down hills.  Has been mowing and weed eating with no pain or difficulty.  Is trying to push herself to do daily activities little bit by little bit.      Subjective     Objective   See Exercise, Manual, and Modality Logs for complete treatment.     *LEFS:  55/80  *SLR lag:  10 deg  *Knee ext/flx AROM:  0-128 deg    Assessment/Plan  Ms. Murcia has attended physical therapy for a total of 10 visits for chronic R knee weakness.  Has made slight improvements in quad lag with SLR testing in supine.  Has full active knee flx and ext mobility when compared bilaterally.  Needs focused care to improve TKE strength anti-gravity and in WBing.  Will f/u with Ms. Murcia in 2 weeks.         Manual Therapy:         mins  25948;  Therapeutic Exercise:    15     mins  46982;     Neuromuscular Lazaro:    10    mins  85730;    Therapeutic Activity:     13     mins  25192;     Gait Training:           mins  24720;     Ultrasound:          mins  05102;    Electrical Stimulation:         mins  59050 ( );  Dry Needling          mins self-pay    Timed Treatment:   38   mins   Total Treatment:     38   mins    Jeffrey Salinas, PT  Physical Therapist

## 2022-05-09 ENCOUNTER — HOSPITAL ENCOUNTER (OUTPATIENT)
Dept: MAMMOGRAPHY | Facility: HOSPITAL | Age: 77
Discharge: HOME | End: 2022-05-09
Admitting: FAMILY MEDICINE

## 2022-05-09 DIAGNOSIS — Z12.31 VISIT FOR SCREENING MAMMOGRAM: ICD-10-CM

## 2022-05-09 PROCEDURE — 77067 SCR MAMMO BI INCL CAD: CPT

## 2022-05-09 PROCEDURE — 77063 BREAST TOMOSYNTHESIS BI: CPT

## 2022-05-09 PROCEDURE — 77067 SCR MAMMO BI INCL CAD: CPT | Performed by: RADIOLOGY

## 2022-05-09 PROCEDURE — 77063 BREAST TOMOSYNTHESIS BI: CPT | Performed by: RADIOLOGY

## 2022-05-17 ENCOUNTER — TREATMENT (OUTPATIENT)
Dept: PHYSICAL THERAPY | Facility: CLINIC | Age: 77
End: 2022-05-17

## 2022-05-17 DIAGNOSIS — G89.29 CHRONIC PAIN OF RIGHT KNEE: Primary | ICD-10-CM

## 2022-05-17 DIAGNOSIS — M25.561 CHRONIC PAIN OF RIGHT KNEE: Primary | ICD-10-CM

## 2022-05-17 PROCEDURE — 97140 MANUAL THERAPY 1/> REGIONS: CPT | Performed by: PHYSICAL THERAPIST

## 2022-05-17 PROCEDURE — 97530 THERAPEUTIC ACTIVITIES: CPT | Performed by: PHYSICAL THERAPIST

## 2022-05-17 NOTE — PROGRESS NOTES
Physical Therapy Daily Progress Note    Patient: Lilia Murcia   : 1945  Diagnosis/ICD-10 Code:  Chronic pain of right knee [M25.561, G89.29]  Referring practitioner: Jeffrey Sutton MD  Date of Initial Visit: Type: THERAPY  Noted: 2022  Today's Date: 2022  Patient seen for 11 sessions         Lilia Murcia reports that she has continued difficulty going up/down stairs due to knee weakness.      Subjective     Objective   See Exercise, Manual, and Modality Logs for complete treatment.       Assessment/Plan  Focused visit on reducing anterior knee sensitivity via non-thrust manipulative therapy.  Combined exercises to improve ability to ascend/descend stairs.         Manual Therapy:    9     mins  98911;  Therapeutic Exercise:         mins  02716;     Neuromuscular Lazaro:        mins  30012;    Therapeutic Activity:     20     mins  78312;     Gait Training:           mins  89390;     Ultrasound:          mins  45286;    Electrical Stimulation:         mins  92764 (MC );  Dry Needling          mins self-pay    Timed Treatment:   29   mins   Total Treatment:     29   mins    Jeffrey Salinas PT  Physical Therapist

## 2022-05-26 ENCOUNTER — TELEPHONE (OUTPATIENT)
Dept: ORTHOPEDIC SURGERY | Facility: CLINIC | Age: 77
End: 2022-05-26

## 2022-05-26 NOTE — TELEPHONE ENCOUNTER
Provider: DR ANEDRSON    Caller: EMILIO    Relationship to Patient: SELF    Phone Number: [phone]    Reason for Call: PT IS HAVING PAIN IN UPPER ARM , NEAR SHOULDER. PT WOULD LIKE TO BE EVALUATED FOR ARM PAIN, ADVISED I WOULD HAVE DR ANDERSON RECOMMEND WHO TO SCHEDULE WITH OR IF DR ANDERSON WILL SEE FOR THIS. PT WOULD EVENTUALLY LIKE TO SEE PHYSICAL THERAPY FOR ARM AND WILL NEED REFERRAL. PLEASE ADVISE

## 2022-05-27 NOTE — TELEPHONE ENCOUNTER
Spoke with Pt to inform her that the next available appt is not until 6/20/22 with a PA.....Pt stated that she can't wait that long and would speak with her PCP to see what he recommends

## 2022-06-03 DIAGNOSIS — Z96.651 STATUS POST TOTAL KNEE REPLACEMENT, RIGHT: Primary | ICD-10-CM

## 2022-06-14 ENCOUNTER — TREATMENT (OUTPATIENT)
Dept: PHYSICAL THERAPY | Facility: CLINIC | Age: 77
End: 2022-06-14

## 2022-06-14 DIAGNOSIS — M25.511 ACUTE PAIN OF RIGHT SHOULDER: Primary | ICD-10-CM

## 2022-06-14 PROCEDURE — 97530 THERAPEUTIC ACTIVITIES: CPT | Performed by: PHYSICAL THERAPIST

## 2022-06-14 PROCEDURE — 97140 MANUAL THERAPY 1/> REGIONS: CPT | Performed by: PHYSICAL THERAPIST

## 2022-06-14 PROCEDURE — 97110 THERAPEUTIC EXERCISES: CPT | Performed by: PHYSICAL THERAPIST

## 2022-06-14 NOTE — PROGRESS NOTES
Re-Assessment / Re-Certification        Patient: Lilia Murcia   : 1945  Diagnosis/ICD-10 Code:  No primary diagnosis found.  Referring practitioner: Jeffrey Sutton MD  Date of Initial Visit: 2022  Today's Date: 2022  Patient seen for Visit count could not be calculated. Make sure you are using a visit which is associated with an episode. sessions      Subjective:     Subjective Questionnaire: QuickDASH: 40.91  Clinical Progress: Pt is now being seen for the knee and shoulder. Her knee feels better but not 100%. Her shoulder is really bothering her today.  Home Program Compliance: Yes  Treatment has included: therapeutic exercise, neuromuscular re-education, manual therapy, therapeutic activity and gait training    Subjective Evaluation    History of Present Illness  Mechanism of injury: Pt is a 76 year old female who is currently being seen for R knee pain, and now she is having right shoulder pain. This began around 2 months ago. The pain began around the lateral deltoid, but now it is up in the shoulder joint as well. She just finished a round of prednisone which did really help. She has had to use her right arm quite a bit to compensate for the knee pain. She has to pull up into her truck and to get up and down steps. She denies numbness and tingling. Using the weed eater, reaching out to the side, reaching behind the back, and using the hair dryer are all bothersome. She also cannot sleep on that side.     Quality of life: excellent    Pain  Current pain ratin  At worst pain rating: 10  Quality: sharp and dull ache  Relieving factors: medications  Aggravating factors: outstretched reach, repetitive movement, lifting and sleeping    Hand dominance: right    Diagnostic Tests  No diagnostic tests performed    Patient Goals  Patient goals for therapy: decreased pain, increased motion, increased strength, independence with ADLs/IADLs and return to sport/leisure activities         Objective           Tenderness     Right Shoulder  Tenderness in the infraspinatus tendon and supraspinatus tendon.     Active Range of Motion   Left Shoulder   Flexion: 160 degrees   Abduction: 165 degrees     Right Shoulder   Flexion: 157 degrees   Abduction: 150 degrees with pain    Additional Active Range of Motion Details  Apleys ER right: T3  Apleys ER left: T3  Apleys IR right: T9 (pain)  Apleys IR left: T7    Strength/Myotome Testing     Left Shoulder     Planes of Motion   Flexion: 4+   Abduction: 4+   External rotation at 0°: 5   Internal rotation at 0°: 5     Isolated Muscles   Biceps: 5     Right Shoulder     Planes of Motion   Flexion: 3+   Abduction: 3+   External rotation at 0°: 4+   Internal rotation at 0°: 5     Isolated Muscles   Biceps: 5     Tests     Right Shoulder   Positive empty can.       Assessment & Plan     Assessment    Assessment details: Pt is a 76 year old female who has been coming to PT for right knee pain, who is now having pain in the right shoulder as well. Signs and symptoms are consistent with a rotator cuff tendinopathy. She demonstrates decreased AROM, decreased strength, positive empty can, and tenderness of the supra and infraspinatus tendons. Her impairments are limiting her ability to reach behind her back and dry her hair. Pt would benefit from skilled PT to address her impairments so she can reach her long term goals.    Plan  Duration in visits: 1  Duration in weeks: 8          New Goals  SHORT TERM GOALS:     2 weeks  1. Pt I w/ HEP  2. Pt to demonstrate PROM of the right shoulder to WFL to improve ability to perform ADL's  3. Pt to demonstrate ability to perform 30 minutes continuous activity in the clinic without increase in pain     LONG TERM GOALS:   6 weeks  1. Pt to demonstrate AROM of the right shoulder to WNL to allow ability to perform all necessary functional activities  2. Pt to demonstrate ability to lift 5# OH with the right arm without increase in pain  3. Pt to  report being able to work full duty without increase in pain in the shoulder  4. Pt to tolerate 60 minutes continuous activity in the clinic without increase in pain       Recommendations: Continue as planned  Timeframe: 2 months  Prognosis to achieve goals: good    PT Signature: Anusha Mixon, PT      Based upon review of the patient's progress and continued therapy plan, it is my medical opinion that Lilia Murcia should continue physical therapy treatment at [facility]  [address] [place] [id number].    Signature: __________________________________  Jeffrey Sutton MD    Manual Therapy:    15     mins  71043;  Therapeutic Exercise:    12     mins  75957;     Neuromuscular Lazaro:        mins  88787;    Therapeutic Activity:     11     mins  36811;     Gait Training:           mins  51037;     Ultrasound:          mins  77140;    Electrical Stimulation:         mins  81271 ( );  E-Stim Attended:         mins  62751  Iontophoresis          mins 51704   Traction          mins  93554  Fluidotherapy          mins  00566  Dry Needling          mins self-pay  Paraffin          mins  30026    Timed Treatment:   38   mins   Total Treatment:     46   mins

## 2022-06-17 ENCOUNTER — TRANSCRIBE ORDERS (OUTPATIENT)
Dept: PHYSICAL THERAPY | Facility: CLINIC | Age: 77
End: 2022-06-17

## 2022-06-17 DIAGNOSIS — M75.21 BICEPS TENDINITIS OF RIGHT SHOULDER: Primary | ICD-10-CM

## 2022-06-17 DIAGNOSIS — M79.603 PAIN OF UPPER EXTREMITY, UNSPECIFIED LATERALITY: ICD-10-CM

## 2022-06-20 ENCOUNTER — TELEPHONE (OUTPATIENT)
Dept: PHYSICAL THERAPY | Facility: CLINIC | Age: 77
End: 2022-06-20

## 2022-06-21 ENCOUNTER — TREATMENT (OUTPATIENT)
Dept: PHYSICAL THERAPY | Facility: CLINIC | Age: 77
End: 2022-06-21

## 2022-06-21 DIAGNOSIS — M25.511 ACUTE PAIN OF RIGHT SHOULDER: Primary | ICD-10-CM

## 2022-06-21 PROCEDURE — 97110 THERAPEUTIC EXERCISES: CPT | Performed by: PHYSICAL THERAPIST

## 2022-06-21 PROCEDURE — 97140 MANUAL THERAPY 1/> REGIONS: CPT | Performed by: PHYSICAL THERAPIST

## 2022-06-21 NOTE — PROGRESS NOTES
Physical Therapy Daily Progress Note    Subjective   Lilia Murcia reports: she has good days and bad with the shoulder.       Objective   See Exercise, Manual, and Modality Logs for complete treatment.       Assessment/Plan   Pt tolerated treatment well. She required cues to not strain her neck while performing scapular exercises. She was given an updated HEP. Pt would benefit from continued skilled PT.    Progress per Plan of Care           Manual Therapy:    15     mins  69112;  Therapeutic Exercise:    10     mins  86070;     Neuromuscular Lazaro:        mins  23403;    Therapeutic Activity:          mins  96877;     Gait Training:           mins  30098;     Ultrasound:          mins  38403;    Electrical Stimulation:         mins  01073 ( );  E-Stim Attended:         mins  16467  Iontophoresis          mins 22004   Traction          mins  14946  Fluidotherapy          mins  15981  Dry Needling          mins self-pay - No Charge  Paraffin          mins  78171    Timed Treatment:   25   mins   Total Treatment:     48   mins    nAusha Mixon, PT, DPT  Physical Therapist

## 2022-06-28 ENCOUNTER — TREATMENT (OUTPATIENT)
Dept: PHYSICAL THERAPY | Facility: CLINIC | Age: 77
End: 2022-06-28

## 2022-06-28 DIAGNOSIS — M25.511 ACUTE PAIN OF RIGHT SHOULDER: Primary | ICD-10-CM

## 2022-06-28 PROCEDURE — 97110 THERAPEUTIC EXERCISES: CPT | Performed by: PHYSICAL THERAPIST

## 2022-06-28 PROCEDURE — 97140 MANUAL THERAPY 1/> REGIONS: CPT | Performed by: PHYSICAL THERAPIST

## 2022-06-28 NOTE — PROGRESS NOTES
Physical Therapy Daily Progress Note    Subjective   Lilia Murcia reports: she was doing really well, but for some reason in the last 2 days her shoulder has been more sore.       Objective   See Exercise, Manual, and Modality Logs for complete treatment.       Assessment/Plan   Pt tolerated treatment well. She had increased pain in the lateral brachial region with rotator cuff activation activities. Pt would benefit from continued skilled PT.    Progress per Plan of Care           Manual Therapy:    10     mins  93545;  Therapeutic Exercise:    15     mins  59037;     Neuromuscular Lazaro:        mins  94019;    Therapeutic Activity:          mins  80046;     Gait Training:           mins  64333;     Ultrasound:          mins  55413;    Electrical Stimulation:         mins  29028 ( );  E-Stim Attended:         mins  45792  Iontophoresis          mins 90346   Traction          mins  63085  Fluidotherapy          mins  27468  Dry Needling          mins self-pay - No Charge  Paraffin          mins  12062    Timed Treatment:   25   mins   Total Treatment:     45   mins    Anusha Mixon, PT, DPT  Physical Therapist

## 2022-07-19 ENCOUNTER — TREATMENT (OUTPATIENT)
Dept: PHYSICAL THERAPY | Facility: CLINIC | Age: 77
End: 2022-07-19

## 2022-07-19 DIAGNOSIS — M25.511 ACUTE PAIN OF RIGHT SHOULDER: Primary | ICD-10-CM

## 2022-07-19 PROCEDURE — 97110 THERAPEUTIC EXERCISES: CPT | Performed by: PHYSICAL THERAPIST

## 2022-07-19 PROCEDURE — 97140 MANUAL THERAPY 1/> REGIONS: CPT | Performed by: PHYSICAL THERAPIST

## 2022-07-19 PROCEDURE — 97112 NEUROMUSCULAR REEDUCATION: CPT | Performed by: PHYSICAL THERAPIST

## 2022-07-19 NOTE — PROGRESS NOTES
Physical Therapy Daily Progress Note    Subjective   Lilia Murcia reports: 75% improvement since starting PT. She has not done her exercises over the last week.       Objective          Active Range of Motion     Right Shoulder   Flexion: 160 degrees   Abduction: 165 degrees     Additional Active Range of Motion Details  Apleys IR R: T8      See Exercise, Manual, and Modality Logs for complete treatment.       Assessment/Plan   Pt tolerated treatment well. Her AROM has improved. She is making steady progress towards her goals. She was educated on an updated HEP. Pt would benefit from continued skilled PT.    Progress per Plan of Care           Manual Therapy:    10     mins  35514;  Therapeutic Exercise:    24     mins  71858;     Neuromuscular Lazaro:    10    mins  38587;    Therapeutic Activity:          mins  84550;     Gait Training:           mins  23789;     Ultrasound:          mins  75727;    Electrical Stimulation:         mins  63182 ( );  E-Stim Attended:         mins  68563  Iontophoresis          mins 72372   Traction          mins  73998  Fluidotherapy          mins  44812  Dry Needling          mins self-pay - No Charge  Paraffin          mins  59675    Timed Treatment:   44   mins   Total Treatment:     44   mins    Anusha Mixon, PT, DPT  Physical Therapist

## 2022-08-04 ENCOUNTER — TREATMENT (OUTPATIENT)
Dept: PHYSICAL THERAPY | Facility: CLINIC | Age: 77
End: 2022-08-04

## 2022-08-04 DIAGNOSIS — M25.511 ACUTE PAIN OF RIGHT SHOULDER: Primary | ICD-10-CM

## 2022-08-04 PROCEDURE — 97112 NEUROMUSCULAR REEDUCATION: CPT | Performed by: PHYSICAL THERAPIST

## 2022-08-04 PROCEDURE — 97110 THERAPEUTIC EXERCISES: CPT | Performed by: PHYSICAL THERAPIST

## 2022-08-04 NOTE — PROGRESS NOTES
Physical Therapy Daily Progress Note    Subjective   Lilia Murcia reports: she feels about 80% better overall. She has started PT for her knee at another location that offers blood flow restriction therapy.       Objective          Strength/Myotome Testing     Left Shoulder     Planes of Motion   Flexion: 4+   Abduction: 4+   External rotation at 0°: 4+   Internal rotation at 0°: 5     Isolated Muscles   Biceps: 5       See Exercise, Manual, and Modality Logs for complete treatment.       Assessment/Plan  Pt tolerated treatment well. Her strength has improved significantly since starting PT. She feels 80% better overall. Since she has started therapy at a different location, she will be discharged from this location. She was instructed to continue her HEP at least 3x per week to continue making improvements. At this time, she is safe for discharge.    Other  DC         Manual Therapy:         mins  60506;  Therapeutic Exercise:    23     mins  88553;     Neuromuscular Lazaro:    16    mins  19569;    Therapeutic Activity:          mins  14922;     Gait Training:           mins  69576;     Ultrasound:          mins  22043;    Electrical Stimulation:         mins  46756 ( );  E-Stim Attended:         mins  53189  Iontophoresis          mins 18634   Traction         mins  13955  Fluidotherapy          mins  47041  Dry Needling          mins self-pay - No Charge  Paraffin          mins  73307    Timed Treatment:   39   mins   Total Treatment:     39   mins    Anusha Mixon, PT, DPT  Physical Therapist

## 2022-09-14 ENCOUNTER — OFFICE VISIT (OUTPATIENT)
Dept: ORTHOPEDIC SURGERY | Facility: CLINIC | Age: 77
End: 2022-09-14

## 2022-09-14 VITALS
HEIGHT: 61 IN | SYSTOLIC BLOOD PRESSURE: 120 MMHG | DIASTOLIC BLOOD PRESSURE: 62 MMHG | WEIGHT: 138 LBS | BODY MASS INDEX: 26.06 KG/M2

## 2022-09-14 DIAGNOSIS — Z96.651 S/P TKR (TOTAL KNEE REPLACEMENT), RIGHT: Primary | ICD-10-CM

## 2022-09-14 PROCEDURE — 99212 OFFICE O/P EST SF 10 MIN: CPT | Performed by: ORTHOPAEDIC SURGERY

## 2022-09-14 RX ORDER — MULTIVIT WITH MINERALS/LUTEIN
250 TABLET ORAL DAILY
COMMUNITY

## 2022-09-14 RX ORDER — FERROUS SULFATE 325(65) MG
325 TABLET ORAL
COMMUNITY

## 2022-09-14 RX ORDER — LANOLIN ALCOHOL/MO/W.PET/CERES
5000 CREAM (GRAM) TOPICAL DAILY
COMMUNITY

## 2022-09-14 RX ORDER — MELATONIN
2000 DAILY
COMMUNITY

## 2022-09-14 NOTE — PROGRESS NOTES
[facility] Clinic Note    Subjective     No chief complaint on file.       HPI    It has been 7  month(s) since Ms. Murcia's last visit. She returns to clinic today for postoperative follow-up of right knee total knee replacement. The issue has been ongoing for 2 year(s). She rates her pain a 3/10 on the pain scale. Previous/current treatments: physical therapy. Current symptoms: pain and giving way/buckling. The pain is worse with climbing stairs; n/a improve the pain. Overall, she is doing better.  No new complaints today.    I have reviewed the following portions of the patient's history and agree with: History of Present Illness and Review of Systems    Patient Active Problem List   Diagnosis   • Primary osteoarthritis of left knee   • Primary osteoarthritis of right knee   • Status post total left knee replacement   • HTN (hypertension)   • Prediabetes   • Leukocytosis, likely reactive   • Acute blood loss anemia, mild, likely reactive   • Acute postoperative pain   • Degenerative arthritis of right knee   • S/P total knee arthroplasty, right   • Acute postoperative pain of right knee   • Quadriceps tendon rupture status post repair   • Rupture of right quadriceps tendon     Past Medical History:   Diagnosis Date   • Arthritis    • Asthma    • GERD (gastroesophageal reflux disease)    • Hiatal hernia     still present    • Hyperlipidemia    • Hypertension    • Skin cancer     basal cell still present on top of head   • Wears glasses       Past Surgical History:   Procedure Laterality Date   • BREAST EXCISIONAL BIOPSY Right 11/12/2013   • CATARACT EXTRACTION      bilat    • CHOLECYSTECTOMY     • COLONOSCOPY     • ENDOSCOPY     • HYSTERECTOMY      total    • INJECTION OF MEDICATION      x5 bilat knee cortisone    • OOPHORECTOMY     • QUADRICEPS TENDON REPAIR Right 1/7/2021    Procedure: QUADRICEPS TENDON REPAIR RIGHT;  Surgeon: Jeffrey Sutton MD;  [facility]: [facility];  Service: Orthopedics;   Laterality: Right;   • SKIN BIOPSY     • TOTAL KNEE ARTHROPLASTY Left 9/10/2019    Procedure: TOTAL KNEE LEFT ARTHROPLASTY;  Surgeon: Jeffrey Sutton MD;  Location:  [facility];  Service: Orthopedics   • TOTAL KNEE ARTHROPLASTY Right 1/5/2021    Procedure: TOTAL KNEE ARTHROPLASTY RIGHT;  Surgeon: Jeffrey Sutton MD;  Location:  [facility];  Service: Orthopedics;  Laterality: Right;      Family History   Problem Relation Age of Onset   • Breast cancer Neg Hx    • Ovarian cancer Neg Hx      Social History     Socioeconomic History   • Marital status:    Tobacco Use   • Smoking status: Never Smoker   • Smokeless tobacco: Never Used   Vaping Use   • Vaping Use: Never used   Substance and Sexual Activity   • Alcohol use: No   • Drug use: No   • Sexual activity: Defer      Current Outpatient Medications on File Prior to Visit   Medication Sig Dispense Refill   • albuterol sulfate  (90 Base) MCG/ACT inhaler Inhale 2 puffs Every 6 (Six) Hours As Needed for Wheezing.     • aspirin 81 MG chewable tablet Chew 1 tablet Daily. Resume in 1 month     • Cholecalciferol (VITAMIN D PO) Take 2,000 mg by mouth Daily.     • cholecalciferol (VITAMIN D3) 25 MCG (1000 UT) tablet Take 2,000 Units by mouth Daily.     • ferrous sulfate 325 (65 FE) MG tablet Take 325 mg by mouth Daily With Breakfast.     • hydrochlorothiazide (HYDRODIURIL) 25 MG tablet Take 25 mg by mouth Daily.     • metoprolol tartrate (LOPRESSOR) 25 MG tablet Take 25 mg by mouth 2 (Two) Times a Day.     • omeprazole (priLOSEC) 20 MG capsule Take 20 mg by mouth Daily As Needed.     • quinapril (ACCUPRIL) 40 MG tablet Take 40 mg by mouth Daily.     • vitamin B-12 (CYANOCOBALAMIN) 1000 MCG tablet Take 5,000 mcg by mouth Daily.     • vitamin C (ASCORBIC ACID) 250 MG tablet Take 250 mg by mouth Daily.       No current facility-administered medications on file prior to visit.      Allergies   Allergen Reactions   • Sulfa Antibiotics Rash and Unknown (See Comments)      "May or may not be a reaction. Has been years since a reaction.         Review of Systems   Musculoskeletal: Positive for arthralgias.   All other systems reviewed and are negative.       Objective      Physical Exam  /62   Ht 154.9 cm (60.98\")   Wt 62.6 kg (138 lb)   BMI 26.09 kg/m²     Body mass index is 26.09 kg/m².    General:   Mental Status:  Alert   Appearance: Cooperative, in no acute distress   Build and Nutrition: Well-nourished well-developed female   Orientation: Alert and oriented to person, place and time   Posture: Normal   Gait: Nonantalgic    Integument:              Right knee: Wound is well-healed with no signs of infection     Lower Extremities:              Right Knee:                          Tenderness:    None                          Effusion:          None                          Swelling:          None                          Crepitus:          None                          Range of motion:        Extension:       10° actively, 0° passively                                                              Flexion:           130°  Instability:        No varus laxity, no valgus laxity, negative anterior drawer  Deformities:     None    Imaging/Studies  Imaging Results (Last 24 Hours)     Procedure Component Value Units Date/Time    XR Knee 3+ View With Ali Chuk Right [[id number]] Resulted: 09/14/22 0952     Updated: 09/14/22 0953    Narrative:      Right Knee Radiographs  Indication: status-post right total knee arthroplasty  Views: AP, lateral, and sunrise views of the right knee    Comparison: no change compared to prior study, 1/24/2022    Findings:   The components are well aligned, with no signs of loosening or failure.            Assessment and Plan     Diagnoses and all orders for this visit:    1. S/P TKR (total knee replacement), right (Primary)  -     XR Knee 3+ View With Ali Chuk Right        1. S/P TKR (total knee replacement), right        I reviewed my findings with the " patient.  Overall her right total knee arthroplasty appears to be functioning well, in spite of her quadriceps tendon injury following surgery.  I will see her back in 3-1/2 years, which will be a 5-year follow-up with x-rays.  I will see her back sooner for any problems.    Return in about 3 years (around 9/14/2025) for Recheck with X-Rays.      Jeffrey Sutton MD  09/14/22  10:24 EDT

## 2023-03-02 ENCOUNTER — TRANSCRIBE ORDERS (OUTPATIENT)
Dept: ADMINISTRATIVE | Facility: HOSPITAL | Age: 78
End: 2023-03-02
Payer: MEDICARE

## 2023-03-02 DIAGNOSIS — Z12.31 VISIT FOR SCREENING MAMMOGRAM: Primary | ICD-10-CM

## 2023-03-15 ENCOUNTER — TRANSCRIBE ORDERS (OUTPATIENT)
Dept: ADMINISTRATIVE | Facility: HOSPITAL | Age: 78
End: 2023-03-15
Payer: MEDICARE

## 2023-03-15 DIAGNOSIS — M85.88 OSTEOPENIA OF LUMBAR SPINE: Primary | ICD-10-CM

## 2023-05-10 ENCOUNTER — HOSPITAL ENCOUNTER (OUTPATIENT)
Dept: MAMMOGRAPHY | Facility: HOSPITAL | Age: 78
Discharge: HOME | End: 2023-05-10
Admitting: FAMILY MEDICINE
Payer: MEDICARE

## 2023-05-10 DIAGNOSIS — Z12.31 VISIT FOR SCREENING MAMMOGRAM: ICD-10-CM

## 2023-05-10 PROCEDURE — 77063 BREAST TOMOSYNTHESIS BI: CPT

## 2023-05-10 PROCEDURE — 77067 SCR MAMMO BI INCL CAD: CPT

## 2023-05-17 ENCOUNTER — HOSPITAL ENCOUNTER (OUTPATIENT)
Dept: BONE DENSITY | Facility: HOSPITAL | Age: 78
Discharge: HOME | End: 2023-05-17
Admitting: FAMILY MEDICINE
Payer: MEDICARE

## 2023-05-17 DIAGNOSIS — M85.88 OSTEOPENIA OF LUMBAR SPINE: ICD-10-CM

## 2023-05-17 PROCEDURE — 77080 DXA BONE DENSITY AXIAL: CPT

## 2024-01-30 NOTE — TELEPHONE ENCOUNTER
No fever, no drainage   Started this week, benedryl helps with itch but not red bumps    After showing Johana picture of knee she wants the patient to put hydrocortisone on area, not on incision, continue as needed.  If anything should change over the weekend for her to proceed to the  ER.  If redness doesn't improve over the weekend, Anish will give us a call on Monday and we will bring her in to see Johana that day.      Patient understood and had no further questions.  Faina   DISPLAY PLAN FREE TEXT

## 2024-02-26 ENCOUNTER — TRANSCRIBE ORDERS (OUTPATIENT)
Dept: ADMINISTRATIVE | Facility: HOSPITAL | Age: 79
End: 2024-02-26
Payer: MEDICARE

## 2024-02-26 DIAGNOSIS — Z12.31 SCREENING MAMMOGRAM FOR BREAST CANCER: Primary | ICD-10-CM

## 2024-05-13 ENCOUNTER — HOSPITAL ENCOUNTER (OUTPATIENT)
Dept: MAMMOGRAPHY | Facility: HOSPITAL | Age: 79
Discharge: HOME | End: 2024-05-13
Admitting: FAMILY MEDICINE
Payer: MEDICARE

## 2024-05-13 DIAGNOSIS — Z12.31 SCREENING MAMMOGRAM FOR BREAST CANCER: ICD-10-CM

## 2024-05-13 PROCEDURE — 77067 SCR MAMMO BI INCL CAD: CPT

## 2024-05-13 PROCEDURE — 77063 BREAST TOMOSYNTHESIS BI: CPT

## 2024-05-15 PROCEDURE — 77067 SCR MAMMO BI INCL CAD: CPT | Performed by: RADIOLOGY

## 2024-05-15 PROCEDURE — 77063 BREAST TOMOSYNTHESIS BI: CPT | Performed by: RADIOLOGY

## 2025-03-31 ENCOUNTER — TRANSCRIBE ORDERS (OUTPATIENT)
Dept: ADMINISTRATIVE | Facility: HOSPITAL | Age: 80
End: 2025-03-31
Payer: MEDICARE

## 2025-03-31 DIAGNOSIS — Z12.31 ENCOUNTER FOR SCREENING MAMMOGRAM FOR MALIGNANT NEOPLASM OF BREAST: Primary | ICD-10-CM

## 2025-05-14 ENCOUNTER — HOSPITAL ENCOUNTER (OUTPATIENT)
Dept: MAMMOGRAPHY | Facility: HOSPITAL | Age: 80
Discharge: HOME | End: 2025-05-14
Admitting: FAMILY MEDICINE
Payer: MEDICARE

## 2025-05-14 DIAGNOSIS — Z12.31 ENCOUNTER FOR SCREENING MAMMOGRAM FOR MALIGNANT NEOPLASM OF BREAST: ICD-10-CM

## 2025-05-14 PROCEDURE — 77063 BREAST TOMOSYNTHESIS BI: CPT

## 2025-05-14 PROCEDURE — 77067 SCR MAMMO BI INCL CAD: CPT

## (undated) DEVICE — CEMENT MIXING SYSTEM WITH MIS FEMORAL BREAKAWAY NOZZLE: Brand: REVOLUTION

## (undated) DEVICE — STRYKER PERFORMANCE SERIES SAGITTAL BLADE: Brand: STRYKER PERFORMANCE SERIES

## (undated) DEVICE — BNDG ELAS CO-FLEX SLF ADHR 4IN5YD LF STRL

## (undated) DEVICE — BNDG ELAS ELITE V/CLOSE 6IN 5YD LF STRL

## (undated) DEVICE — ANTIBACTERIAL UNDYED BRAIDED (POLYGLACTIN 910), SYNTHETIC ABSORBABLE SUTURE: Brand: COATED VICRYL

## (undated) DEVICE — DRSNG PAD ABD 8X10IN STRL

## (undated) DEVICE — SYS CLS SKIN PREMIERPRO EXOFINFUSION 22CM

## (undated) DEVICE — CVR HNDL LT SURG ACCSSRY BLU STRL

## (undated) DEVICE — PK KN TOTL 10

## (undated) DEVICE — CVR HNDL LIGHT RIGID

## (undated) DEVICE — GOWN,REINF,POLY,ECL,PP SLV,3XL,XLONG: Brand: MEDLINE

## (undated) DEVICE — UNDERCAST PADDING: Brand: DEROYAL

## (undated) DEVICE — PUMP PAIN AUTOFUSER AUTO SELCT NOBOLUS 1TO14ML/HR 550ML DISP

## (undated) DEVICE — Device

## (undated) DEVICE — SYS SKIN CLS DERMABOND PRINEO W/22CM MESH TP

## (undated) DEVICE — SPNG GZ WOVN 4X4IN 12PLY 10/BX STRL

## (undated) DEVICE — GLV SURG SENSICARE W/ALOE PF LF 9 STRL

## (undated) DEVICE — 3M™ STERI-DRAPE™ INSTRUMENT POUCH 1018L: Brand: STERI-DRAPE™

## (undated) DEVICE — BNDG ELAS W/CLIP 6IN 10YD LF STRL

## (undated) DEVICE — HEWSON SUTURE RETRIEVER: Brand: HEWSON SUTURE RETRIEVER

## (undated) DEVICE — GLV SURG PREMIERPRO MIC LTX PF SZ8.5 BRN

## (undated) DEVICE — SYR LUERLOK 20CC BX/50

## (undated) DEVICE — NDL HYPO ECLPS SFTY 18G 1 1/2IN

## (undated) DEVICE — PK EXTREM LOWR 10